# Patient Record
Sex: FEMALE | Race: ASIAN | NOT HISPANIC OR LATINO | Employment: OTHER | URBAN - METROPOLITAN AREA
[De-identification: names, ages, dates, MRNs, and addresses within clinical notes are randomized per-mention and may not be internally consistent; named-entity substitution may affect disease eponyms.]

---

## 2017-02-06 ENCOUNTER — COMMUNICATION - HEALTHEAST (OUTPATIENT)
Dept: PULMONOLOGY | Facility: OTHER | Age: 72
End: 2017-02-06

## 2017-02-15 ENCOUNTER — OFFICE VISIT - HEALTHEAST (OUTPATIENT)
Dept: PULMONOLOGY | Facility: OTHER | Age: 72
End: 2017-02-15

## 2017-02-15 ENCOUNTER — TRANSFERRED RECORDS (OUTPATIENT)
Dept: HEALTH INFORMATION MANAGEMENT | Facility: CLINIC | Age: 72
End: 2017-02-15

## 2017-02-15 DIAGNOSIS — J47.9 BRONCHIECTASIS (H): ICD-10-CM

## 2017-02-15 LAB
IGA SERPL-MCNC: 1990 MG/DL (ref 700–1700)
IGA SERPL-MCNC: 367 MG/DL (ref 65–400)
IGM SERPL-MCNC: 113 MG/DL (ref 60–280)

## 2017-02-16 LAB
ALPHA-1-ANTITRYPSIN, S: 146 MG/DL (ref 100–190)
Lab: 24.3 MG/L
Lab: 8.8 MG/L
SCL-70 AUTOANTIBODIES - HISTORICAL: 2 EU
THERMOACTINOMYCES VULGARIS, IGG AB - HISTORICAL: 16.2 MG/L

## 2017-02-21 LAB
SS-A/RO AUTOANTIBODIES - HISTORICAL: 5 EU
SS-B/LA AUTOANTIBODIES - HISTORICAL: 2 EU

## 2017-03-01 ENCOUNTER — HOSPITAL ENCOUNTER (OUTPATIENT)
Dept: CT IMAGING | Facility: HOSPITAL | Age: 72
Discharge: HOME | End: 2017-03-01
Attending: INTERNAL MEDICINE

## 2017-03-01 ENCOUNTER — HOSPITAL ENCOUNTER (OUTPATIENT)
Dept: RADIOLOGY | Facility: HOSPITAL | Age: 72
Discharge: HOME | End: 2017-03-01
Attending: INTERNAL MEDICINE

## 2017-03-01 DIAGNOSIS — J47.9 BRONCHIECTASIS (H): ICD-10-CM

## 2017-03-13 ENCOUNTER — OFFICE VISIT - HEALTHEAST (OUTPATIENT)
Dept: PULMONOLOGY | Facility: OTHER | Age: 72
End: 2017-03-13

## 2017-03-13 ENCOUNTER — TRANSFERRED RECORDS (OUTPATIENT)
Dept: HEALTH INFORMATION MANAGEMENT | Facility: CLINIC | Age: 72
End: 2017-03-13

## 2017-03-13 DIAGNOSIS — D82.4 HYPERIMMUNOGLOBULIN E (IGE) SYNDROME (H): ICD-10-CM

## 2017-03-16 ENCOUNTER — AMBULATORY - HEALTHEAST (OUTPATIENT)
Dept: LAB | Facility: CLINIC | Age: 72
End: 2017-03-16

## 2017-03-16 DIAGNOSIS — J47.9 BRONCHIECTASIS (H): ICD-10-CM

## 2017-05-17 ENCOUNTER — OFFICE VISIT (OUTPATIENT)
Dept: FAMILY MEDICINE | Facility: CLINIC | Age: 72
End: 2017-05-17

## 2017-05-17 VITALS
SYSTOLIC BLOOD PRESSURE: 127 MMHG | HEART RATE: 98 BPM | TEMPERATURE: 98.1 F | WEIGHT: 101.4 LBS | DIASTOLIC BLOOD PRESSURE: 82 MMHG | OXYGEN SATURATION: 94 % | BODY MASS INDEX: 18.55 KG/M2

## 2017-05-17 DIAGNOSIS — Z23 NEED FOR VACCINATION: ICD-10-CM

## 2017-05-17 DIAGNOSIS — J45.40 MODERATE PERSISTENT ASTHMA WITHOUT COMPLICATION: ICD-10-CM

## 2017-05-17 DIAGNOSIS — J30.2 SEASONAL ALLERGIC RHINITIS, UNSPECIFIED ALLERGIC RHINITIS TRIGGER: ICD-10-CM

## 2017-05-17 DIAGNOSIS — S91.332A PUNCTURE WOUND OF LEFT FOOT, INITIAL ENCOUNTER: Primary | ICD-10-CM

## 2017-05-17 RX ORDER — CETIRIZINE HYDROCHLORIDE 10 MG/1
10 TABLET ORAL EVERY EVENING
Qty: 90 TABLET | Refills: 1 | Status: SHIPPED | OUTPATIENT
Start: 2017-05-17 | End: 2018-08-30

## 2017-05-17 RX ORDER — ACETAMINOPHEN 325 MG/1
650 TABLET ORAL EVERY 4 HOURS PRN
Qty: 100 TABLET | Refills: 0 | Status: SHIPPED | OUTPATIENT
Start: 2017-05-17 | End: 2017-09-18

## 2017-05-17 RX ORDER — FLUTICASONE PROPIONATE 220 UG/1
2 AEROSOL, METERED RESPIRATORY (INHALATION) 2 TIMES DAILY
Qty: 3 INHALER | Refills: 1 | Status: SHIPPED | OUTPATIENT
Start: 2017-05-17 | End: 2017-09-18

## 2017-05-17 NOTE — MR AVS SNAPSHOT
After Visit Summary   2017    Lara Frost    MRN: [id number]           Patient Information     Date Of Birth          1945        Visit Information        Provider Department      2017 1:50 PM Rachna Candelario MD [facility]        Today's Diagnoses     Puncture wound of left foot, initial encounter    -  1    Seasonal allergic rhinitis, unspecified allergic rhinitis trigger        Moderate persistent asthma without complication          Care Instructions    Tetanus shot today  Tylenol and ice for foot  Take zyrtec at night for allergies  Take new inhaler (fluticasone) 2 puffs in the morning and 2 puffs at the evening.     Come back in 1 month to check cough.            Follow-ups after your visit        Who to contact     Please call your clinic at [phone] to:    Ask questions about your health    Make or cancel appointments    Discuss your medicines    Learn about your test results    Speak to your doctor   If you have compliments or concerns about an experience at your clinic, or if you wish to file a complaint, please contact [facility] Patient Relations at [phone] or email us at [email or web address]         Additional Information About Your Visit        MyChart Information     [employer] is an electronic gateway that provides easy, online access to your medical records. With [employer], you can request a clinic appointment, read your test results, renew a prescription or communicate with your care team.     To sign up for [employer] visit the website at [email or web address]   You will be asked to enter the access code listed below, as well as some personal information. Please follow the directions to create your username and password.     Your access code is: [email or web address]  Expires: 8/15/2017  2:57 PM     Your access code will  in 90 days. If you need help or a new code, please contact your [facility]  Clinic or call [phone] for assistance.        Care EveryWhere ID     This is your Care EveryWhere ID. This could be used by other organizations to access your [facility] medical records  [id number]        Your Vitals Were     Pulse Temperature Pulse Oximetry BMI (Body Mass Index)          98 98.1  F (36.7  C) (Oral) 94% 18.55 kg/m2         Blood Pressure from Last 3 Encounters:   05/17/17 127/82   10/25/16 125/68   09/27/16 107/68    Weight from Last 3 Encounters:   05/17/17 101 lb 6.4 oz (46 kg)   10/25/16 103 lb (46.7 kg)   09/27/16 103 lb 12.8 oz (47.1 kg)              Today, you had the following     No orders found for display         Today's Medication Changes          These changes are accurate as of: 5/17/17  3:00 PM.  If you have any questions, ask your nurse or doctor.               Start taking these medicines.        Dose/Directions    acetaminophen 325 MG tablet   Commonly known as:  TYLENOL   Used for:  Puncture wound of left foot, initial encounter   Started by:  Rachna Candelario MD        Dose:  650 mg   Take 2 tablets (650 mg) by mouth every 4 hours as needed for mild pain   Quantity:  100 tablet   Refills:  0       cetirizine 10 MG tablet   Commonly known as:  zyrTEC   Used for:  Seasonal allergic rhinitis, unspecified allergic rhinitis trigger   Started by:  Rachna Candelario MD        Dose:  10 mg   Take 1 tablet (10 mg) by mouth every evening   Quantity:  90 tablet   Refills:  1       fluticasone 220 MCG/ACT Inhaler   Commonly known as:  FLOVENT HFA   Used for:  Moderate persistent asthma without complication   Replaces:  fluticasone 110 MCG/ACT Inhaler   Started by:  Rachna Candelario MD        Dose:  2 puff   Inhale 2 puffs into the lungs 2 times daily   Quantity:  3 Inhaler   Refills:  1         Stop taking these medicines if you haven't already. Please contact your care team if you have questions.     fluticasone 110 MCG/ACT Inhaler   Commonly known as:  FLOVENT HFA   Replaced  by:  fluticasone 220 MCG/ACT Inhaler   Stopped by:  Rachna Candelario MD                Where to get your medicines      These medications were sent to [facility] - [place], [place] - [address], [place] [address]     Phone:  [phone]     acetaminophen 325 MG tablet    cetirizine 10 MG tablet    fluticasone 220 MCG/ACT Inhaler                Primary Care Provider Office Phone # Fax #    Carlos A Bates [phone]       [address] [address]        Thank you!     Thank you for choosing [facility]  for your care. Our goal is always to provide you with excellent care. Hearing back from our patients is one way we can continue to improve our services. Please take a few minutes to complete the written survey that you may receive in the mail after your visit with us. Thank you!             Your Updated Medication List - Protect others around you: Learn how to safely use, store and throw away your medicines at www.disposemymeds.org.          This list is accurate as of: 5/17/17  3:00 PM.  Always use your most recent med list.                   Brand Name Dispense Instructions for use    acetaminophen 325 MG tablet    TYLENOL    100 tablet    Take 2 tablets (650 mg) by mouth every 4 hours as needed for mild pain       * albuterol (2.5 MG/3ML) 0.083% neb solution     360 mL    Take 1 vial (2.5 mg) by nebulization every 6 hours as needed for shortness of breath / dyspnea or wheezing       * albuterol 108 (90 BASE) MCG/ACT Inhaler    PROAIR HFA/PROVENTIL HFA/VENTOLIN HFA    1 Inhaler    Inhale 2 puffs into the lungs every 6 hours as needed for shortness of breath / dyspnea or wheezing       * albuterol (2.5 MG/3ML) 0.083% neb solution     360 mL    Take 1 vial (2.5 mg) by nebulization every 6 hours as needed for shortness of breath / dyspnea or wheezing       * albuterol 108 (90 BASE) MCG/ACT Inhaler    PROAIR HFA/PROVENTIL  HFA/VENTOLIN HFA    3 Inhaler    Inhale 2 puffs into the lungs every 6 hours as needed for shortness of breath / dyspnea or wheezing       alendronate 70 MG tablet    FOSAMAX    12 tablet    Take 1 tablet (70 mg) by mouth every 7 days Take with over 8 ounces water and stay upright for at least 30 minutes after dose.  Take at least 60 minutes before breakfast       calcium carbonate-vitamin D 600-400 MG-UNIT Chew    CALTRATE 600+D    180 tablet    Take 1 chew tab by mouth 2 times daily       cetirizine 10 MG tablet    zyrTEC    90 tablet    Take 1 tablet (10 mg) by mouth every evening       fluticasone 220 MCG/ACT Inhaler    FLOVENT HFA    3 Inhaler    Inhale 2 puffs into the lungs 2 times daily       * order for DME     1 Units    Equipment being ordered: Nebulizer       * order for DME     1 Units    Equipment being ordered: 4 wheeled walker (for back pain and leg weakness 4/5)       RISEdronate 35 MG tablet    ACTONEL    12 tablet    Take 1 tablet (35 mg) by mouth every 7 days Take with over 8 ounces water and stay upright for at least 30 minutes after dose.  Take at least 60 minutes before breakfast       vitamin D 2000 UNITS tablet     90 tablet    Take 2,000 Units by mouth daily       * Notice:  This list has 6 medication(s) that are the same as other medications prescribed for you. Read the directions carefully, and ask your doctor or other care provider to review them with you.

## 2017-05-17 NOTE — NURSING NOTE
name: Stan Velez  Language: Portuguese  Agency: [facility]  Phone number: [phone]

## 2017-05-17 NOTE — PROGRESS NOTES
"     SUBJECTIVE     Chief Complaint   Patient presents with     other     stepped on the pin yesterday afternoon and come here today for shot or something per pt.      other     need to renew the handicap parking permit per pt.      Cough     have on and off coughing for a while now and need coughing medication per pt.        Subjective: Lara Frost is a 72 year old [place] Verdean speaking female with osteoporosis here for 3 concerns.    L foot pain: Stepped on tamanna push pin ysterday afternoon. Hasn't taken anything for pain. Hurts worse today. Came in today because she wasn't sure if she needed a shot. No fevers. Bled initially, now stopped. Has been washing it with soap and water. No concern for broken pin or foreign object in wound.     Cough - on and off for months. Albuterol inhaler helps. Has been taking it 3-4x/day. She also takes fluticasone BID. Has been seen by pulmonology in 03/2017, thought to have bronchiectasis with +allergies, would like to refer to allergy for further testing. Notices cough more at night, can't sleep. Also notes a 10 lb weight loss over past 6 months. No fevers or night sweats.. No shortness of breath. Also notes coughing gets worse this time of year.    Needs renewal of handicap application. Does not drive herself. States this is due to \"can't walk far\".     ROS:  General: As above  Resp: As above  MSK: As above      PMH/PSH, FamHx, Meds, Allergies reviewed and updated as needed.    Social History     Social History     Marital status:      Spouse name: N/A     Number of children: N/A     Years of education: N/A     Social History Main Topics     Smoking status: Never Smoker     Smokeless tobacco: Never Used     Alcohol use No     Drug use: No     Sexual activity: Not Asked     Other Topics Concern     None     Social History Narrative           OBJECTIVE     /82  Pulse 98  Temp 98.1  F (36.7  C) (Oral)  Wt 101 lb 6.4 oz (46 kg)  SpO2 94%  BMI 18.55 kg/m2  Body mass " index is 18.55 kg/(m^2).    Physical exam:  Gen: No acute distress, pleasant and conversant.   CV: Regular rate and rhythm, no murmurs/rubs/gallops  Resp: Clear to auscultation bilaterally, inspiratory wheezes with deep breaths, expiratory wheezes throughout all lung fields.   MSK: 4/5 strength to arm flexion/extension, 4/5 hip flexion bilaterally, has to use her hands to flex hips while sitting, gait is unstable and has to hold on to examiner to remain stable. L foot has 1 small petechiae, no surrounding erythema, somewhat tender to palpation, no fluctuance.   Psych: Mood and affect appropriate, mentation appears normal.    No results found for this or any previous visit (from the past 24 hour(s)).      ASSESSMENT AND PLAN     Lara Frost is a 72 year old female here for multiple concerns.    1. Puncture wound of left foot, initial encounter  No signs of infection or foreign body or continued bleeding. Advised tylenol, ice and rest as appropriate.  - acetaminophen (TYLENOL) 325 MG tablet; Take 2 tablets (650 mg) by mouth every 4 hours as needed for mild pain  Dispense: 100 tablet; Refill: 0    2. Seasonal allergic rhinitis, unspecified allergic rhinitis trigger  Cough may be due to post-nasal drip, sounds like worsened by environmental allergies. Will try cetirizine and see if this helps.  - cetirizine (ZYRTEC) 10 MG tablet; Take 1 tablet (10 mg) by mouth every evening  Dispense: 90 tablet; Refill: 1    3. Moderate persistent asthma without complication  Reviewed pulmonology notes, bronchiectasis on past imaging with negative bronchiectasis workup, unclear PFTs,, and +hypersensitivity testing. Hx seems consistent with seasonal allergy triggered asthma. Inspiratory and expiratory wheezes on exam. Will increase ICS to medium dose flovent BID and see if this helps. Would like to have pharmacy see her and evaluate inhaler technique and for possible spacer or mask. Also unable to see records for sputum and repeat quant  from last pulmonology appointment, and  had to leave before care everywhere paperwork was filled out - will need to evaluate this in future. If no relief, would refer to allergy per pulm recs.   - fluticasone (FLOVENT HFA) 220 MCG/ACT Inhaler; Inhale 2 puffs into the lungs 2 times daily  Dispense: 3 Inhaler; Refill: 1      4. Handicap application  Generalized weakness and gait instability on exam. Filled this out for patient and copied for chart.         RTC in 1 months for follow up of cough or sooner if develops new or worsening symptoms.      Rachna Candelario MD, PGY-1  I precepted today with Lorrie Bennett MD.

## 2017-05-17 NOTE — PATIENT INSTRUCTIONS
Tetanus shot today  Tylenol and ice for foot  Take zyrtec at night for allergies  Take new inhaler (fluticasone) 2 puffs in the morning and 2 puffs at the evening.     Come back in 1 month to check cough.

## 2017-05-17 NOTE — PROGRESS NOTES
Preceptor attestation:  Patient seen and discussed with the resident. Assessment and plan reviewed with resident and agreed upon.  Supervising physician: Lorrie Bennett  [facility]

## 2017-06-07 ENCOUNTER — AMBULATORY - HEALTHEAST (OUTPATIENT)
Dept: PULMONOLOGY | Facility: OTHER | Age: 72
End: 2017-06-07

## 2017-06-07 DIAGNOSIS — R76.8 ELEVATED IGE LEVEL: ICD-10-CM

## 2017-07-03 DIAGNOSIS — M81.0 AGE-RELATED OSTEOPOROSIS WITHOUT CURRENT PATHOLOGICAL FRACTURE: ICD-10-CM

## 2017-07-25 ENCOUNTER — COMMUNICATION - HEALTHEAST (OUTPATIENT)
Dept: PULMONOLOGY | Facility: OTHER | Age: 72
End: 2017-07-25

## 2017-08-05 ENCOUNTER — TRANSFERRED RECORDS (OUTPATIENT)
Dept: HEALTH INFORMATION MANAGEMENT | Facility: CLINIC | Age: 72
End: 2017-08-05

## 2017-08-07 ENCOUNTER — OFFICE VISIT (OUTPATIENT)
Dept: FAMILY MEDICINE | Facility: CLINIC | Age: 72
End: 2017-08-07

## 2017-08-07 VITALS
HEART RATE: 86 BPM | WEIGHT: 100.8 LBS | SYSTOLIC BLOOD PRESSURE: 118 MMHG | TEMPERATURE: 98.4 F | DIASTOLIC BLOOD PRESSURE: 72 MMHG | BODY MASS INDEX: 18.44 KG/M2

## 2017-08-07 DIAGNOSIS — M25.561 PAIN IN BOTH KNEES, UNSPECIFIED CHRONICITY: Primary | ICD-10-CM

## 2017-08-07 DIAGNOSIS — J44.9 CHRONIC OBSTRUCTIVE PULMONARY DISEASE, UNSPECIFIED COPD TYPE (H): ICD-10-CM

## 2017-08-07 DIAGNOSIS — M25.562 PAIN IN BOTH KNEES, UNSPECIFIED CHRONICITY: Primary | ICD-10-CM

## 2017-08-07 DIAGNOSIS — K59.01 SLOW TRANSIT CONSTIPATION: ICD-10-CM

## 2017-08-07 RX ORDER — CHOLECALCIFEROL (VITAMIN D3) 50 MCG
2000 TABLET ORAL DAILY
Qty: 100 TABLET | Refills: 3 | Status: SHIPPED | OUTPATIENT
Start: 2017-08-07 | End: 2022-10-06

## 2017-08-07 RX ORDER — ACETAMINOPHEN 325 MG/1
TABLET ORAL
Qty: 100 TABLET | Refills: 0 | Status: SHIPPED | OUTPATIENT
Start: 2017-08-07 | End: 2019-01-07

## 2017-08-07 RX ORDER — FLUTICASONE PROPIONATE 110 UG/1
2 AEROSOL, METERED RESPIRATORY (INHALATION) 2 TIMES DAILY
Qty: 1 INHALER | Refills: 3 | Status: SHIPPED | OUTPATIENT
Start: 2017-08-07 | End: 2017-11-24

## 2017-08-07 RX ORDER — ALBUTEROL SULFATE 90 UG/1
2 AEROSOL, METERED RESPIRATORY (INHALATION) EVERY 6 HOURS PRN
Qty: 1 INHALER | Refills: 3 | Status: SHIPPED | OUTPATIENT
Start: 2017-08-07 | End: 2018-01-30

## 2017-08-07 NOTE — MR AVS SNAPSHOT
After Visit Summary   2017    Lara Frost    MRN: [id number]           Patient Information     Date Of Birth          1945        Visit Information        Provider Department      2017 2:30 PM Carlos A Bates MD [facility]        Today's Diagnoses     Pain in both knees, unspecified chronicity    -  1    Slow transit constipation        Chronic obstructive pulmonary disease, unspecified COPD type (H)          Care Instructions    Fu 1 month            Follow-ups after your visit        Who to contact     Please call your clinic at [phone] to:    Ask questions about your health    Make or cancel appointments    Discuss your medicines    Learn about your test results    Speak to your doctor   If you have compliments or concerns about an experience at your clinic, or if you wish to file a complaint, please contact [facility] Patient Relations at [phone] or email us at [email or web address]         Additional Information About Your Visit        MyChart Information     Lightboxt is an electronic gateway that provides easy, online access to your medical records. With Privatext, you can request a clinic appointment, read your test results, renew a prescription or communicate with your care team.     To sign up for Lightboxt visit the website at [email or web address]   You will be asked to enter the access code listed below, as well as some personal information. Please follow the directions to create your username and password.     Your access code is: [email or web address]  Expires: 8/15/2017  2:57 PM     Your access code will  in 90 days. If you need help or a new code, please contact your [facility] or call [phone] for assistance.        Care EveryWhere ID     This is your Care EveryWhere ID. This could be used by other organizations to access your [facility] medical records  [id number]        Your Vitals Were      Pulse Temperature BMI (Body Mass Index)             86 98.4  F (36.9  C) (Oral) 18.44 kg/m2          Blood Pressure from Last 3 Encounters:   08/07/17 118/72   05/17/17 127/82   10/25/16 125/68    Weight from Last 3 Encounters:   08/07/17 100 lb 12.8 oz (45.7 kg)   05/17/17 101 lb 6.4 oz (46 kg)   10/25/16 103 lb (46.7 kg)              Today, you had the following     No orders found for display         Today's Medication Changes          These changes are accurate as of: 8/7/17  3:04 PM.  If you have any questions, ask your nurse or doctor.               Start taking these medicines.        Dose/Directions    psyllium 0.52 G capsule   Used for:  Slow transit constipation   Started by:  Carlos A Bates MD        Dose:  1 capsule   Take 1 capsule (0.52 g) by mouth 2 times daily   Quantity:  60 capsule   Refills:  0         These medicines have changed or have updated prescriptions.        Dose/Directions    * acetaminophen 325 MG tablet   Commonly known as:  TYLENOL   This may have changed:  Another medication with the same name was added. Make sure you understand how and when to take each.   Used for:  Puncture wound of left foot, initial encounter   Changed by:  Rachna Candelario MD        Dose:  650 mg   Take 2 tablets (650 mg) by mouth every 4 hours as needed for mild pain   Quantity:  100 tablet   Refills:  0       * acetaminophen 325 MG tablet   Commonly known as:  TYLENOL   This may have changed:  You were already taking a medication with the same name, and this prescription was added. Make sure you understand how and when to take each.   Used for:  Pain in both knees, unspecified chronicity   Changed by:  Carlos A Bates MD        One to two as needed for knee pain   Quantity:  100 tablet   Refills:  0       * albuterol (2.5 MG/3ML) 0.083% neb solution   This may have changed:  Another medication with the same name was added. Make sure you understand how and when to take each.   Used for:  COPD (chronic  obstructive pulmonary disease) (H)   Changed by:  Chet Spring DO        Dose:  1 vial   Take 1 vial (2.5 mg) by nebulization every 6 hours as needed for shortness of breath / dyspnea or wheezing   Quantity:  360 mL   Refills:  6       * albuterol 108 (90 BASE) MCG/ACT Inhaler   Commonly known as:  PROAIR HFA/PROVENTIL HFA/VENTOLIN HFA   This may have changed:  Another medication with the same name was added. Make sure you understand how and when to take each.   Used for:  Seasonal allergic rhinitis   Changed by:  Chet Spring DO        Dose:  2 puff   Inhale 2 puffs into the lungs every 6 hours as needed for shortness of breath / dyspnea or wheezing   Quantity:  1 Inhaler   Refills:  6       * albuterol (2.5 MG/3ML) 0.083% neb solution   This may have changed:  Another medication with the same name was added. Make sure you understand how and when to take each.   Used for:  Chronic restrictive lung disease   Changed by:  Carlos A Bates MD        Dose:  1 vial   Take 1 vial (2.5 mg) by nebulization every 6 hours as needed for shortness of breath / dyspnea or wheezing   Quantity:  360 mL   Refills:  3       * albuterol 108 (90 BASE) MCG/ACT Inhaler   Commonly known as:  PROAIR HFA/PROVENTIL HFA/VENTOLIN HFA   This may have changed:  Another medication with the same name was added. Make sure you understand how and when to take each.   Used for:  Moderate persistent asthma without complication   Changed by:  Carlos A Bates MD        Dose:  2 puff   Inhale 2 puffs into the lungs every 6 hours as needed for shortness of breath / dyspnea or wheezing   Quantity:  3 Inhaler   Refills:  1       * albuterol 108 (90 BASE) MCG/ACT Inhaler   Commonly known as:  PROAIR HFA/PROVENTIL HFA/VENTOLIN HFA   This may have changed:  You were already taking a medication with the same name, and this prescription was added. Make sure you understand how and when to take each.   Used for:  Chronic obstructive pulmonary disease,  unspecified COPD type (H)   Changed by:  Carlos A Bates MD        Dose:  2 puff   Inhale 2 puffs into the lungs every 6 hours as needed for shortness of breath / dyspnea or wheezing   Quantity:  1 Inhaler   Refills:  3       * fluticasone 220 MCG/ACT Inhaler   Commonly known as:  FLOVENT HFA   This may have changed:  Another medication with the same name was added. Make sure you understand how and when to take each.   Used for:  Moderate persistent asthma without complication   Changed by:  Rachna Candelario MD        Dose:  2 puff   Inhale 2 puffs into the lungs 2 times daily   Quantity:  3 Inhaler   Refills:  1       * fluticasone 110 MCG/ACT Inhaler   Commonly known as:  FLOVENT HFA   This may have changed:  You were already taking a medication with the same name, and this prescription was added. Make sure you understand how and when to take each.   Used for:  Chronic obstructive pulmonary disease, unspecified COPD type (H)   Changed by:  Carlos A Bates MD        Dose:  2 puff   Inhale 2 puffs into the lungs 2 times daily   Quantity:  1 Inhaler   Refills:  3       * Notice:  This list has 9 medication(s) that are the same as other medications prescribed for you. Read the directions carefully, and ask your doctor or other care provider to review them with you.         Where to get your medicines      These medications were sent to [facility] - [place], [place] - [address], [place] [address]     Phone:  [phone]     acetaminophen 325 MG tablet    albuterol 108 (90 BASE) MCG/ACT Inhaler    fluticasone 110 MCG/ACT Inhaler    psyllium 0.52 G capsule                Primary Care Provider Office Phone # Fax #    Carlos A Bates [phone]       [address] [address]        Equal Access to Services     ENRIKE CARPENTER AH: Joanie Parham, miguel mejia, nils chavez, remy goyal  labradly sheets. So [facility] [phone].    ATENCIÓN: Si habla gabriela, tiene a floerz disposición servicios gratuitos de asistencia lingüística. Salomón al [phone].    We comply with applicable federal civil rights laws and [place] laws. We do not discriminate on the basis of race, color, national origin, age, disability sex, sexual orientation or gender identity.            Thank you!     Thank you for choosing [facility]  for your care. Our goal is always to provide you with excellent care. Hearing back from our patients is one way we can continue to improve our services. Please take a few minutes to complete the written survey that you may receive in the mail after your visit with us. Thank you!             Your Updated Medication List - Protect others around you: Learn how to safely use, store and throw away your medicines at www.disposemymeds.org.          This list is accurate as of: 8/7/17  3:04 PM.  Always use your most recent med list.                   Brand Name Dispense Instructions for use Diagnosis    * acetaminophen 325 MG tablet    TYLENOL    100 tablet    Take 2 tablets (650 mg) by mouth every 4 hours as needed for mild pain    Puncture wound of left foot, initial encounter       * acetaminophen 325 MG tablet    TYLENOL    100 tablet    One to two as needed for knee pain    Pain in both knees, unspecified chronicity       * albuterol (2.5 MG/3ML) 0.083% neb solution     360 mL    Take 1 vial (2.5 mg) by nebulization every 6 hours as needed for shortness of breath / dyspnea or wheezing    COPD (chronic obstructive pulmonary disease) (H)       * albuterol 108 (90 BASE) MCG/ACT Inhaler    PROAIR HFA/PROVENTIL HFA/VENTOLIN HFA    1 Inhaler    Inhale 2 puffs into the lungs every 6 hours as needed for shortness of breath / dyspnea or wheezing    Seasonal allergic rhinitis       * albuterol (2.5 MG/3ML) 0.083% neb solution     360 mL    Take 1 vial (2.5 mg) by nebulization every 6 hours as needed for  shortness of breath / dyspnea or wheezing    Chronic restrictive lung disease       * albuterol 108 (90 BASE) MCG/ACT Inhaler    PROAIR HFA/PROVENTIL HFA/VENTOLIN HFA    3 Inhaler    Inhale 2 puffs into the lungs every 6 hours as needed for shortness of breath / dyspnea or wheezing    Moderate persistent asthma without complication       * albuterol 108 (90 BASE) MCG/ACT Inhaler    PROAIR HFA/PROVENTIL HFA/VENTOLIN HFA    1 Inhaler    Inhale 2 puffs into the lungs every 6 hours as needed for shortness of breath / dyspnea or wheezing    Chronic obstructive pulmonary disease, unspecified COPD type (H)       alendronate 70 MG tablet    FOSAMAX    12 tablet    Take 1 tablet (70 mg) by mouth every 7 days Take with over 8 ounces water and stay upright for at least 30 minutes after dose.  Take at least 60 minutes before breakfast    Age-related osteoporosis without current pathological fracture       calcium carbonate-vitamin D 600-400 MG-UNIT Chew    CALTRATE 600+D    180 tablet    Take 1 chew tab by mouth 2 times daily    Age-related osteoporosis without current pathological fracture       cetirizine 10 MG tablet    zyrTEC    90 tablet    Take 1 tablet (10 mg) by mouth every evening    Seasonal allergic rhinitis, unspecified allergic rhinitis trigger       * fluticasone 220 MCG/ACT Inhaler    FLOVENT HFA    3 Inhaler    Inhale 2 puffs into the lungs 2 times daily    Moderate persistent asthma without complication       * fluticasone 110 MCG/ACT Inhaler    FLOVENT HFA    1 Inhaler    Inhale 2 puffs into the lungs 2 times daily    Chronic obstructive pulmonary disease, unspecified COPD type (H)       * order for DME     1 Units    Equipment being ordered: Nebulizer    COPD (chronic obstructive pulmonary disease) (H)       * order for DME     1 Units    Equipment being ordered: 4 wheeled walker (for back pain and leg weakness 4/5)    LBP (low back pain)       psyllium 0.52 G capsule     60 capsule    Take 1 capsule (0.52 g)  by mouth 2 times daily    Slow transit constipation       RISEdronate 35 MG tablet    ACTONEL    12 tablet    Take 1 tablet (35 mg) by mouth every 7 days Take with over 8 ounces water and stay upright for at least 30 minutes after dose.  Take at least 60 minutes before breakfast    Osteoporosis       vitamin D 2000 UNITS tablet     90 tablet    Take 2,000 Units by mouth daily    Preventative health care       * Notice:  This list has 11 medication(s) that are the same as other medications prescribed for you. Read the directions carefully, and ask your doctor or other care provider to review them with you.

## 2017-08-07 NOTE — PROGRESS NOTES
S: Lara Frost is a 72 year old female who returns for follow up of  ED/stomach pain, bee sting    WORK UP Negative, CBC/BMP/EKG, CT    PMHX/PSHX/MEDS/ALLERGIES/SHX/FHX reviewed and updated in Epic.      ROS:  General: No fevers, chills  Head: No headache  Ears: No acute change in hearing.    CV: No chest pain or palpitations.  Resp: No shortness of breath.  No cough. No hemoptysis.  GI: No nausea, vomiting, constipation, diarrhea  : No urinary pains    O: /72 (BP Location: Left arm, Patient Position: Chair)  Pulse 86  Temp 98.4  F (36.9  C) (Oral)  Wt 100 lb 12.8 oz (45.7 kg)  BMI 18.44 kg/m2   Gen:  Well nourished and in NAD    CV:  RRR  - no murmurs, rubs, or gallups,   Pulm:  CTAB, no wheezes/rales/rhonchi, good air entry   ABD: soft, nontender, no masses, no rebound, BS intact throughout  Extrem: no cyanosis, edema or clubbing  Psych: Euthymic      Abdominal pain/better  Bee sting?stable   Constipation   DJD: knees/tylenol      RTC [id number], for follow up of  constipation or sooner if develops new or worsening symptoms.    Carlos A Bates

## 2017-08-25 ENCOUNTER — MEDICAL CORRESPONDENCE (OUTPATIENT)
Dept: HEALTH INFORMATION MANAGEMENT | Facility: CLINIC | Age: 72
End: 2017-08-25

## 2017-09-12 ENCOUNTER — COMMUNICATION - HEALTHEAST (OUTPATIENT)
Dept: PULMONOLOGY | Facility: OTHER | Age: 72
End: 2017-09-12

## 2017-09-18 ENCOUNTER — OFFICE VISIT (OUTPATIENT)
Dept: FAMILY MEDICINE | Facility: CLINIC | Age: 72
End: 2017-09-18

## 2017-09-18 VITALS
HEART RATE: 82 BPM | SYSTOLIC BLOOD PRESSURE: 143 MMHG | DIASTOLIC BLOOD PRESSURE: 65 MMHG | TEMPERATURE: 98.2 F | WEIGHT: 97 LBS | OXYGEN SATURATION: 96 % | BODY MASS INDEX: 17.85 KG/M2 | HEIGHT: 62 IN

## 2017-09-18 DIAGNOSIS — M80.00XA AGE-RELATED OSTEOPOROSIS WITH CURRENT PATHOLOGICAL FRACTURE, INITIAL ENCOUNTER: Primary | ICD-10-CM

## 2017-09-18 DIAGNOSIS — M81.8 OTHER OSTEOPOROSIS WITHOUT CURRENT PATHOLOGICAL FRACTURE: ICD-10-CM

## 2017-09-18 RX ORDER — ALENDRONATE SODIUM 70 MG/1
70 TABLET ORAL
Qty: 12 TABLET | Refills: 3 | Status: SHIPPED | OUTPATIENT
Start: 2017-09-18 | End: 2018-09-07

## 2017-09-18 RX ORDER — ALENDRONATE SODIUM 35 MG/1
35 TABLET ORAL
Qty: 4 TABLET | Refills: 3 | Status: SHIPPED | OUTPATIENT
Start: 2017-09-18 | End: 2017-09-18

## 2017-09-18 NOTE — PROGRESS NOTES
"S: Lara Frost is a 72 year old female who returns for follow up of  Knee pain    Patients states that main concern today is bilateral knee pain, right more so than left. When patient is walking, she has some pain. It is only occasionally that she feels pain and sometimes she can't control her knee. The patient also mentions feelings of weakness sometimes when standing up. She hasn't had any recent falls or issues with bumping into things.  She is able to stand on her own without help. The patient feels unstable when standing. The patient also has the diagnosis of osteoporosis which she had previously taken risedronate. Her most recent T score was -2.5. She was having issues swallowing the medication before, and so she had stopped it.     The patient is also concerned about recent weight loss she has had. Lara Frost would like to be prescribed Ensure to help with nutritional status.    PMHX/PSHX/MEDS/ALLERGIES/SHX/FHX reviewed and updated in Epic.    ROS:  General: No fevers, chills  Head: No headache  Ears: No acute change in hearing.    CV: No chest pain or palpitations.  Resp: No shortness of breath.  No cough. No hemoptysis.  GI: No nausea, vomiting, constipation, diarrhea  : No urinary pains    O: /65  Pulse 82  Temp 98.2  F (36.8  C) (Oral)  Ht 5' 1.75\" (156.8 cm)  Wt 97 lb (44 kg)  SpO2 96%  BMI 17.89 kg/m2   Gen: In NAD  CV:  RRR  - no murmurs, rubs, or gallups,   Pulm:  CTAB, no wheezes/rales/rhonchi, good air entry   Extrem: no cyanosis, edema or clubbing  Psych: Euthymic   Musculoskeletal: She does have pain on palpation around her lateral lower patella. She has some tenderness on palpation involving her medial joint line around her knee capsule.     Assessment/Plan:  1. Knee pain, right more than left, likely due to DJD. Plan: Discussed with patient that her knee pain is likely due to DJD and that it is caused by joint wear and tear Given her physical exam findings, it is not suggestive of a " gout etiology. Discussed with patient that we could consider steroidal joint injections to help with her pain, which she will consider, but will talk more about this when she returns for follow up in two weeks. Recommended patient try Tylenol for 650 mg BID (2 tablets in morning, 2 in evening, 4 tablets total) for pain relief. Talked with patient that moving around more will help. Would like patient to follow up in two weeks to see how her pain is being controlled, and if not well controlled, will consider injection as above. If she truly has issues with mobility, not being able to walk distances longer than two blocks without pain, may need to consider knee replacement/  2. Osteoporosis, previously on risendronate, with recent bone density of hip T score -2.5.   Plan: Will prescribe risendronate again. Discussed with patient that when she takes this medication she should do so sitting up with a glass of water to help with her swallowing.       Note was scribed by Jesus Harmon MS3, on behalf of Lois Bates MD.    Carlos A Bates    The medical student acted as a scribe and the encounter documented above was performed completely by me and the documentation accurately reflects the work I have performed today.  Carlo sA Bates

## 2017-09-18 NOTE — PATIENT INSTRUCTIONS
- Two 325 mg Tylenol twice per day for her right knee pain. Do not take any other medications for pain    - Will prescribe patient risedronate, which you were taking previously for your bone strength, which you should take sitting up with water daily.    - Will also order ensure for you.

## 2017-09-18 NOTE — MR AVS SNAPSHOT
After Visit Summary   2017    Lara Frost    MRN: [id number]           Patient Information     Date Of Birth          1945        Visit Information        Provider Department      2017 1:30 PM Carlos A Bates MD [facility]        Care Instructions    - Two 325 mg Tylenol twice per day for her right knee pain. Do not take any other medications for pain    - Will prescribe patient risedronate, which you were taking previously for your bone strength, which you should take sitting up with water daily.    - Will also order ensure for you.            Follow-ups after your visit        Who to contact     Please call your clinic at [phone] to:    Ask questions about your health    Make or cancel appointments    Discuss your medicines    Learn about your test results    Speak to your doctor   If you have compliments or concerns about an experience at your clinic, or if you wish to file a complaint, please contact [facility] Patient Relations at [phone] or email us at [email or web address]         Additional Information About Your Visit        Snjohus Softwarehart Information     Video Blocks is an electronic gateway that provides easy, online access to your medical records. With Video Blocks, you can request a clinic appointment, read your test results, renew a prescription or communicate with your care team.     To sign up for Video Blocks visit the website at [email or web address]   You will be asked to enter the access code listed below, as well as some personal information. Please follow the directions to create your username and password.     Your access code is: [email or web address]  Expires: 2017  2:25 PM     Your access code will  in 90 days. If you need help or a new code, please contact your [facility] or call [phone] for assistance.        Care EveryWhere ID     This is your Care EveryWhere ID. This could be used by  "other organizations to access your [place] medical records  [id number]        Your Vitals Were     Pulse Temperature Height Pulse Oximetry BMI (Body Mass Index)       82 98.2  F (36.8  C) (Oral) 5' 1.75\" (156.8 cm) 96% 17.89 kg/m2        Blood Pressure from Last 3 Encounters:   09/18/17 143/65   08/07/17 118/72   05/17/17 127/82    Weight from Last 3 Encounters:   09/18/17 97 lb (44 kg)   08/07/17 100 lb 12.8 oz (45.7 kg)   05/17/17 101 lb 6.4 oz (46 kg)              Today, you had the following     No orders found for display       Primary Care Provider Office Phone # Fax #    Carlos A Bates [phone]       [address] [place] [address]        Equal Access to Services     [facility]RE [facility] : Hadii cristo Parham, waaxblayne mejia, qaybta kaalmablayne chavez, remy dickson . So [facility] [phone].    ATENCIÓN: Si habla español, tiene a florez disposición servicios gratuitos de asistencia lingüística. Salomón al [phone].    We comply with applicable federal civil rights laws and [place] laws. We do not discriminate on the basis of race, color, national origin, age, disability sex, sexual orientation or gender identity.            Thank you!     Thank you for choosing [facility]  for your care. Our goal is always to provide you with excellent care. Hearing back from our patients is one way we can continue to improve our services. Please take a few minutes to complete the written survey that you may receive in the mail after your visit with us. Thank you!             Your Updated Medication List - Protect others around you: Learn how to safely use, store and throw away your medicines at www.disposemymeds.org.          This list is accurate as of: 9/18/17  2:25 PM.  Always use your most recent med list.                   Brand Name Dispense Instructions for use Diagnosis    acetaminophen 325 MG tablet    TYLENOL    100 tablet    One to two as needed for knee pain "    Pain in both knees, unspecified chronicity       * albuterol (2.5 MG/3ML) 0.083% neb solution     360 mL    Take 1 vial (2.5 mg) by nebulization every 6 hours as needed for shortness of breath / dyspnea or wheezing    Chronic restrictive lung disease       * albuterol 108 (90 BASE) MCG/ACT Inhaler    PROAIR HFA/PROVENTIL HFA/VENTOLIN HFA    1 Inhaler    Inhale 2 puffs into the lungs every 6 hours as needed for shortness of breath / dyspnea or wheezing    Chronic obstructive pulmonary disease, unspecified COPD type (H)       alendronate 70 MG tablet    FOSAMAX    12 tablet    Take 1 tablet (70 mg) by mouth every 7 days Take with over 8 ounces water and stay upright for at least 30 minutes after dose.  Take at least 60 minutes before breakfast    Age-related osteoporosis without current pathological fracture       calcium carbonate-vitamin D 600-400 MG-UNIT Chew    CALTRATE 600+D    180 tablet    Take 1 chew tab by mouth 2 times daily    Age-related osteoporosis without current pathological fracture       cetirizine 10 MG tablet    zyrTEC    90 tablet    Take 1 tablet (10 mg) by mouth every evening    Seasonal allergic rhinitis, unspecified allergic rhinitis trigger       fluticasone 110 MCG/ACT Inhaler    FLOVENT HFA    1 Inhaler    Inhale 2 puffs into the lungs 2 times daily    Chronic obstructive pulmonary disease, unspecified COPD type (H)       psyllium 0.52 G capsule     60 capsule    Take 1 capsule (0.52 g) by mouth 2 times daily    Slow transit constipation       RISEdronate 35 MG tablet    ACTONEL    12 tablet    Take 1 tablet (35 mg) by mouth every 7 days Take with over 8 ounces water and stay upright for at least 30 minutes after dose.  Take at least 60 minutes before breakfast    Osteoporosis       vitamin D 2000 UNITS tablet     100 tablet    Take 2,000 Units by mouth daily    Pain in both knees, unspecified chronicity       * Notice:  This list has 2 medication(s) that are the same as other  medications prescribed for you. Read the directions carefully, and ask your doctor or other care provider to review them with you.

## 2017-09-18 NOTE — PROGRESS NOTES
Medication Management Note                                                       Lara was referred by Dr. Bates for pharmacy services for medication management     MEDICATION REVIEW:  Discussed all medication indications, dosage and effectiveness, adverse effects, and adherence with patient/caregiver.    Pt had meds with them: no  Pt had med list with them: no  Pt was knowledgeable about meds: no  Medications set up by: Daughter  Medications administered by someone else (e.g., LTCF): Yes: daughter  Pt uses a medication box or automated dispenser: no  Called pharmacy to obtain or clarify med list:  no  Called [facility]N or LTC[facility] to obtain or clarify med list:  no    Medication Discrepancies  Medications on EMR med list that pt is NOT taking:  Risendronate, Alendronate  Medications pt IS taking that are NOT on EMR med list (e.g., from specialist, hospital): none  OTC meds/ dietary supplements pt taking on own that are NOT on EMR med list:  none  Dosage listed differently than how patient is taking: none   Frequency listed differently than how patient is taking:yes, She is taking the albuterol PRN every 4 hours, and is using this almost every 4 hours  Duplicate medication on list (two occurrences of the same medication):  yes, 6  TOTAL NUMBER OF MEDICATION DISCREPANCIES:  9    Subjective                                                       Patient reports the following problems or concerns with their medications:  none  Patient reports the following adverse reactions to medications:  States that she thought that Dr. Bates told her that her osteoporosis med was causing stomach pain. She also state that her refills ran out, so has not been on this for some months.  Pt reports missing doses:  never  Additional subjective information (e.g., reason for visit, frequency of PRNs, reasons meds were D/C ed):    She wants to discuss knee pain    She is taking her albuterol every 4 hours. She knows that this is a PRN med, but states  that she needs it all of the time.     Objective                                                       Patient Active Problem List   Diagnosis     Sebaceous cyst     Edema of larynx     Eczema     Tear film insufficiency     Disease of lung     Chronic rhinitis     Restrictive lung disease     Osteoporosis without pathological fracture       Current Outpatient Prescriptions   Medication Sig Dispense Refill     acetaminophen (TYLENOL) 325 MG tablet One to two as needed for knee pain 100 tablet 0     psyllium 0.52 G capsule Take 1 capsule (0.52 g) by mouth 2 times daily 60 capsule 0     fluticasone (FLOVENT HFA) 110 MCG/ACT Inhaler Inhale 2 puffs into the lungs 2 times daily 1 Inhaler 3     albuterol (PROAIR HFA/PROVENTIL HFA/VENTOLIN HFA) 108 (90 BASE) MCG/ACT Inhaler Inhale 2 puffs into the lungs every 6 hours as needed for shortness of breath / dyspnea or wheezing 1 Inhaler 3     Cholecalciferol (VITAMIN D) 2000 UNITS tablet Take 2,000 Units by mouth daily 100 tablet 3     calcium carbonate-vitamin D (CALTRATE 600+D) 600-400 MG-UNIT CHEW Take 1 chew tab by mouth 2 times daily 180 tablet 3     cetirizine (ZYRTEC) 10 MG tablet Take 1 tablet (10 mg) by mouth every evening 90 tablet 1     [DISCONTINUED] fluticasone (FLOVENT HFA) 220 MCG/ACT Inhaler Inhale 2 puffs into the lungs 2 times daily 3 Inhaler 1     alendronate (FOSAMAX) 70 MG tablet Take 1 tablet (70 mg) by mouth every 7 days Take with over 8 ounces water and stay upright for at least 30 minutes after dose.  Take at least 60 minutes before breakfast 12 tablet 1     [DISCONTINUED] albuterol (PROAIR HFA, PROVENTIL HFA, VENTOLIN HFA) 108 (90 BASE) MCG/ACT inhaler Inhale 2 puffs into the lungs every 6 hours as needed for shortness of breath / dyspnea or wheezing 3 Inhaler 1     RISEdronate (ACTONEL) 35 MG tablet Take 1 tablet (35 mg) by mouth every 7 days Take with over 8 ounces water and stay upright for at least 30 minutes after dose.  Take at least 60 minutes  before breakfast 12 tablet 3     albuterol (2.5 MG/3ML) 0.083% nebulizer solution Take 1 vial (2.5 mg) by nebulization every 6 hours as needed for shortness of breath / dyspnea or wheezing 360 mL 3     [DISCONTINUED] albuterol (PROAIR HFA, PROVENTIL HFA, VENTOLIN HFA) 108 (90 BASE) MCG/ACT inhaler Inhale 2 puffs into the lungs every 6 hours as needed for shortness of breath / dyspnea or wheezing 1 Inhaler 6     [DISCONTINUED] albuterol (2.5 MG/3ML) 0.083% nebulizer solution Take 1 vial (2.5 mg) by nebulization every 6 hours as needed for shortness of breath / dyspnea or wheezing 360 mL 6       Social History   Substance Use Topics     Smoking status: Never Smoker     Smokeless tobacco: Never Used     Alcohol use No       CrCl cannot be calculated (Patient's most recent sCr result is older than the maximum 90 days allowed.).    No results found for: A1C  Last Basic Metabolic Panel:  Lab Results   Component Value Date     12/29/2014      Lab Results   Component Value Date    POTASSIUM 4.0 12/29/2014     Lab Results   Component Value Date    CHLORIDE 102 12/29/2014     Lab Results   Component Value Date    CHECO 9.3 12/29/2014     No results found for: CO2  Lab Results   Component Value Date    BUN 11 12/29/2014     Lab Results   Component Value Date    CR 0.62 12/29/2014     Lab Results   Component Value Date    GLC 85 12/29/2014       BP Readings from Last 3 Encounters:   09/18/17 143/65   08/07/17 118/72   05/17/17 127/82       The ASCVD Risk score (Hugoton KENYA Jr, et al., 2013) failed to calculate for the following reasons: 8.8% from 2013    PHQ-9 score:  No flowsheet data found.      Assessment                                                       Dyspnea, likely obstructive and restrictive -  uncontrolled     On Flovent 110 2 puffs BID and albuterol PRN that she needs every 4 hours    Ostoporosis -  uncontrolled     Last DEXA was 5/2016 and T score was -2.7    Not currently on bisphosphonate as she thought it  caused stomach pain and also ran out of refills    ASCVD risk -  uncontrolled     ASCVD risk calculated with lipid levels from 2013 was 8.8%    Not on a statin medication      Plan/Recommendations                                                       Updated medication list in the EMR; deleted meds patient no longer taking and added meds patient is now taking, and changed doses where there was a dose discrepancy.    All medications were reviewed and found to be indicated, effective, safe and convenient/ affordable unless drug therapy problem(s) was/were identified, as are described below.      Completed at this visit     Osteoporosis    Re-started alendronate    Provided instruction on proper administration, and utilized teach-back to ensure that patient understood    To be completed at a future visit  ASCVD risk 8.8%    Consider repeat lipids and moderate to high dose statin.     Re-assess dyspnea symptoms    Options for treatment and/or follow-up care were reviewed with the patient.  Lara was engaged and actively involved in the decision making process, verbalized understanding of the options discussed, and was satisfied with the final plan.    Follow-up                                                       Patient should follow up with Dr. Bates.  Patient was provided with written instructions/medication list via AVS.     Dr. Bates was provided the recommendations above  in clinic today and Dr. Campbell was available for supervision during this visit and is the authorizing prescriber for this visit through the pharmacist collaborative practice agreement.    Nelly Saab MD    Drug therapy problems identified  1. Med: Alendronate - Indication - Additional therapy indicated - Resolution: Intiate Drug; resolved  2. Med: Statin - Indication - Additional therapy indicated - Resolution: Intiate Drug; deferred to future visit     # of medical conditions addressed: 3  # of medications addressed: 16  # of medication  discrepancies identified: 9  # of DTP identified: 2  Time spent: 20 minutes  Level of service: 3, no charge    Preceptor attestation:  Patient discussed with the resident.   Assessment and plan reviewed with resident and agreed upon.  Supervising pharmacist: Jacqueline Campbell  [facility]    Jacqueline Campbell, Pharm.D.

## 2017-09-18 NOTE — NURSING NOTE
name:  Yamini De La Fuente Mai  Language: Korean  Agency: [facility]  Phone number: [phone]

## 2017-09-19 ASSESSMENT — ASTHMA QUESTIONNAIRES: ACT_TOTALSCORE: 7

## 2017-09-21 DIAGNOSIS — M81.0 AGE-RELATED OSTEOPOROSIS WITHOUT CURRENT PATHOLOGICAL FRACTURE: ICD-10-CM

## 2017-10-09 ENCOUNTER — OFFICE VISIT (OUTPATIENT)
Dept: FAMILY MEDICINE | Facility: CLINIC | Age: 72
End: 2017-10-09

## 2017-10-09 VITALS
OXYGEN SATURATION: 95 % | SYSTOLIC BLOOD PRESSURE: 119 MMHG | TEMPERATURE: 98.1 F | DIASTOLIC BLOOD PRESSURE: 79 MMHG | BODY MASS INDEX: 18.22 KG/M2 | WEIGHT: 99 LBS | HEIGHT: 62 IN | HEART RATE: 88 BPM

## 2017-10-09 DIAGNOSIS — Z23 NEED FOR VACCINATION: Primary | ICD-10-CM

## 2017-10-09 NOTE — MR AVS SNAPSHOT
After Visit Summary   10/9/2017    Lara Frost    MRN: [id number]           Patient Information     Date Of Birth          1945        Visit Information        Provider Department      10/9/2017 8:40 AM Carlos A Bates MD [facility]        Today's Diagnoses     Need for vaccination    -  1      Care Instructions    Your lungs are good   allergies after good   Flu shot today  Fu 1 to 2 months          Follow-ups after your visit        Who to contact     Please call your clinic at [phone] to:    Ask questions about your health    Make or cancel appointments    Discuss your medicines    Learn about your test results    Speak to your doctor   If you have compliments or concerns about an experience at your clinic, or if you wish to file a complaint, please contact [facility] Patient Relations at [phone] or email us at [email or web address]         Additional Information About Your Visit        MyChart Information     Akdemiat is an electronic gateway that provides easy, online access to your medical records. With ownCloud, you can request a clinic appointment, read your test results, renew a prescription or communicate with your care team.     To sign up for Akdemiat visit the website at [email or web address]   You will be asked to enter the access code listed below, as well as some personal information. Please follow the directions to create your username and password.     Your access code is: [email or web address]  Expires: 2017  2:25 PM     Your access code will  in 90 days. If you need help or a new code, please contact your [facility] or call [phone] for assistance.        Care EveryWhere ID     This is your Care EveryWhere ID. This could be used by other organizations to access your [facility] medical records  [id number]        Your Vitals Were     Pulse Temperature Height Pulse Oximetry BMI (Body Mass  "Index)       88 98.1  F (36.7  C) (Oral) 5' 2\" (157.5 cm) 95% 18.11 kg/m2        Blood Pressure from Last 3 Encounters:   10/09/17 119/79   09/18/17 143/65   08/07/17 118/72    Weight from Last 3 Encounters:   10/09/17 99 lb (44.9 kg)   09/18/17 97 lb (44 kg)   08/07/17 100 lb 12.8 oz (45.7 kg)              We Performed the Following     ADMIN INFLUENZA VIRUS VAC (MEDICARE)     FLU VACCINE, INCREASED ANTIGEN, PRESV FREE        Primary Care Provider Office Phone # Fax #    Carlos A Bates [phone] [address] [facility] [address]        Equal Access to Services     ENRIKE CARPENTER : Joanie cheng Sourvashi, waaxda luqadaha, qaybta kaalmada adeegyada, remy dickson . So [facility] [phone].    ATENCIÓN: Si habla español, tiene a florez disposición servicios gratuitos de asistencia lingüística. Ll[facility] [phone].    We comply with applicable federal civil rights laws and [place] laws. We do not discriminate on the basis of race, color, national origin, age, disability, sex, sexual orientation, or gender identity.            Thank you!     Thank you for choosing [facility]  for your care. Our goal is always to provide you with excellent care. Hearing back from our patients is one way we can continue to improve our services. Please take a few minutes to complete the written survey that you may receive in the mail after your visit with us. Thank you!             Your Updated Medication List - Protect others around you: Learn how to safely use, store and throw away your medicines at www.disposemymeds.org.          This list is accurate as of: 10/9/17  2:11 PM.  Always use your most recent med list.                   Brand Name Dispense Instructions for use Diagnosis    acetaminophen 325 MG tablet    TYLENOL    100 tablet    One to two as needed for knee pain    Pain in both knees, unspecified chronicity       * albuterol (2.5 MG/3ML) 0.083% neb solution     360 mL    " Take 1 vial (2.5 mg) by nebulization every 6 hours as needed for shortness of breath / dyspnea or wheezing    Chronic restrictive lung disease       * albuterol 108 (90 BASE) MCG/ACT Inhaler    PROAIR HFA/PROVENTIL HFA/VENTOLIN HFA    1 Inhaler    Inhale 2 puffs into the lungs every 6 hours as needed for shortness of breath / dyspnea or wheezing    Chronic obstructive pulmonary disease, unspecified COPD type (H)       alendronate 70 MG tablet    FOSAMAX    12 tablet    Take 1 tablet (70 mg) by mouth every 7 days Take 60 minutes before am meal with 8 oz. water. Remain upright for 30 minutes.    Other osteoporosis without current pathological fracture       calcium carbonate-vitamin D 600-400 MG-UNIT Chew    CALTRATE 600+D    180 tablet    Take 1 chew tab by mouth 2 times daily    Age-related osteoporosis without current pathological fracture       cetirizine 10 MG tablet    zyrTEC    90 tablet    Take 1 tablet (10 mg) by mouth every evening    Seasonal allergic rhinitis, unspecified allergic rhinitis trigger       fluticasone 110 MCG/ACT Inhaler    FLOVENT HFA    1 Inhaler    Inhale 2 puffs into the lungs 2 times daily    Chronic obstructive pulmonary disease, unspecified COPD type (H)       psyllium 0.52 G capsule     60 capsule    Take 1 capsule (0.52 g) by mouth 2 times daily    Slow transit constipation       vitamin D 2000 UNITS tablet     100 tablet    Take 2,000 Units by mouth daily    Pain in both knees, unspecified chronicity       * Notice:  This list has 2 medication(s) that are the same as other medications prescribed for you. Read the directions carefully, and ask your doctor or other care provider to review them with you.

## 2017-10-09 NOTE — PROGRESS NOTES
"S: Lara Frost is a 72 year old female who returns for follow up of  SOB/restricitve-copd.   Patients states that main concern today is  Flu shot    PMHX/PSHX/MEDS/ALLERGIES/SHX/FHX reviewed and updated in Epic.      ROS:  General: No fevers, chills  Head: No headache  Ears: No acute change in hearing.    CV: No chest pain or palpitations.  Resp: No shortness of breath.  No cough. No hemoptysis.  GI: No nausea, vomiting, constipation, diarrhea  : No urinary pains    O: /79  Pulse 88  Temp 98.1  F (36.7  C) (Oral)  Ht 5' 2\" (157.5 cm)  Wt 99 lb (44.9 kg)  SpO2 95%  BMI 18.11 kg/m2   Gen:  Well nourished and in NAD    Neck: supple without lymphadenopathy  CV:  RRR  - no murmurs, rubs, or gallups,   Pulm:  CTAB, no wheezes/rales/rhonchi, good air entry   ABD: soft, nontender, no masses, no rebound, BS intact throughout  Extrem: no cyanosis, edema or clubbing  Psych: Euthymic     Restrictive lung disease; at base line   Flu shot  Seasonal allergies; stable    RTC in 12weeks, for follow up of   Medical  And Care coordiantion or sooner if develops new or worsening symptoms.    Carlos A Bates      "

## 2017-10-09 NOTE — NURSING NOTE
name: Evelinlalo Renteria  Language: Czech  Agency: [facility]  Phone number: [phone]    [facility]      1.  Has the patient received the information for the influenza vaccine? YES    2.  Does the patient have any of the following contraindications?     Allergy to eggs? No     Allergic reaction to previous influenza vaccines? No     Any other problems to previous influenza vaccines? No     Paralyzed by Guillain-[place] syndrome? No     Currently pregnant? NO     Current moderate or severe illness? No    Vaccination given by Tabitha Rivas CMA.  Recorded by Tabitha Rivas

## 2017-10-10 ASSESSMENT — ASTHMA QUESTIONNAIRES: ACT_TOTALSCORE: 17

## 2017-11-24 DIAGNOSIS — J44.9 CHRONIC OBSTRUCTIVE PULMONARY DISEASE, UNSPECIFIED COPD TYPE (H): ICD-10-CM

## 2017-11-26 RX ORDER — FLUTICASONE PROPIONATE 110 UG/1
2 AEROSOL, METERED RESPIRATORY (INHALATION) 2 TIMES DAILY
Qty: 1 INHALER | Refills: 3 | Status: SHIPPED | OUTPATIENT
Start: 2017-11-26 | End: 2018-08-30

## 2017-11-28 NOTE — TELEPHONE ENCOUNTER
"Pharmacy states, \"Patient was using 220MCG inhaler.  Last fill 11/22/17.  Is this a new dose for her?\"  Please advise.    "

## 2017-12-08 DIAGNOSIS — J44.9 CHRONIC OBSTRUCTIVE PULMONARY DISEASE, UNSPECIFIED COPD TYPE (H): ICD-10-CM

## 2017-12-08 RX ORDER — FLUTICASONE PROPIONATE 220 UG/1
2 AEROSOL, METERED RESPIRATORY (INHALATION) 2 TIMES DAILY
Qty: 3 INHALER | Refills: 1 | Status: SHIPPED | OUTPATIENT
Start: 2017-12-08 | End: 2019-01-07

## 2017-12-13 ENCOUNTER — TELEPHONE (OUTPATIENT)
Dept: FAMILY MEDICINE | Facility: CLINIC | Age: 72
End: 2017-12-13

## 2017-12-13 NOTE — TELEPHONE ENCOUNTER
Flovent is not covered per [facility]. Please send Arnuity Ellipta--mostly likely to be covered, if appropriate.    Routed to Dr. Bates, PCP. /DIO Thomas

## 2018-01-30 DIAGNOSIS — J44.9 CHRONIC OBSTRUCTIVE PULMONARY DISEASE, UNSPECIFIED COPD TYPE (H): ICD-10-CM

## 2018-01-31 RX ORDER — ALBUTEROL SULFATE 90 UG/1
2 AEROSOL, METERED RESPIRATORY (INHALATION) EVERY 6 HOURS PRN
Qty: 1 INHALER | Refills: 3 | Status: SHIPPED | OUTPATIENT
Start: 2018-01-31 | End: 2018-09-07

## 2018-05-18 ENCOUNTER — AMBULATORY - HEALTHEAST (OUTPATIENT)
Dept: PULMONOLOGY | Facility: OTHER | Age: 73
End: 2018-05-18

## 2018-05-18 DIAGNOSIS — D82.4 HYPERIMMUNOGLOBULIN E (IGE) SYNDROME (H): ICD-10-CM

## 2018-08-30 ENCOUNTER — OFFICE VISIT (OUTPATIENT)
Dept: FAMILY MEDICINE | Facility: CLINIC | Age: 73
End: 2018-08-30
Payer: MEDICARE

## 2018-08-30 VITALS
TEMPERATURE: 98.5 F | BODY MASS INDEX: 18.33 KG/M2 | RESPIRATION RATE: 20 BRPM | OXYGEN SATURATION: 95 % | SYSTOLIC BLOOD PRESSURE: 141 MMHG | DIASTOLIC BLOOD PRESSURE: 79 MMHG | HEIGHT: 62 IN | WEIGHT: 99.6 LBS | HEART RATE: 91 BPM

## 2018-08-30 DIAGNOSIS — J30.89 CHRONIC NONSEASONAL ALLERGIC RHINITIS DUE TO OTHER ALLERGEN: Primary | ICD-10-CM

## 2018-08-30 DIAGNOSIS — J98.4 DISEASE OF LUNG: ICD-10-CM

## 2018-08-30 RX ORDER — FLUTICASONE PROPIONATE 50 MCG
1-2 SPRAY, SUSPENSION (ML) NASAL DAILY
Qty: 1 BOTTLE | Refills: 11 | COMMUNITY
Start: 2018-08-30 | End: 2019-01-07

## 2018-08-30 RX ORDER — CETIRIZINE HYDROCHLORIDE 10 MG/1
10 TABLET ORAL EVERY EVENING
Qty: 90 TABLET | Refills: 1 | Status: SHIPPED | OUTPATIENT
Start: 2018-08-30 | End: 2022-10-06

## 2018-08-30 ASSESSMENT — ACTIVITIES OF DAILY LIVING (ADL)
BATHING: 0-->INDEPENDENT
BATHING: 0 - INDEPENDENT
TOILETING: 0-->INDEPENDENT
FALL_HISTORY_WITHIN_LAST_SIX_MONTHS: NO
AMBULATION: 0 - INDEPENDENT
COMMUNICATION: 0 - UNDERSTANDS/COMMUNICATES WITHOUT DIFFICULTY
TOILETING: 0 - INDEPENDENT
COGNITION: 0 - NO COGNITION ISSUES REPORTED
TRANSFERRING: 0 - INDEPENDENT
AMBULATION: 0-->INDEPENDENT
TRANSFERRING: 0-->INDEPENDENT
EATING: 0 - INDEPENDENT
RETIRED_COMMUNICATION: 0-->UNDERSTANDS/COMMUNICATES WITHOUT DIFFICULTY
SWALLOWING: 0-->SWALLOWS FOODS/LIQUIDS WITHOUT DIFFICULTY
RETIRED_EATING: 0-->INDEPENDENT
DRESS: 0 - INDEPENDENT
SWALLOWING: 0 - SWALLOWS FOODS/LIQUIDS WITHOUT DIFFICULTY
CHANGE_IN_FUNCTIONAL_STATUS_SINCE_ONSET_OF_CURRENT_ILLNESS/INJURY: NO
DRESS: 0-->INDEPENDENT

## 2018-08-30 ASSESSMENT — PAIN SCALES - GENERAL: PAINLEVEL: NO PAIN (0)

## 2018-08-30 NOTE — PROGRESS NOTES
S: Lara Frost is a 73 year old female who returns for follow up of  Asthma/med refills.   Patients states that main concern today is coughing spells    PMHX/PSHX/MEDS/ALLERGIES/SHX/FHX reviewed and updated in Epic.      ROS:  General: No fevers, chills  Head: No headache  Ears: No acute change in hearing.    CV: No chest pain or palpitations.  Resp: No shortness of breath.  No cough. No hemoptysis.  GI: No nausea, vomiting, constipation, diarrhea  : No urinary pains    O: There were no vitals taken for this visit.   Gen:  Well nourished and in NAD  HEENT: PERRLA; TMs normal color and landmarks; nasopharynx pink and moist; oropharynx pink and moist  Neck: supple without lymphadenopathy  CV:  RRR  - no murmurs, rubs, or gallups,   Pulm:  CTAB, no wheezes/rales/rhonchi, good air entry   ABD: soft, nontender, no masses, no rebound, BS intact throughout  Extrem: no cyanosis, edema or clubbing  Psych: Euthymic      Follow-up cough/exam is normal   Has been seen by pulmonary medicine/was referred to immunology   Meds reconciliation   Consider follow with immunology as suggested by pulmonary medicine  Refill cetrizine for chronic allergic rhinitis    RTC  Routine care or sooner if develops new or worsening symptoms.    Carlos A Bates

## 2018-08-30 NOTE — MR AVS SNAPSHOT
After Visit Summary   2018    Lara Frost    MRN: [id number]           Patient Information     Date Of Birth          1945        Visit Information        Provider Department      2018 9:40 AM Carlos A Bates MD [facility]        Today's Diagnoses     Chronic nonseasonal allergic rhinitis due to other allergen    -  1    Disease of lung          Care Instructions    Please start taking your Zyrtec every day during allergy season again.  Follow-up with your allergist if you continue to have breathing problems.  Thank you for allowing me to be a part of your care today!  Dr. Martins, Pharmacist          Follow-ups after your visit        Who to contact     Please call your clinic at [phone] to:    Ask questions about your health    Make or cancel appointments    Discuss your medicines    Learn about your test results    Speak to your doctor            Additional Information About Your Visit        MyChart Information     Tweegee is an electronic gateway that provides easy, online access to your medical records. With Tweegee, you can request a clinic appointment, read your test results, renew a prescription or communicate with your care team.     To sign up for Tweegee visit the website at [email or web address]   You will be asked to enter the access code listed below, as well as some personal information. Please follow the directions to create your username and password.     Your access code is: [email or web address]  Expires: 2018 10:43 AM     Your access code will  in 90 days. If you need help or a new code, please contact your [facility] or call [phone] for assistance.        Care EveryWhere ID     This is your Care EveryWhere ID. This could be used by other organizations to access your [place] medical records  [id number]        Your Vitals Were     Pulse Temperature Respirations Height Pulse Oximetry Breastfeeding?    91 98.5  F  "(36.9  C) (Oral) 20 5' 1.61\" (156.5 cm) 95% No    BMI (Body Mass Index)                   18.45 kg/m2            Blood Pressure from Last 3 Encounters:   08/30/18 141/79   10/09/17 119/79   09/18/17 143/65    Weight from Last 3 Encounters:   08/30/18 99 lb 9.6 oz (45.2 kg)   10/09/17 99 lb (44.9 kg)   09/18/17 97 lb (44 kg)              Today, you had the following     No orders found for display         Today's Medication Changes          These changes are accurate as of 8/30/18 10:43 AM.  If you have any questions, ask your nurse or doctor.               Start taking these medicines.        Dose/Directions    cetirizine 10 MG tablet   Commonly known as:  zyrTEC   Used for:  Chronic nonseasonal allergic rhinitis due to other allergen   Started by:  Carlos A Bates MD        Dose:  10 mg   Take 1 tablet (10 mg) by mouth every evening   Quantity:  90 tablet   Refills:  1            Where to get your medicines      These medications were sent to [facility] - [place], [place] - [address], [place] [address]     Phone:  [phone]     cetirizine 10 MG tablet                Primary Care Provider Office Phone # Fax #    Carlos A Bates [phone]       [address] [place] [address]        Equal Access to Services     ENRIKE CARPENTER AH: Joanie Parham, waaxda lujim, qaybta kaalremy dominguez . So [facility] [phone].    ATENCIÓN: Si habla español, tiene a florez disposición servicios gratuitos de asistencia lingüística. Salomón al [phone].    We comply with applicable federal civil rights laws and [place] laws. We do not discriminate on the basis of race, color, national origin, age, disability, sex, sexual orientation, or gender identity.            Thank you!     Thank you for choosing [facility]  for your care. Our goal is always to provide you with excellent care. Hearing back from our patients is one way " we can continue to improve our services. Please take a few minutes to complete the written survey that you may receive in the mail after your visit with us. Thank you!             Your Updated Medication List - Protect others around you: Learn how to safely use, store and throw away your medicines at www.disposemymeds.org.          This list is accurate as of 8/30/18 10:43 AM.  Always use your most recent med list.                   Brand Name Dispense Instructions for use Diagnosis    acetaminophen 325 MG tablet    TYLENOL    100 tablet    One to two as needed for knee pain    Pain in both knees, unspecified chronicity       albuterol 108 (90 Base) MCG/ACT inhaler    PROAIR HFA/PROVENTIL HFA/VENTOLIN HFA    1 Inhaler    Inhale 2 puffs into the lungs every 6 hours as needed for shortness of breath / dyspnea or wheezing    Chronic obstructive pulmonary disease, unspecified COPD type (H)       alendronate 70 MG tablet    FOSAMAX    12 tablet    Take 1 tablet (70 mg) by mouth every 7 days Take 60 minutes before am meal with 8 oz. water. Remain upright for 30 minutes.    Other osteoporosis without current pathological fracture       calcium carbonate-vitamin D 600-400 MG-UNIT Chew    CALTRATE 600+D    180 tablet    Take 1 chew tab by mouth 2 times daily    Age-related osteoporosis without current pathological fracture       cetirizine 10 MG tablet    zyrTEC    90 tablet    Take 1 tablet (10 mg) by mouth every evening    Chronic nonseasonal allergic rhinitis due to other allergen       fluticasone 220 MCG/ACT Inhaler    FLOVENT HFA    3 Inhaler    Inhale 2 puffs into the lungs 2 times daily    Chronic obstructive pulmonary disease, unspecified COPD type (H)       fluticasone 50 MCG/ACT spray    FLONASE    1 Bottle    Spray 1-2 sprays into both nostrils daily    Chronic nonseasonal allergic rhinitis due to other allergen       vitamin D 2000 units tablet     100 tablet    Take 2,000 Units by mouth daily    Pain in both  knees, unspecified chronicity

## 2018-08-30 NOTE — NURSING NOTE
Chief Complaint   Patient presents with     RECHECK     [facility]- MEDS CHECK F/U     Patrice George CMA      Patient declined interpreting services for this visit. Her son is helping with translation.     Patrice George CMA

## 2018-08-30 NOTE — PROGRESS NOTES
Medication Management Note                                                       Lara was referred by Dr. Bates for pharmacy services for CMM.    MEDICATION REVIEW:  Discussed all medication indications, dosage and effectiveness, adverse effects, and adherence with patient/caregiver.    Pt had meds with them: no, except for albuterol inhaler in purse  Pt had med list with them: no  Pt was knowledgeable about meds: yes  Medications set up by: Self  Medications administered by someone else (e.g., LTCF): No  Pt uses a medication box or automated dispenser: no  Called pharmacy to obtain or clarify med list:  Yes, clarified which Flovent dose patient is taking  Called [facility] or [facility] to obtain or clarify med list:  no    Medication Discrepancies  Medications on EMR med list that pt is NOT taking:  yes, Albuterol neb, Psyllium, Cetirizine  Medications pt IS taking that are NOT on EMR med list (e.g., from specialist, hospital): none  OTC meds/ dietary supplements pt taking on own that are NOT on EMR med list:  yes, Flonase  Dosage listed differently than how patient is taking: none  Frequency listed differently than how patient is taking: none  Duplicate medication on list (two occurrences of the same medication):  yes, Flovent  TOTAL NUMBER OF MEDICATION DISCREPANCIES:  5    Subjective                                                       Patient reports the following problems or concerns with their medications:  yes, does not want to take a lot of meds  Patient reports the following adverse reactions to medications:  none  Pt reports missing doses:  2 to 3 times per month for Flovent, Never for Alendronate  Additional subjective information (e.g., reason for visit, frequency of PRNs, reasons meds were D/C ed):    Patient has complaints of breathing difficulties and coughing    Patient has been using albuterol inhaler about 3 times daily    Uses when coughing a lot and can't breathe; typically when exerting self    Hasn't  used albuterol neb in over 6 months    Uses tylenol seldomly,maybe every 2 weeks, for HA's or knee pain    Takes alendronate every Monday and never forgets    Patient doesn't like to take a lot of meds, so has not been taking Zyrtec; states using Flonase daily    Objective                                                       Patient Active Problem List   Diagnosis     Sebaceous cyst     Edema of larynx     Eczema     Tear film insufficiency     Disease of lung     Chronic rhinitis     Restrictive lung disease     Osteoporosis without pathological fracture     Near syncope       Current Outpatient Prescriptions   Medication Sig Dispense Refill     acetaminophen (TYLENOL) 325 MG tablet One to two as needed for knee pain 100 tablet 0     albuterol (PROAIR HFA/PROVENTIL HFA/VENTOLIN HFA) 108 (90 BASE) MCG/ACT Inhaler Inhale 2 puffs into the lungs every 6 hours as needed for shortness of breath / dyspnea or wheezing 1 Inhaler 3     alendronate (FOSAMAX) 70 MG tablet Take 1 tablet (70 mg) by mouth every 7 days Take 60 minutes before am meal with 8 oz. water. Remain upright for 30 minutes. 12 tablet 3     calcium carbonate-vitamin D (CALTRATE 600+D) 600-400 MG-UNIT CHEW Take 1 chew tab by mouth 2 times daily 180 tablet 3     cetirizine (ZYRTEC) 10 MG tablet Take 1 tablet (10 mg) by mouth every evening 90 tablet 1     Cholecalciferol (VITAMIN D) 2000 UNITS tablet Take 2,000 Units by mouth daily 100 tablet 3     fluticasone (FLONASE) 50 MCG/ACT spray Spray 1-2 sprays into both nostrils daily 1 Bottle 11     fluticasone (FLOVENT HFA) 220 MCG/ACT Inhaler Inhale 2 puffs into the lungs 2 times daily 3 Inhaler 1     [DISCONTINUED] albuterol (2.5 MG/3ML) 0.083% nebulizer solution Take 1 vial (2.5 mg) by nebulization every 6 hours as needed for shortness of breath / dyspnea or wheezing 360 mL 3     [DISCONTINUED] fluticasone (FLOVENT HFA) 110 MCG/ACT Inhaler Inhale 2 puffs into the lungs 2 times daily 1 Inhaler 3       Social  History   Substance Use Topics     Smoking status: Never Smoker     Smokeless tobacco: Never Used     Alcohol use No       CrCl cannot be calculated (Patient's most recent sCr result is older than the maximum 90 days allowed.).    No results found for: A1C  Last Comprehensive Metabolic Panel:  Sodium   Date Value Ref Range Status   12/29/2014 141 136 - 145 mmol/L Final     Potassium   Date Value Ref Range Status   12/29/2014 4.0 3.5 - 5.0 mmol/L Final     Chloride   Date Value Ref Range Status   12/29/2014 102 98 - 107 mmol/L Final     Glucose   Date Value Ref Range Status   12/29/2014 85 70 - 125 mg/dL Final     Urea Nitrogen   Date Value Ref Range Status   12/29/2014 11 8 - 22 mg/dL Final     Creatinine   Date Value Ref Range Status   12/29/2014 0.62 0.60 - 1.10 mg/dL Final     GFR Estimate   Date Value Ref Range Status   12/29/2014 >60 >60 mL/min/1.73m2 Final     Calcium   Date Value Ref Range Status   12/29/2014 9.3 8.5 - 10.5 mg/dL Final       BP Readings from Last 3 Encounters:   08/30/18 141/79   10/09/17 119/79   09/18/17 143/65       The ASCVD Risk score ([place] KENYA Jr, et al., 2013) failed to calculate for the following reasons:    Cannot find a previous HDL lab    Cannot find a previous total cholesterol lab    PHQ-9 score:  No flowsheet data found.      Assessment                                                       Allergies/Breathing Problems -  uncontrolled     Saw [facility] August 2017 - recommended referral to immunology    No dx of Asthma/COPD    Adherent to Flovent    Patient has not been taking Zyrtec    Osteoporosis -  controlled     Patient adherent to Alendronate, Calcium carbonate/Vit D, Vit D    Plan/Recommendations                                                       Updated medication list in the EMR; deleted meds patient no longer taking and added meds patient is now taking, and changed doses where there was a dose discrepancy.    All medications were reviewed and found to be  indicated, effective, safe and convenient/ affordable unless drug therapy problem(s) was/were identified, as are described below.      Completed at this visit     Allergies/Breathing Problems    Educated patient on importance of taking Zyrtec to help with allergy symptoms    Patient agreed to take; Provided refill    Educated patient on use of Albuterol inhaler as PRN and reviewed proper technique    Patient demonstrated proper technique; agreed to try to use only if truly needed    Informed patient if breathing problems/coughing continued to follow-up with allergist per Dr. Bates's recommendation    Osteoporosis    Ensured patient was administering Alendronate properly    Patient takes on empty stomach upon waking about an hour before eating with full glass of water; does not lie down for at least 30 minutes after taking    To be completed at a future visit  Allergies/Breathing Problems    Follow-up if/after patient schedules appointment with allergist    Reassess albuterol inhaler use    Labs    Consider obtaining lipid panel and vitamin D labs at next visit      Options for treatment and/or follow-up care were reviewed with the patient.  Lara was engaged and actively involved in the decision making process, verbalized understanding of the options discussed, and was satisfied with the final plan.    Follow-up                                                       Patient should follow up with Dr. Bates.  Patient was provided with written instructions/medication list via AVS.     Dr. Bates was provided the recommendations above  in clinic today and Dr. Rendon was available for supervision during this visit and is the authorizing prescriber for this visit through the pharmacist collaborative practice agreement.    Celeste Martins, PharmD Resident      Drug therapy problems identified  1. Med: Zyrtec - Compliance - Patient not taking - Resolution: Educate patient; resolved  2. Med: Albuterol inhaler - Compliance -  Patient misunderstanding - Resolution: Educate patient; resolved     # of medical conditions addressed: 2  # of medications addressed: 11  # of medication discrepancies identified: 5  # of DTP identified: 2  Time spent: 30 minutes  Level of service: 3NC    Due to patient being non-English speaking, an  was offered for this visit. Patient declined interpreting services for this visit and had her son help with translation.

## 2018-08-30 NOTE — PROGRESS NOTES
I was present with the pharmacy student who participated in the service and in the documentation of this note. I have verified the history, personally performed the medical decision making, and have verified the content of the note, which accurately reflects my assessment of the patient and the plan of care.   Adriane Ramires, PharmD

## 2018-08-30 NOTE — PATIENT INSTRUCTIONS
Please start taking your Zyrtec every day during allergy season again.  Follow-up with your allergist if you continue to have breathing problems.  Thank you for allowing me to be a part of your care today!  Dr. Martins, Pharmacist

## 2018-09-07 DIAGNOSIS — J44.9 CHRONIC OBSTRUCTIVE PULMONARY DISEASE, UNSPECIFIED COPD TYPE (H): ICD-10-CM

## 2018-09-07 DIAGNOSIS — M81.0 AGE-RELATED OSTEOPOROSIS WITHOUT CURRENT PATHOLOGICAL FRACTURE: ICD-10-CM

## 2018-09-07 DIAGNOSIS — M81.8 OTHER OSTEOPOROSIS WITHOUT CURRENT PATHOLOGICAL FRACTURE: ICD-10-CM

## 2018-09-07 RX ORDER — ALENDRONATE SODIUM 70 MG/1
70 TABLET ORAL
Qty: 12 TABLET | Refills: 3 | Status: SHIPPED | OUTPATIENT
Start: 2018-09-07 | End: 2018-12-06

## 2018-09-07 RX ORDER — ALBUTEROL SULFATE 90 UG/1
2 AEROSOL, METERED RESPIRATORY (INHALATION) EVERY 6 HOURS PRN
Qty: 1 INHALER | Refills: 3 | Status: SHIPPED | OUTPATIENT
Start: 2018-09-07 | End: 2018-12-06

## 2018-10-12 ENCOUNTER — OFFICE VISIT (OUTPATIENT)
Dept: FAMILY MEDICINE | Facility: CLINIC | Age: 73
End: 2018-10-12
Payer: MEDICARE

## 2018-10-12 VITALS
DIASTOLIC BLOOD PRESSURE: 83 MMHG | RESPIRATION RATE: 20 BRPM | BODY MASS INDEX: 18.26 KG/M2 | HEIGHT: 62 IN | TEMPERATURE: 99 F | WEIGHT: 99.2 LBS | SYSTOLIC BLOOD PRESSURE: 125 MMHG | HEART RATE: 105 BPM | OXYGEN SATURATION: 96 %

## 2018-10-12 DIAGNOSIS — R09.81 NASAL CONGESTION: ICD-10-CM

## 2018-10-12 DIAGNOSIS — J06.9 VIRAL URI WITH COUGH: Primary | ICD-10-CM

## 2018-10-12 RX ORDER — OXYMETAZOLINE HYDROCHLORIDE 0.05 G/100ML
2-3 SPRAY NASAL 2 TIMES DAILY
Qty: 15 ML | Refills: 0 | Status: SHIPPED | OUTPATIENT
Start: 2018-10-12 | End: 2018-10-15

## 2018-10-12 RX ORDER — ECHINACEA PURPUREA EXTRACT 125 MG
1 TABLET ORAL DAILY PRN
Qty: 30 ML | Refills: 1 | Status: SHIPPED | OUTPATIENT
Start: 2018-10-12 | End: 2022-10-06

## 2018-10-12 ASSESSMENT — PAIN SCALES - GENERAL: PAINLEVEL: EXTREME PAIN (8)

## 2018-10-12 NOTE — PATIENT INSTRUCTIONS
1. Viral URI with cough  - continue fluticasone twice daily and albuterol as needed    2. Nasal congestion  - sodium chloride ([employer]S SALINE NASAL SPRAY) 0.65 % nasal spray; Spray 1 spray into both nostrils daily as needed for congestion  Dispense: 30 mL; Refill: 1  - can also try: oxymetazoline (AFRIN NASAL SPRAY) 0.05 % spray; Spray 2-3 sprays into both nostrils 2 times daily for 3 days  Dispense: 15 mL; Refill: 0    Follow up Monday/Tuesday if no better or sooner if worsening fevers, shortness of breath, wheezing or any other symptoms

## 2018-10-12 NOTE — PROGRESS NOTES
"  ASSESSMENT AND PLAN   73-year-old female with history of abnormal diffusion capacity and bronchodilator response on PFTs but no restriction or obstruction, osteoporosis, eczema coming in with 2 days of nasal congestion and cough.  She does note wheezing, shortness of breath and chest tightness but only while coughing.  Most bothersome to her is her nasal congestion and runny nose.  She denies any sinus or tooth pain.  She has been using her Flovent as prescribed and has used albuterol 2-3 times daily with some relief.  She is afebrile, oxygen saturations are at baseline, she is normotensive and slightly tachycardic but this is not out of the normal for her.  She is overall quite well-appearing.  She has nasal congestion but HEENT is otherwise unremarkable.  She has somewhat diminished air movement throughout, unsure if this is her baseline given her \"lung disease\" that has not been fully worked up. She has no overt wheezing or consolidations.  She does not appear to have any type of significant reactive airway disease exacerbation today.  She is satting at her baseline and is getting improvement from albuterol as needed.  She does not have signs of pneumonia today.  Influenza is less likely given how well she looks overall.  As nasal congestion is most bothersome to her, will have her treat this aggressively.  Continue her inhalers as needed.    1. Viral URI with cough  - continue fluticasone twice daily and albuterol as needed    2. Nasal congestion  - sodium chloride (CVS SALINE NASAL SPRAY) 0.65 % nasal spray; Spray 1 spray into both nostrils daily as needed for congestion  Dispense: 30 mL; Refill: 1  - can also try: oxymetazoline (AFRIN NASAL SPRAY) 0.05 % spray; Spray 2-3 sprays into both nostrils 2 times daily for 3 days  Dispense: 15 mL; Refill: 0    Follow up Monday/Tuesday if no better or sooner if worsening fevers, shortness of breath, wheezing or any other symptoms    The patient was seen by, and " "discussed with, Dr. Carvalho who agrees with the plan.    Jen Conroy MD  PGY-3  Pager # [phone]         SUBJECTIVE       Lara Margot is a 73 year old  female with a PMH significant for:     Patient Active Problem List   Diagnosis     Sebaceous cyst     Edema of larynx     Eczema     Tear film insufficiency     Disease of lung     Chronic rhinitis     Restrictive lung disease     Osteoporosis without pathological fracture     Near syncope     She presents with cold symptoms.    Patient reports 2 days of symptoms.  She notes nasal congestion and runny nose.  She notes cough that seems to be a little bit worse at night.  It is productive of white sputum that is nonbloody.  She notes some shortness of breath and wheezing but only while coughing.  She also notes chest tightness but only with coughing.  She has a mild sore throat.  She notes mild chills but no significant fevers or myalgias.  No sick contacts, recent travel or recent antibiotic use.  She has a history of lung disease, on chart review, she saw pulmonology about 1 year ago and had PFTs which showed no restriction or obstruction but some bronchodilator response and abnormal DLCO.  She was due for more workup but this has not been completed.  She currently takes Flovent twice daily as well as albuterol as needed.  States she has been compliant with her Flovent.  She has been using albuterol 2-3 times daily for the past couple of days and this seems to be helpful.  She last used it around 7 AM today.    Patient declines formal , her son is acting as her  today    PMH, Medications and Allergies were reviewed and updated as needed.        REVIEW OF SYSTEMS     As per HPI        OBJECTIVE     Vitals:    10/12/18 0858   BP: 125/83   Pulse: 105   Resp: 20   Temp: 99  F (37.2  C)   TempSrc: Oral   SpO2: 96%   Weight: 99 lb 3.2 oz (45 kg)   Height: 5' 2.09\" (157.7 cm)     Body mass index is 18.09 kg/(m^2).    Constitutional: Awake, " alert, cooperative, no apparent distress, and appears stated age.  Eyes: No conjunctival injection  ENT: Sinuses nontender on palpation, external ears without lesions, TMs clear bilaterally, mild clear rhinorrhea, oral pharynx with moist mucus membranes, tonsils without erythema or exudates  Neck: Supple, symmetrical, trachea midline, no adenopathy  Lungs: No increased work of breathing on room air, moderate but equal air exchange, clear to auscultation bilaterally, no crackles or wheezing or consolidations.  Cardiovascular: Regular rate and rhythm, normal S1 and S2, no S3 or S4, and no murmur noted.  Neuropsychiatric: Normal affect, mood    No results found for this or any previous visit (from the past 24 hour(s)).

## 2018-10-12 NOTE — PROGRESS NOTES
Preceptor Attestation:   Patient seen, evaluated and discussed with the resident. I have verified the content of the note, which accurately reflects my assessment of the patient and the plan of care.   Supervising Physician:  Eddie Carvalho MD

## 2018-10-12 NOTE — NURSING NOTE
Chief Complaint   Patient presents with     RECHECK     Having Flu Symptoms        By patient's choice her son is interpreting for her today.     Patrice George, CMA

## 2018-10-12 NOTE — MR AVS SNAPSHOT
After Visit Summary   10/12/2018    Lara Frost    MRN: [id number]           Patient Information     Date Of Birth          1945        Visit Information        Provider Department      10/12/2018 8:40 AM Jen Conroy MD [facility]        Today's Diagnoses     Viral URI with cough    -  1    Nasal congestion          Care Instructions    1. Viral URI with cough  - continue fluticasone twice daily and albuterol as needed    2. Nasal congestion  - sodium chloride (CVS SALINE NASAL SPRAY) 0.65 % nasal spray; Spray 1 spray into both nostrils daily as needed for congestion  Dispense: 30 mL; Refill: 1  - can also try: oxymetazoline (AFRIN NASAL SPRAY) 0.05 % spray; Spray 2-3 sprays into both nostrils 2 times daily for 3 days  Dispense: 15 mL; Refill: 0    Follow up Monday/Tuesday if no better or sooner if worsening fevers, shortness of breath, wheezing or any other symptoms            Follow-ups after your visit        Your next 10 appointments already scheduled     Nov 30, 2018  1:30 PM CST   65+ Annual Wellness with Jen Conroy MD   [facility] ([facility])    [address] [place] [address]   [phone]              Who to contact     Please call your clinic at [phone] to:    Ask questions about your health    Make or cancel appointments    Discuss your medicines    Learn about your test results    Speak to your doctor            Additional Information About Your Visit        MyChart Information     PLASTIQt is an electronic gateway that provides easy, online access to your medical records. With Workday, you can request a clinic appointment, read your test results, renew a prescription or communicate with your care team.     To sign up for PLASTIQt visit the website at [email or web address]   You will be asked to enter the access code listed below, as well as some personal information. Please follow the directions to create your username and password.    "  Your access code is: [email or web address]  Expires: 2018 10:43 AM     Your access code will  in 90 days. If you need help or a new code, please contact your [facility] or call [phone] for assistance.        Care EveryWhere ID     This is your Care EveryWhere ID. This could be used by other organizations to access your [facility] medical records  [id number]        Your Vitals Were     Pulse Temperature Respirations Height Pulse Oximetry Breastfeeding?    105 99  F (37.2  C) (Oral) 20 5' 2.09\" (157.7 cm) 96% No    BMI (Body Mass Index)                   18.09 kg/m2            Blood Pressure from Last 3 Encounters:   10/12/18 125/83   18 141/79   10/09/17 119/79    Weight from Last 3 Encounters:   10/12/18 99 lb 3.2 oz (45 kg)   18 99 lb 9.6 oz (45.2 kg)   10/09/17 99 lb (44.9 kg)              Today, you had the following     No orders found for display         Today's Medication Changes          These changes are accurate as of 10/12/18  9:22 AM.  If you have any questions, ask your nurse or doctor.               Start taking these medicines.        Dose/Directions    oxymetazoline 0.05 % spray   Commonly known as:  AFRIN NASAL SPRAY   Used for:  Nasal congestion   Started by:  Jen Conroy MD        Dose:  2-3 spray   Spray 2-3 sprays into both nostrils 2 times daily for 3 days   Quantity:  15 mL   Refills:  0       sodium chloride 0.65 % nasal spray   Commonly known as:  CVS SALINE NASAL SPRAY   Used for:  Nasal congestion   Started by:  Jen Conroy MD        Dose:  1 spray   Spray 1 spray into both nostrils daily as needed for congestion   Quantity:  30 mL   Refills:  1            Where to get your medicines      These medications were sent to [facility] - [place], [place] - [address], [place] [address]     Phone:  [phone]     oxymetazoline 0.05 % spray    sodium chloride 0.65 % nasal spray             "    Primary Care Provider Office Phone # Fax #    Carlos A Bates [phone] [address] [facility] [address]        Equal Access to Services     ENRIKE CARPENTER : Joanie cristo allen sejalwilson Denniseurvashi, miguel froilanmakayla, susanata kadwight chavez, remy batista virginiaceline goyal lasarmadbessy sheets. So [facility] [phone].    ATENCIÓN: Si habla español, tiene a florez disposición servicios gratuitos de asistencia lingüística. Llame al [phone].    We comply with applicable federal civil rights laws and [place] laws. We do not discriminate on the basis of race, color, national origin, age, disability, sex, sexual orientation, or gender identity.            Thank you!     Thank you for choosing [facility]  for your care. Our goal is always to provide you with excellent care. Hearing back from our patients is one way we can continue to improve our services. Please take a few minutes to complete the written survey that you may receive in the mail after your visit with us. Thank you!             Your Updated Medication List - Protect others around you: Learn how to safely use, store and throw away your medicines at www.disposemymeds.org.          This list is accurate as of 10/12/18  9:22 AM.  Always use your most recent med list.                   Brand Name Dispense Instructions for use Diagnosis    acetaminophen 325 MG tablet    TYLENOL    100 tablet    One to two as needed for knee pain    Pain in both knees, unspecified chronicity       albuterol 108 (90 Base) MCG/ACT inhaler    PROAIR HFA/PROVENTIL HFA/VENTOLIN HFA    1 Inhaler    Inhale 2 puffs into the lungs every 6 hours as needed for shortness of breath / dyspnea or wheezing    Chronic obstructive pulmonary disease, unspecified COPD type (H)       alendronate 70 MG tablet    FOSAMAX    12 tablet    Take 1 tablet (70 mg) by mouth every 7 days Take 60 minutes before am meal with 8 oz. water. Remain upright for 30 minutes.    Other osteoporosis without current  pathological fracture       calcium carbonate-vitamin D 600-400 MG-UNIT Chew    CALTRATE 600+D    180 tablet    Take 1 chew tab by mouth 2 times daily    Age-related osteoporosis without current pathological fracture       cetirizine 10 MG tablet    zyrTEC    90 tablet    Take 1 tablet (10 mg) by mouth every evening    Chronic nonseasonal allergic rhinitis due to other allergen       fluticasone 220 MCG/ACT Inhaler    FLOVENT HFA    3 Inhaler    Inhale 2 puffs into the lungs 2 times daily    Chronic obstructive pulmonary disease, unspecified COPD type (H)       fluticasone 50 MCG/ACT spray    FLONASE    1 Bottle    Spray 1-2 sprays into both nostrils daily    Chronic nonseasonal allergic rhinitis due to other allergen       oxymetazoline 0.05 % spray    AFRIN NASAL SPRAY    15 mL    Spray 2-3 sprays into both nostrils 2 times daily for 3 days    Nasal congestion       sodium chloride 0.65 % nasal spray    CVS SALINE NASAL SPRAY    30 mL    Spray 1 spray into both nostrils daily as needed for congestion    Nasal congestion       vitamin D 2000 units tablet     100 tablet    Take 2,000 Units by mouth daily    Pain in both knees, unspecified chronicity

## 2018-12-06 DIAGNOSIS — M81.8 OTHER OSTEOPOROSIS WITHOUT CURRENT PATHOLOGICAL FRACTURE: ICD-10-CM

## 2018-12-06 DIAGNOSIS — J44.9 CHRONIC OBSTRUCTIVE PULMONARY DISEASE, UNSPECIFIED COPD TYPE (H): ICD-10-CM

## 2018-12-06 RX ORDER — ALENDRONATE SODIUM 70 MG/1
70 TABLET ORAL
Qty: 12 TABLET | Refills: 3 | Status: SHIPPED | OUTPATIENT
Start: 2018-12-06 | End: 2020-01-17

## 2018-12-06 RX ORDER — ALBUTEROL SULFATE 90 UG/1
2 AEROSOL, METERED RESPIRATORY (INHALATION) EVERY 6 HOURS PRN
Qty: 1 INHALER | Refills: 3 | Status: SHIPPED | OUTPATIENT
Start: 2018-12-06 | End: 2019-01-07

## 2018-12-11 ENCOUNTER — TELEPHONE (OUTPATIENT)
Dept: FAMILY MEDICINE | Facility: CLINIC | Age: 73
End: 2018-12-11

## 2018-12-11 NOTE — TELEPHONE ENCOUNTER
Plan does not cover this medication (Alendronate), do you want to change medication or do PA? Please advise. [phone] Patient ID # [id number]

## 2018-12-14 NOTE — TELEPHONE ENCOUNTER
Call pharmacy for alternative and they said the Alendronate went through, but patient need to pay 84 cents.  Call patient, but no answer, call the daughter and she will  today.  (No need for PA)

## 2019-01-07 ENCOUNTER — OFFICE VISIT (OUTPATIENT)
Dept: FAMILY MEDICINE | Facility: CLINIC | Age: 74
End: 2019-01-07
Payer: MEDICARE

## 2019-01-07 VITALS
DIASTOLIC BLOOD PRESSURE: 75 MMHG | WEIGHT: 97.6 LBS | RESPIRATION RATE: 16 BRPM | BODY MASS INDEX: 17.8 KG/M2 | HEART RATE: 86 BPM | OXYGEN SATURATION: 96 % | TEMPERATURE: 98.2 F | SYSTOLIC BLOOD PRESSURE: 117 MMHG

## 2019-01-07 DIAGNOSIS — J44.9 CHRONIC OBSTRUCTIVE PULMONARY DISEASE, UNSPECIFIED COPD TYPE (H): ICD-10-CM

## 2019-01-07 DIAGNOSIS — R51.9 MIXED HEADACHE: Primary | ICD-10-CM

## 2019-01-07 DIAGNOSIS — M25.561 PAIN IN BOTH KNEES, UNSPECIFIED CHRONICITY: ICD-10-CM

## 2019-01-07 DIAGNOSIS — R42 DIZZINESS: ICD-10-CM

## 2019-01-07 DIAGNOSIS — J30.2 SEASONAL ALLERGIC RHINITIS, UNSPECIFIED TRIGGER: ICD-10-CM

## 2019-01-07 DIAGNOSIS — M25.562 PAIN IN BOTH KNEES, UNSPECIFIED CHRONICITY: ICD-10-CM

## 2019-01-07 DIAGNOSIS — Z23 INFLUENZA VACCINE NEEDED: ICD-10-CM

## 2019-01-07 RX ORDER — FLUTICASONE PROPIONATE 50 MCG
1-2 SPRAY, SUSPENSION (ML) NASAL DAILY
Qty: 1 BOTTLE | Refills: 11 | Status: SHIPPED | OUTPATIENT
Start: 2019-01-07 | End: 2020-03-09

## 2019-01-07 RX ORDER — FLUTICASONE PROPIONATE 220 UG/1
2 AEROSOL, METERED RESPIRATORY (INHALATION) 2 TIMES DAILY
Qty: 3 INHALER | Refills: 1 | Status: SHIPPED | OUTPATIENT
Start: 2019-01-07 | End: 2019-07-27

## 2019-01-07 RX ORDER — ACETAMINOPHEN 325 MG/1
TABLET ORAL
Qty: 100 TABLET | Refills: 0 | Status: SHIPPED | OUTPATIENT
Start: 2019-01-07 | End: 2019-05-31

## 2019-01-07 RX ORDER — MECLIZINE HYDROCHLORIDE 25 MG/1
25 TABLET ORAL
COMMUNITY
Start: 2018-12-13 | End: 2019-07-23

## 2019-01-07 RX ORDER — ALBUTEROL SULFATE 90 UG/1
2 AEROSOL, METERED RESPIRATORY (INHALATION) EVERY 6 HOURS PRN
Qty: 1 INHALER | Refills: 3 | Status: SHIPPED | OUTPATIENT
Start: 2019-01-07 | End: 2019-11-06

## 2019-01-07 RX ORDER — ONDANSETRON 4 MG/1
4 TABLET, FILM COATED ORAL
COMMUNITY
Start: 2018-12-13 | End: 2019-05-31

## 2019-01-07 NOTE — PROGRESS NOTES
Preceptor Attestation:   Patient seen, evaluated and discussed with the resident. I have verified the content of the note, which accurately reflects my assessment of the patient and the plan of care.   Supervising Physician:  Neftali Lind MD

## 2019-01-07 NOTE — NURSING NOTE
Due to patient being non-English speaking/uses sign language, an  was used for this visit. Only for face-to-face interpretation by an external agency, date and length of interpretation can be found on the scanned worksheet.     name: Yamini De La Fuente Mai  Agency: [employer]  Language: Greenlandic   Telephone number: [phone]   Type of interpretation: Face-to-face, spoken    Injectable influenza vaccine documentation    1. Has the patient received the information for the influenza vaccine? YES    2. Does the patient have a severe allergy to eggs (Patients with a severe egg allergy should be assessed by a medical provider, RN, or clinical pharmacist. If they receive the influenza vaccine, please have them observed for 15 minutes.)? No    3. Has the patient had an allergic reaction to previous influenza vaccines? No    4. Has the patient had any severe allergic reactions to past influenza vaccines ? No       5. Does patient have a history of Guillain-[place] syndrome? No      Based on responses above, I administered the influenza vaccine.  Grace Hinds

## 2019-01-07 NOTE — PATIENT INSTRUCTIONS
1. Mixed headache  - continue tylenol, ibuprofen as needed  - keep track of how many days per week you take tylenol/ibuprofen/advil for head pressure and bring to next visit    2. Dizziness  - continue meclizine, zofran as needed  - OPTOMETRY REFERRAL  - OCCUPATIONAL THERAPY REFERRAL; Future    3. Pain in both knees, unspecified chronicity  - acetaminophen (TYLENOL) 325 MG tablet; One to two as needed for knee pain  Dispense: 100 tablet; Refill: 0  - PHYSICAL THERAPY REFERRAL; Future  - order for DME; Equipment being ordered: 4 wheeled walker with seat  Dispense: 1 Units; Refill: 0    4. Influenza vaccine needed  - ADMIN VACCINE, INITIAL  - FLU VACCINE, INCREASED ANTIGEN, PRESV FREE    5. Chronic obstructive pulmonary disease, unspecified COPD type (H)  - fluticasone (FLOVENT HFA) 220 MCG/ACT inhaler; Inhale 2 puffs into the lungs 2 times daily  Dispense: 3 Inhaler; Refill: 1  - albuterol (PROAIR HFA/PROVENTIL HFA/VENTOLIN HFA) 108 (90 Base) MCG/ACT inhaler; Inhale 2 puffs into the lungs every 6 hours as needed for shortness of breath / dyspnea or wheezing  Dispense: 1 Inhaler; Refill: 3    6. Seasonal allergic rhinitis, unspecified trigger  - fluticasone (FLONASE) 50 MCG/ACT nasal spray; Spray 1-2 sprays into both nostrils daily  Dispense: 1 Bottle; Refill: 11    [facility]  [address], [place]   PHONE: [phone]    [facility]  [address], [place]   PHONE: [phone]    [facility]  [address], [place]  (across from [facility])  PHONE: [phone]    [facility]  Multiple locations in the [facility]  Main phone line: [phone]  Fax: [phone]    Follow-up in 2 months or sooner if worsening pain, dizziness or any other worrisome changes    OPTOMETRY REFERRAL  January 7, 2019 at 11:32 am Called [employer] for a [place]ese  ID #[id number] - left a message on voicemail to call back. [facility]  January 8,  2019 at 2:33 pm Called [employer] for a [place]  ID #[id number] - left a message on voicemail to call back. [facility]  January 9, 2019 at 9:19 am letter sent. [facility]    PHYSICAL THERAPY REFERRAL & OCCUPATIONAL THERAPY REFERRAL   January 7, 2019 at 2:19 pm Demographics and referral for Physical Therapy faxed to [facility] at [phone] who will contact patient to schedule. [facility]  Phone: [phone]  Fax: [phone]

## 2019-01-07 NOTE — PROGRESS NOTES
ASSESSMENT AND PLAN     1. Mixed headache  Unclear if this is tension HA vs atypical migraine vs some other pathology. Negative MR brain COW w/ and w/o is reassuring  - continue tylenol, ibuprofen as needed  - keep track of how many days per week you take tylenol/ibuprofen/advil for head pressure and bring to next visit -- may need prophylaxis    2. Dizziness  Likely multifactorial.  Some component of visual disturbance, has not seen eye doctor for over 3 years and is wearing an old eyeglass prescription.  Atypical migraines may be contributing.  May have some component of BPPV.  Cannot exclude mild dehydration or orthostasis.  She was confused about the medications she was given from the emergency room, has been taking Zofran for dizziness.  We discussed proper use of medications and that she should keep track of how often she is using them.  - continue meclizine, zofran as needed  - OPTOMETRY REFERRAL  - OCCUPATIONAL THERAPY REFERRAL; Future    3. Pain in both knees, unspecified chronicity  Known osteoarthritis of the knees bilaterally.  Sustained a fall on the knees about 3 weeks ago.  No signs of acute fracture on exam.  She is requesting a 4 wheeled walker with seat which I think is appropriate given her osteoarthritis as well as her dizziness, in the setting of recent fall.  I do think she would also benefit from physical therapy for general strengthening, which she is open to.  - acetaminophen (TYLENOL) 325 MG tablet; One to two as needed for knee pain  Dispense: 100 tablet; Refill: 0  - PHYSICAL THERAPY REFERRAL; Future  - order for DME; Equipment being ordered: 4 wheeled walker with seat  Dispense: 1 Units; Refill: 0    4. Influenza vaccine needed  - ADMIN VACCINE, INITIAL  - FLU VACCINE, INCREASED ANTIGEN, PRESV FREE    5. Chronic obstructive pulmonary disease, unspecified COPD type (H)  Refills requested  - fluticasone (FLOVENT HFA) 220 MCG/ACT inhaler; Inhale 2 puffs into the lungs 2 times daily   Dispense: 3 Inhaler; Refill: 1  - albuterol (PROAIR HFA/PROVENTIL HFA/VENTOLIN HFA) 108 (90 Base) MCG/ACT inhaler; Inhale 2 puffs into the lungs every 6 hours as needed for shortness of breath / dyspnea or wheezing  Dispense: 1 Inhaler; Refill: 3    6. Seasonal allergic rhinitis, unspecified trigger  Refill requested  - fluticasone (FLONASE) 50 MCG/ACT nasal spray; Spray 1-2 sprays into both nostrils daily  Dispense: 1 Bottle; Refill: 11    Follow-up in 2 months or sooner if worsening pain, dizziness or any other worrisome changes    The patient was seen by, and discussed with, Dr. Lind who agrees with the plan.    Jen Conroy MD  PGY-3  Pager # [phone]         SUBJECTIVE       Lara Margot is a 73 year old  female with a PMH significant for:     Patient Active Problem List   Diagnosis     Sebaceous cyst     Edema of larynx     Eczema     Tear film insufficiency     Disease of lung     Chronic rhinitis     Restrictive lung disease     Osteoporosis without pathological fracture     Near syncope     She presents with ER follow-up.    Patient was seen at the [facility] emergency department on 12/13/2018 for nausea and dizziness.  She had BMP, CMP, lipase, lactic acid, magnesium, troponin, CBC, urinalysis.  Slight elevation of total bilirubin to 1.3, labs are otherwise unremarkable.  EKG without acute changes.  Initial head CT without contrast unremarkable.  Chest x-ray showing redemonstration of opacities in the right middle lobe and lingula, which have been seen on previous x-rays and event followed up by pulmonology.  MR brain cow with and without contrast with no acute pathology, aneurysm, occlusion, stenosis or vascular lesion.  Patient was given fluids, Zofran and meclizine with improvement of her symptoms.  She was discharged with prescriptions for Zofran and meclizine.    Patient states she has never had these symptoms prior to her ED visit.  She had head trauma as a child but no recent head  trauma.  Notes continued dizziness and head pressure since the emergency room visit.  She gets pressure on the right side of her head that is constant.  She sometimes gets blurred vision with this.  Gets mild nausea but no vomiting.  No photophobia or phonophobia.  No focal numbness, weakness or tingling.  This seems to get somewhat better with Advil, takes about twice weekly.  She does continue to get dizziness which she cannot further explain.  This does not always occur with the pressure in her head.  She denies any tinnitus or other hearing changes.  She has not had any emesis since her ER visit.  States she has been using the medicines that the ER gave her however it appears she has been taking Zofran for the dizziness and she is not sure how often she takes Zofran or meclizine.  She is walking with a cane but states she use this prior to her ER visit.  She does report a fall at home a couple days after her ER visit.  At first she stated this was because of weakness but with further discussion through the use of an , patient reports she has had pain in her knees for many years and feels that her right leg is shorter than her left leg and this is what caused her fall.  She fell on her knees, did not hit her head.  She does have some scabs but otherwise no significant change in pain in her knee since that time.    PMH, Medications and Allergies were reviewed and updated as needed.        REVIEW OF SYSTEMS     Per HPI        OBJECTIVE     Vitals:    01/07/19 0905   BP: 117/75   Pulse: 86   Resp: 16   Temp: 98.2  F (36.8  C)   TempSrc: Oral   SpO2: 96%   Weight: 44.3 kg (97 lb 9.6 oz)     Body mass index is 17.8 kg/m .    Constitutional: Awake, alert, cooperative, no apparent distress, and appears stated age.  Eyes: wearing glasses, PERRL, EOMI, no nystagmus  HEAD: NCAT, temples non tender  ENT: TMs are clear bilaterally, moist mucous membranes, tonsils are 1+ without erythema or exudates  Neck: Supple,  symmetrical, trachea midline, no adenopathy.  Lungs: No increased work of breathing, good air exchange, clear to auscultation bilaterally, no crackles or wheezing.  Cardiovascular: Regular rate and rhythm, normal S1 and S2, no S3 or S4, and no murmur noted.  Musculoskeletal: Full range of motion of knees bilaterally without pain or restriction.  Knee joints are not tender to palpation, no swelling or erythema noted.  Neurologic: Awake, alert.  Memory of ER visit is intact.  Cranial nerves II through XII are grossly intact, no pronator drift, finger nose finger intact.  Slightly antalgic gait, using cane.  Neuropsychiatric: Normal affect, mood  Skin: 3 well-healed scabs on right knee without surrounding erythema or drainage    No results found for this or any previous visit (from the past 24 hour(s)).

## 2019-01-21 ENCOUNTER — MEDICAL CORRESPONDENCE (OUTPATIENT)
Dept: HEALTH INFORMATION MANAGEMENT | Facility: CLINIC | Age: 74
End: 2019-01-21

## 2019-05-31 ENCOUNTER — OFFICE VISIT (OUTPATIENT)
Dept: FAMILY MEDICINE | Facility: CLINIC | Age: 74
End: 2019-05-31
Payer: MEDICARE

## 2019-05-31 VITALS
RESPIRATION RATE: 16 BRPM | OXYGEN SATURATION: 97 % | HEART RATE: 78 BPM | DIASTOLIC BLOOD PRESSURE: 75 MMHG | TEMPERATURE: 97.7 F | SYSTOLIC BLOOD PRESSURE: 115 MMHG | BODY MASS INDEX: 19.03 KG/M2 | HEIGHT: 62 IN | WEIGHT: 103.4 LBS

## 2019-05-31 DIAGNOSIS — S05.02XA ABRASION OF LEFT CORNEA, INITIAL ENCOUNTER: Primary | ICD-10-CM

## 2019-05-31 RX ORDER — SULFACETAMIDE SODIUM 100 MG/ML
1-2 SOLUTION/ DROPS OPHTHALMIC
Qty: 10 ML | Refills: 0 | Status: SHIPPED | OUTPATIENT
Start: 2019-05-31 | End: 2019-06-26

## 2019-05-31 ASSESSMENT — MIFFLIN-ST. JEOR: SCORE: 934.27

## 2019-05-31 NOTE — PROGRESS NOTES
"[facility] Medicine Clinic Visit    Subjective:  Lara Frost is a 74 year old female with a PMHx significant for   Patient Active Problem List   Diagnosis     Sebaceous cyst     Edema of larynx     Eczema     Tear film insufficiency     Disease of lung     Chronic rhinitis     Restrictive lung disease     Osteoporosis without pathological fracture     Near syncope    who presents with left eye redness.     Patient complains of left eye redness for the past 2 days. She feels like there's sand in her eye and it's irritated, but not very painful. No pain or pressure in eye or pain with movement. No blurry vision or change to vision. No visual field defects. Normal eye movements. No significant eye discharge but is watery sometimes. Not itchy either. Does not use contacts or put anything in/near eye. Does wear glasses. She's never had anything like this before. Had seen Ophthalmology a few years ago for dry eyes. Otherwise, no fevers, headache, hearing problems, chest pain, trouble breathing, focal neuro deficits, rash.     Preventative care: due for annual visit. She will return soon.     Objective:  Vitals:    05/31/19 0827   BP: 115/75   Pulse: 78   Resp: 16   Temp: 97.7  F (36.5  C)   TempSrc: Oral   SpO2: 97%   Weight: 46.9 kg (103 lb 6.4 oz)   Height: 1.578 m (5' 2.13\")     Body mass index is 18.84 kg/m .    GEN: NAD, healthy, alert  EYES: left eye with deep erythematous subconjunctival hemorrhage on medial aspect, PERRL, EOMI, normal conjunctivae/sclerae of left eye, no foreign body noted  FLUORESCEIN TEST: 3-4mm horizontal linear superficial corneal abrasion on inferior aspect of left eye, no deep involvement extending deep into cornea  HENT: nose & mouth w/o ulcers or lesions, clear oropharynx, MMM  NECK: no LAD  RESP: CTAB, no w/r/r  CV: RRR, nl S1/S2, no mrg  SKIN: no suspicious lesions or rashes noted on face  PSYCH: mentation appears normal, affect normal/bright    Assessment/Plan:  Lara was seen today " for eye problem.    Diagnoses and all orders for this visit:    Abrasion of left cornea, initial encounter  Two days of left eye redness with gritty foreign body sensation, found to have superficial corneal abrasion under fluorescein exam. Will treat with antibiotic drops for 1 week and refer to Ophtho for follow up. Reassuring that patient has no pain, discharge, vision changes or neuro deficits.   -     sulfacetamide (BLEPH-10) 10 % ophthalmic solution; Place 1-2 drops Into the left eye every 2 hours (while awake)  -     OPHTHALMOLOGY ADULT REFERRAL; Future    Follow up for annual visit in the next month.     Options for treatment and follow-up care were reviewed with the patient who was engaged and actively involved in the decision making process, verbalized understanding of the options discussed, and satisfied with the final plan.    Patient was staffed with supervising physician, Dr. Bennett.     Gibran Coronado MD, PGY-2  [facility]

## 2019-05-31 NOTE — PROGRESS NOTES
Preceptor Attestation:   Patient seen, evaluated and discussed with the resident. I have verified the content of the note, which accurately reflects my assessment of the patient and the plan of care.   Supervising Physician:  Lorrie Benntet MD

## 2019-06-25 ENCOUNTER — OFFICE VISIT (OUTPATIENT)
Dept: FAMILY MEDICINE | Facility: CLINIC | Age: 74
End: 2019-06-25
Payer: MEDICARE

## 2019-06-25 ENCOUNTER — RECORDS - HEALTHEAST (OUTPATIENT)
Dept: ADMINISTRATIVE | Facility: OTHER | Age: 74
End: 2019-06-25

## 2019-06-25 VITALS — SYSTOLIC BLOOD PRESSURE: 136 MMHG | DIASTOLIC BLOOD PRESSURE: 75 MMHG | HEART RATE: 88 BPM | RESPIRATION RATE: 18 BRPM

## 2019-06-25 VITALS
OXYGEN SATURATION: 96 % | DIASTOLIC BLOOD PRESSURE: 69 MMHG | HEART RATE: 73 BPM | RESPIRATION RATE: 20 BRPM | TEMPERATURE: 98.7 F | WEIGHT: 101.2 LBS | BODY MASS INDEX: 18.62 KG/M2 | HEIGHT: 62 IN | SYSTOLIC BLOOD PRESSURE: 132 MMHG
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
## 2019-06-25 DIAGNOSIS — R41.3 MEMORY LOSS: ICD-10-CM

## 2019-06-25 DIAGNOSIS — Z00.00 ENCOUNTER FOR MEDICARE ANNUAL WELLNESS EXAM: Primary | ICD-10-CM

## 2019-06-25 LAB
CHOLEST SERPL-MCNC: 226.5 MG/DL (ref 0–200)
CHOLEST/HDLC SERPL: 3.6 {RATIO} (ref 0–5)
HCV AB SER QL: NEGATIVE
HDLC SERPL-MCNC: 62.3 MG/DL
HIV 1+2 AB+HIV1 P24 AG SERPL QL IA: NEGATIVE
LDLC SERPL CALC-MCNC: 140 MG/DL (ref 0–129)
TRIGL SERPL-MCNC: 122.8 MG/DL (ref 0–150)
VLDL CHOLESTEROL: 24.6 MG/DL (ref 7–32)

## 2019-06-25 ASSESSMENT — ASTHMA QUESTIONNAIRES
ACT_TOTALSCORE: 13
QUESTION_2 LAST FOUR WEEKS HOW OFTEN HAVE YOU HAD SHORTNESS OF BREATH: ONCE A DAY
QUESTION_4 LAST FOUR WEEKS HOW OFTEN HAVE YOU USED YOUR RESCUE INHALER OR NEBULIZER MEDICATION (SUCH AS ALBUTEROL): NOT AT ALL
QUESTION_5 LAST FOUR WEEKS HOW WOULD YOU RATE YOUR ASTHMA CONTROL: SOMEWHAT CONTROLLED
ACUTE_EXACERBATION_TODAY: NO
QUESTION_3 LAST FOUR WEEKS HOW OFTEN DID YOUR ASTHMA SYMPTOMS (WHEEZING, COUGHING, SHORTNESS OF BREATH, CHEST TIGHTNESS OR PAIN) WAKE YOU UP AT NIGHT OR EARLIER THAN USUAL IN THE MORNING: FOUR OR MORE NIGHTS A WEEK
QUESTION_1 LAST FOUR WEEKS HOW MUCH OF THE TIME DID YOUR ASTHMA KEEP YOU FROM GETTING AS MUCH DONE AT WORK, SCHOOL OR AT HOME: MOST OF THE TIME

## 2019-06-25 ASSESSMENT — ACTIVITIES OF DAILY LIVING (ADL)
RETIRED_COMMUNICATION: 0-->UNDERSTANDS/COMMUNICATES WITHOUT DIFFICULTY
BATHING: 2-->ASSISTIVE PERSON
RETIRED_EATING: 0-->INDEPENDENT
FALL_HISTORY_WITHIN_LAST_SIX_MONTHS: NO
TOILETING: 0-->INDEPENDENT
AMBULATION: 3-->ASSISTIVE EQUIPMENT AND PERSON
SWALLOWING: 0-->SWALLOWS FOODS/LIQUIDS WITHOUT DIFFICULTY
TRANSFERRING: 1-->ASSISTIVE EQUIPMENT
DRESS: 0-->INDEPENDENT
COGNITION: 1 - ATTENTION OR MEMORY DEFICITS

## 2019-06-25 ASSESSMENT — ANXIETY QUESTIONNAIRES
GAD7 TOTAL SCORE: 0
7. FEELING AFRAID AS IF SOMETHING AWFUL MIGHT HAPPEN: NOT AT ALL
5. BEING SO RESTLESS THAT IT IS HARD TO SIT STILL: NOT AT ALL
2. NOT BEING ABLE TO STOP OR CONTROL WORRYING: NOT AT ALL
IF YOU CHECKED OFF ANY PROBLEMS ON THIS QUESTIONNAIRE, HOW DIFFICULT HAVE THESE PROBLEMS MADE IT FOR YOU TO DO YOUR WORK, TAKE CARE OF THINGS AT HOME, OR GET ALONG WITH OTHER PEOPLE: NOT DIFFICULT AT ALL
3. WORRYING TOO MUCH ABOUT DIFFERENT THINGS: NOT AT ALL
1. FEELING NERVOUS, ANXIOUS, OR ON EDGE: NOT AT ALL
6. BECOMING EASILY ANNOYED OR IRRITABLE: NOT AT ALL

## 2019-06-25 ASSESSMENT — PATIENT HEALTH QUESTIONNAIRE - PHQ9
5. POOR APPETITE OR OVEREATING: NOT AT ALL
SUM OF ALL RESPONSES TO PHQ QUESTIONS 1-9: 0

## 2019-06-25 ASSESSMENT — MIFFLIN-ST. JEOR: SCORE: 925.54

## 2019-06-25 ASSESSMENT — PAIN SCALES - GENERAL: PAINLEVEL: NO PAIN (0)

## 2019-06-25 NOTE — NURSING NOTE
DEXA SCAN  June 25, 2019 faxed order and demographics to [facility] Sched[facility] at [phone] who will contact patient to assist.   Patrice George CMA    FIT TEST  June 25, 2019 FIT TEST was ordered and given to patient.     MAMMOGRAM  June 25, 2019 faxed order and demographics to [facility] Scheduling at [phone] who will contact patient to assist.     Patrice George CMA

## 2019-06-25 NOTE — PROGRESS NOTES
65+ Annual Wellness Visit         HPI     This 74 year old female presents as an established patient  Carlos A Bates who presents for a Initial Medicare Wellness Visit    Other issues patient wants to be addressed today:    Chief Complaint   Patient presents with     Physical     65+ Wellness Visit        Patient Active Problem List   Diagnosis     Sebaceous cyst     Edema of larynx     Eczema     Tear film insufficiency     Disease of lung     Chronic rhinitis     Restrictive lung disease     Osteoporosis without pathological fracture     Near syncope       Past Medical History:   Diagnosis Date     Uncomplicated asthma         Family History   Problem Relation Age of Onset     No Known Problems Mother      No Known Problems Father      No Known Problems Maternal Grandmother      No Known Problems Maternal Grandfather      No Known Problems Paternal Grandmother      No Known Problems Paternal Grandfather      No Known Problems Brother      No Known Problems Sister      No Known Problems Son      No Known Problems Daughter      No Known Problems Maternal Half-Brother      No Known Problems Maternal Half-Sister      No Known Problems Paternal Half-Brother      No Known Problems Paternal Half-Sister      No Known Problems Niece      No Known Problems Nephew      No Known Problems Cousin      No Known Problems Other      Diabetes No family hx of      Coronary Artery Disease No family hx of      Hypertension No family hx of      Hyperlipidemia No family hx of      Breast Cancer No family hx of      Cancer - colorectal No family hx of      Ovarian Cancer No family hx of      Prostate Cancer No family hx of      Other Cancer No family hx of      Mental Illness No family hx of      Cerebrovascular Disease No family hx of      Anesthesia Reaction No family hx of      Asthma No family hx of      Osteoporosis No family hx of      Known Genetic Syndrome No family hx of      Obesity No family hx of      Unknown/Adopted No  family hx of      Colon Cancer No family hx of      Depression No family hx of      Anxiety Disorder No family hx of      Mental Illness No family hx of      Substance Abuse No family hx of      Genetic Disorder No family hx of      Thyroid Disease No family hx of          Problem List, Family History and past Medical History reviewed and unchanged/updated.    Nursing Notes:   Patrice George CMA  6/25/2019  9:27 AM  Signed  Chief Complaint   Patient presents with     Physical     65+ Wellness Visit      Patrice George CMA    Due to patient being non-English speaking/uses sign language, an  was used for this visit. Only for face-to-face interpretation by an external agency, date and length of interpretation can be found on the scanned worksheet.     name: Ekaterina Bardales  Agency: [employer]  Language: E[place]rean   Telephone number: [phone]  Type of interpretation: Group, spoken; number of participants: 2     RHONDA Limon Yosvany, CMA  6/25/2019 10:26 AM  Addendum  VISION   Wears glasses: worn for testing  Tool used: HOTV   Right eye:        10/20 (20/40)  Left eye:          10/20 (20/40)  Visual Acuity: REFER. Patient had an Eye Exam Done 2 weeks ago     Medicare Wellness Visit  Health Risk Assessment        Visual Acuity:  Right Eye: 10/20   Left Eye: 10/20  Both Eyes: 10/20 WITH GLASSES         FALL RISK ASSESSMENT 6/25/2019 8/30/2018   Fallen 2 or more times in the past year? No No   Any fall with injury in the past year? No No            Health Risk Assessment / Review of Systems     Constitutional: Any fevers or night sweats? No     Eyes:  Vision problems   No     Hearing Do you feel you have hearing loss?  No     Cardiovascular: Any chest pain, fast or irregular heart beat, calf pain with walking?      YES           Respiratory:   Any breathing problems or cough?    YES     Gastrointestinal: Any stomach or stool problems?   No       Genitourinary: Do you have difficulty controlling urination?    YES, INCONTINENCE     Muscles and Joints: Any joint stiffness or soreness?    YES, joint pain and soreness    Skin: Any concerning lesions or moles?    YES    Nervous System: Any loss of strength or feeling, numbness or tingling, shaking, dizziness, or headache?   YES, having Headaches and Dizziness.    Mental Health: Any depression, anxiety or problems sleeping?     YES, Sleeping problems.    Cognition: Do you have any problems with your memory?  No     PHQ-2 Score:   PHQ-2 ( 1999 Pfizer) 6/25/2019 5/31/2019   Q1: Little interest or pleasure in doing things 0 0   Q2: Feeling down, depressed or hopeless 0 0   PHQ-2 Score 0 0       PHQ-9 Score:   [facility] Follow-up to PHQ 6/25/2019   PHQ-9 9. Suicide Ideation past 2 weeks Not at all            Medical Care     What other specialists or organizations are involved in your medical care?  None  Patient Care Team       Relationship Specialty Notifications Start End    Carlos A Bates MD PCP - General Family Practice  1/3/13     Phone: [phone] Fax: [phone]         [address] [place] [address]                 Social History / Home Safety     Social History     Tobacco Use     Smoking status: Never Smoker     Smokeless tobacco: Never Used   Substance Use Topics     Alcohol use: No     Marital Status:  Who lives in your household?. Patient did not answer.     Does your home have any of the following safety concerns? Loose rugs in the hallway, no grab bars in the bathroom, no handrails on the stairs or have poorly lit areas?  No     Do you feel threatened or controlled by a partner, ex-partner or anyone in your life? No     Has anyone hurt you physically, for example by pushing, hitting, slapping or kicking you   or forcing you to have sex? No          Functional Status     Do you need help with dressing yourself, bathing, or walking? YES, Dressing and Taking baths     Do you need help with the  "phone, transportation, shopping, preparing meals, housework, laundry, medications or managing money? YES, All of them.      Risk Behaviors and Healthy Habits     History   Smoking Status     Never Smoker   Smokeless Tobacco     Never Used     How many servings of fruits and vegetables do you eat a day? 1     Exercise: None No exercise     Do you frequently drive without a seatbelt? No. Patient do not drive at all.     Do you use any other drugs? No         Do you use alcohol?No      Frailty Assessment            1. By yourself and note using aids, do you have difficulty walking up 10 steps without resting?   YES, I need to use a walker (1 for Yes, 0 for No)    2. By yourself and not using mobility aids, do you have any difficulty walking several hundred yards?  YES  (1 for Yes, 0 for No)    3. Have you lost 10 or more pounds unintentionally in the previous year? No  (If \"Yes\" and >5% weight loss, then score 1.  Score 0, if <5% weight loss or \"No\" weight loss)    4. How much of the times during the past 3 weeks did you feel tired? 2. Most of the time (\"1\" or \"2\" are scored 1, others 0)    5.  A doctor told the patient they had the following illnesses:  Asthma (0-4 = score 0, 5-11= score 1)      RHONDA Limon Yosvany, CMA  6/25/2019 10:30 AM  Signed  DEXA SCAN  June 25, 2019 faxed order and demographics to [facility] Scheduling at [phone] who will contact patient to assist.   Patrice George CMA    FIT TEST  June 25, 2019 FIT TEST was ordered and given to patient.     MAMMOGRAM  June 25, 2019 faxed order and demographics to [facility] Scheduling at [phone] who will contact patient to assist.     Patrice George CMA        Frailty Assessment            1. (1 for Yes, 0 for No)    2. (1 for Yes, 0 for No)    3. (If \"Yes\" and >5% weight loss, then score 1.  Score 0, if <5% weight loss or \"No\" weight loss)    4. (\"1\" or \"2\" are scored 1, others " 0)    5. Count number of illnesses. (0-4 = score 0, 5-11= score 1)    Total score: 3.  Has good support from family at home.  Daughter helps patient with all ADLs and some IADLs.  We discussed a referral for PT and OT for further strength evaluation and recommendations for home safety however patient states after completing PT in the last year or so, she received a bill for over $1000 and is still working to resolve this.  She would prefer to wait for a new referral until this has been resolved.    EVALUATION OF COGNITIVE FUNCTION     Mood/affect:Normal  Appearance:Normal  Family member/caregiver input: son present. Notes patient has some short term memory loss. No wandering, leaving stoves on, etc. Patient also notes noticing some short term memory loss in herself    Mini Cog Scoring 2   points   Clock Draw Test result: clock shape and numbers are correct. Hour hand correct. Minute hand incorrect.   Cognitive screen is: Mildly positive however in the setting of language barrier, I think patient did quite well.  Has reportedly had some mild short-term memory loss however has good support at home and seems to be safe at home at this time.      Other Assessments     CV Risk based on Pooled Cohort Risk (consider assessing every 4-6 years; consider statin in patients with 10-yr risk > 7.5%):   The 10-year ASCVD risk score (Burlington DC Jr., et al., 2013) is: 12.6%    Values used to calculate the score:      Age: 74 years      Sex: Female      Is Non- : No      Diabetic: No      Tobacco smoker: No      Systolic Blood Pressure: 132 mmHg      Is BP treated: No      HDL Cholesterol: 62.3 mg/dL      Total Cholesterol: 226.5 mg/dL    Advance Directives: Discussed with patient and family as appropriate.  Has patient completed advance directives and/or a living will?  Does not have. Discussed purpose of forms. Interested in filling this out.  Discussed with patient and her son, who is English speaking.  Given  "honoring choices packet and short POLST form    Immunization History   Administered Date(s) Administered     Flu, Unspecified 08/23/2012, 10/22/2014     HepB, Unspecified 09/15/2000     HepB-Adult 06/25/2019     Influenza (High Dose) 3 valent vaccine 10/25/2016, 10/09/2017, 01/07/2019     Influenza (IIV3) PF 08/23/2012, 10/16/2013     Influenza Vaccine IM 3yrs+ 4 Valent IIV4 10/22/2014     Influenza Vaccine, 3 YRS +, IM (QUADRIVALENT W/PRESERVATIVES) 10/22/2014     Pneumo Conj 13-V (2010&after) 04/23/2013, 06/25/2019     Pneumococcal 23 valent 04/23/2013     TD (ADULT, 7+) 05/17/2017     TDAP Vaccine (Adacel) 10/25/2012     Td (Adult), Adsorbed 07/21/2000, 05/17/2017     Tdap (Adacel,Boostrix) 10/25/2012     Reviewed Immunization Record Today         Physical Exam     Vitals: /69   Pulse 73   Temp 98.7  F (37.1  C) (Oral)   Resp 20   Ht 1.58 m (5' 2.21\")   Wt 45.9 kg (101 lb 3.2 oz)   SpO2 96%   Breastfeeding? No   BMI 18.39 kg/m    BMI= Body mass index is 18.39 kg/m .  EXAM:  Constitutional: healthy, alert and no distress   Cardiovascular: RRR. No murmurs, clicks gallops or rub  Respiratory: Breathing comfortably on room air. CTAB  Psychiatric: mentation appears normal and affect normal/bright  Abdomen: Abdomen soft, non-tender. BS normal.   JOINT/EXTREMITIES: extremities normal- no gross deformities noted, gait normal and normal muscle tone and strength in BUE and BLE        Assessment and Plan     Reviewed Preventive Services and Plan form with patient as specified in Patient Instructions.    Positive findings on assessment:    1. Encounter for Medicare annual wellness exam  - Fecal Occult Blood - FIT, iFOB ([facility]); Future  - PCS Use: Screening Digital Bilateral Mammogram; Future  - SCREENING, VISUAL ACUITY, QUANTITATIVE, BILAT  - Lipid Panel ([facility]) - Results < 1 hr  - Hepatitis C Antibody ([facility])  - HIV Ag/Ab Screen [place] ([facility])  - Dexa hip/pelvis/spine*; Future - T -2/6 in 2016. On " Fosamax. Due for recheck  - ADMIN VACCINE, INITIAL  - ADMIN VACCINE, EACH ADDITIONAL  - HEPATITIS B VACCINE,ADULT,IM  - Pneumococcal vaccine 13 valent PCV13 IM (Prevnar) [97554]  - Advised to speak with pharmacy re: zoster    2. Memory loss  Mild short term memory loss. Strong support at home. No wandering or other dangerous activities. Did have 1 mechanical fall in the last year. Would benefit from PT/OT however still trying to resolve >$1000 bill from PT from a couple years ago. Prefers to wait until this is resolved for new referral/assessment. Follow-up at next visit. Consider serum testing for other causes of memory loss if progressing (TSH, B12)    Follow-up in 4 weeks, sooner if worsening/worrisome symptoms    Options for treatment and follow-up care were reviewed with the Lara Margot and/or guardian engaged in the decision making process and verbalized understanding of the options discussed and agreed with the final plan.    The patient was seen by, and discussed with, Dr. Somers who agrees with the plan.    Jen Conroy MD

## 2019-06-25 NOTE — PROGRESS NOTES
".nursenote  {PROVIDER TO UPDATE PMH, PSH, FamHX, and SocialHX:316393::\"click delete button to remove this line now\"}  {PROVIDER TO CLICK \"Refresh all smart links\" icon - double arrows on R of tool bar:023000::\"click delete button to remove this line now\"}  {Optional Additional Exam - brief:773437}    End of Life Planning:   Patient currently has an advanced directive: { :086146}    Education/Counseling:   Based on review of the above information, the following items were addressed:  {IPPE Counselin}    Appropriate preventive services were discussed with this patient, including applicable screening as appropriate for cardiovascular disease, diabetes, osteopenia/osteoporosis, and glaucoma.  As appropriate for age/gender, discussed screening for colorectal cancer, prostate cancer, breast cancer, and cervical cancer.   Checklist reviewing preventive services available has been given to the patient.    {PROVIDER TO CONSIDER printing smart phrase \"piwelcometomedicareqic\" in AFTER VISIT SUMMARY for patient information :233543::\"click delete button to remove this line now\"}    {Please bill a \"NO CHARGE\" so that coders can attach the proper code.  If provider is adding E & M charge beyond above, be sure to add ROS and DIAGNOSIS/PLAN:745802::\"click delete button to remove this line now\"}    "

## 2019-06-25 NOTE — NURSING NOTE
VISION   Wears glasses: worn for testing  Tool used: HOTV   Right eye:        10/20 (20/40)  Left eye:          10/20 (20/40)  Visual Acuity: REFER. Patient had an Eye Exam Done 2 weeks ago     Medicare Wellness Visit  Health Risk Assessment        Visual Acuity:  Right Eye: 10/20   Left Eye: 10/20  Both Eyes: 10/20 WITH GLASSES         FALL RISK ASSESSMENT 6/25/2019 8/30/2018   Fallen 2 or more times in the past year? No No   Any fall with injury in the past year? No No            Health Risk Assessment / Review of Systems     Constitutional: Any fevers or night sweats? No     Eyes:  Vision problems   No     Hearing Do you feel you have hearing loss?  No     Cardiovascular: Any chest pain, fast or irregular heart beat, calf pain with walking?      YES           Respiratory:   Any breathing problems or cough?    YES     Gastrointestinal: Any stomach or stool problems?   No      Genitourinary: Do you have difficulty controlling urination?    YES, INCONTINENCE     Muscles and Joints: Any joint stiffness or soreness?    YES, joint pain and soreness    Skin: Any concerning lesions or moles?    YES    Nervous System: Any loss of strength or feeling, numbness or tingling, shaking, dizziness, or headache?   YES, having Headaches and Dizziness.    Mental Health: Any depression, anxiety or problems sleeping?     YES, Sleeping problems.    Cognition: Do you have any problems with your memory?  No     PHQ-2 Score:   PHQ-2 ( 1999 Pfizer) 6/25/2019 5/31/2019   Q1: Little interest or pleasure in doing things 0 0   Q2: Feeling down, depressed or hopeless 0 0   PHQ-2 Score 0 0       PHQ-9 Score:   [facility] Follow-up to PHQ 6/25/2019   PHQ-9 9. Suicide Ideation past 2 weeks Not at all            Medical Care     What other specialists or organizations are involved in your medical care?  None  Patient Care Team       Relationship Specialty Notifications Start End    Carlos A Bates MD PCP - General Family Practice  1/3/13     Phone:  [phone] Fax: [phone]         [address] [place] [address]                 Social History / Home Safety     Social History     Tobacco Use     Smoking status: Never Smoker     Smokeless tobacco: Never Used   Substance Use Topics     Alcohol use: No     Marital Status:  Who lives in your household?. Patient did not answer.     Does your home have any of the following safety concerns? Loose rugs in the hallway, no grab bars in the bathroom, no handrails on the stairs or have poorly lit areas?  No     Do you feel threatened or controlled by a partner, ex-partner or anyone in your life? No     Has anyone hurt you physically, for example by pushing, hitting, slapping or kicking you   or forcing you to have sex? No          Functional Status     Do you need help with dressing yourself, bathing, or walking? YES, Dressing and Taking baths     Do you need help with the phone, transportation, shopping, preparing meals, housework, laundry, medications or managing money? YES, All of them.      Risk Behaviors and Healthy Habits     History   Smoking Status     Never Smoker   Smokeless Tobacco     Never Used     How many servings of fruits and vegetables do you eat a day? 1     Exercise: None No exercise     Do you frequently drive without a seatbelt? No. Patient do not drive at all.     Do you use any other drugs? No         Do you use alcohol?No      Frailty Assessment            1. By yourself and note using aids, do you have difficulty walking up 10 steps without resting?   YES, I need to use a walker (1 for Yes, 0 for No)    2. By yourself and not using mobility aids, do you have any difficulty walking several hundred yards?  YES  (1 for Yes, 0 for No)    3. Have you lost 10 or more pounds unintentionally in the previous year? No  (If \"Yes\" and >5% weight loss, then score 1.  Score 0, if <5% weight loss or \"No\" weight loss)    4. How much of the times during the past 3 weeks did you feel tired? 2. Most of " "the time (\"1\" or \"2\" are scored 1, others 0)    5.  A doctor told the patient they had the following illnesses:  Asthma (0-4 = score 0, 5-11= score 1)      Patrice George, RHONDA  "

## 2019-06-25 NOTE — PROGRESS NOTES
ASSESSMENT AND PLAN     1. Rat bite, initial encounter  Per up to date, low risk for rabies therefore PEP not recommended. Bacterial prophylaxis is indicated, therefore augmentin prescribed today and encouraged to  ASAP  - amoxicillin-clavulanate (AUGMENTIN) 875-125 MG tablet; Take 1 tablet by mouth 2 times daily for 7 days  Dispense: 14 tablet; Refill: 0    Follow-up if worsening fevers, redness, swelling or any other worrisome symptoms    The patient was seen by, and discussed with, Dr. Somers who agrees with the plan.    Jen Conroy MD  PGY-3  Pager # [phone]         SUBJECTIVE       Lara Margot is a 74 year old  female with a PMH significant for:     Patient Active Problem List   Diagnosis     Sebaceous cyst     Edema of larynx     Eczema     Tear film insufficiency     Disease of lung     Chronic rhinitis     Restrictive lung disease     Osteoporosis without pathological fracture     Near syncope     She presents with rat bite.    Patient states that she was at home washing dishes when a rat ran across her foot and bit her on the left toe.  States it was attached for about 1 second and then ran away.  She had pain in the area and a small amount of bleeding which resolved with pressure.  She has not had worsening pain at the site.  She has not had fevers.  She feels very anxious about the situation.  Tells me that she lives with in a home with her daughter and they have had issues with rats, are currently setting traps and spraying in an attempt to catch/exterminate them.    PMH, Medications and Allergies were reviewed and updated as needed.        REVIEW OF SYSTEMS     Per HPI        OBJECTIVE     Vitals:    06/25/19 1527   BP: 136/75   Pulse: 88   Resp: 18     There is no height or weight on file to calculate BMI.    Constitutional: Awake, alert, cooperative, appears anxious but NAD  Lungs: No increased work of breathing, good air exchange, clear to auscultation bilaterally, no crackles or  wheezing.  Cardiovascular: Regular rate and rhythm, normal S1 and S2, no S3 or S4, and no murmur noted. Dp pulses 2+ and symmetric  Musculoskeletal: Full active ROM of toes and ankle of L foot  Neurologic: Sensation intact to light touch on L foot/toes  Skin: 0.3cm diameter lesion on medial L great toe. No bleeding or purulence or swelling.

## 2019-06-25 NOTE — NURSING NOTE
Chief Complaint   Patient presents with     Physical     65+ Wellness Visit      Patrice George CMA    Due to patient being non-English speaking/uses sign language, an  was used for this visit. Only for face-to-face interpretation by an external agency, date and length of interpretation can be found on the scanned worksheet.     name: Ekaterina Bardales  Agency: [employer]  Language: [place] Islander   Telephone number: [phone]  Type of interpretation: Group, spoken; number of participants: 2     Patrice George CMA

## 2019-06-25 NOTE — PROGRESS NOTES
Preceptor Attestation:   Patient seen, evaluated and discussed with the resident. I have verified the content of the note, which accurately reflects my assessment of the patient and the plan of care.   Supervising Physician:  Silverio Somers MD

## 2019-06-25 NOTE — NURSING NOTE
Due to patient being non-English speaking/uses sign language, an  was used for this visit. Only for face-to-face interpretation by an external agency, date and length of interpretation can be found on the scanned worksheet.     name: [id number] [facility]  Agency: [employer]  Language: Egyptian   Telephone number: bite    Type of interpretation: Telemedicine, spoken  [id number] Minh

## 2019-06-25 NOTE — PATIENT INSTRUCTIONS
Service Indications Recommend Completed   Vaccines    Pneumococcal     Once for patients >65 Given today      Influenza   Yearly, for all beneficiaries UTD      Hepatitis B (if medium/high risk) Medium/high risk factors:    ESRD    Hemophiliacs who received Factor XIII or IX concentrates    Clients of institutions for mentally handicapped    Persons who live in same house as a HepB carrier    Homosexual men    Illicit injectable drug users    Health care workers    Staff of institutions for mentally handicapped 1/3 done. Second given today    Mammogram One-time screen between age 35-39, annually for age >39 ordered    Pap and Pelvic Exam Annually if  high risk,  or childbearing age with abnormal Pap in last 3 years.  Q24 months for all other women UTD    Prostate Cancer Screening    Digital rectal exam    PSA Annually for all men > age 50 NA    Colorectal Cancer Screening  ordered    Diabetes self-management training Requires referral by treating physician for patient with diabetes NA    Diabetes screening tests    Fasting blood sugar or glucose tolerance test Patient must be diagnosed with one of the following:    Hypertension    Dyslipidemia    Obesity (BMI ? 30 kg/m )    Previous impaired fasting glucose or impaired glucose tolerance             . . . or any two of the following:    Overweight (BMI ? 25, < 30)    Family history of diabetes    Age 65 years or older    History of gestational diabetes, or birth to baby weighing > 9 lbs. NA    Cardiovascular screening blood tests    Total cholesterol    HDL    Triglycerides All asymptomatic beneficiaries every 5 years ordered    Medical nutrition therapy for diabetes or renal disease Requires referral by treating physician for patient with diabetes or renal disease NA    Glaucoma screening Patient must have one of the following risk factors:    Diabetes mellitus    Family history of glaucoma    -American age 50 and over    -American age 65 and over  done    Bone Density Measurement  (Dexa scan) Patient must have one of the following risk factors:    Determined by provider to be estrogen deficient    Vertebral abnormalities on x-ray indicative of osteoporosis, osteopenia, or vertebral fracture    Receiving, or expected to receive, equivalent of 5 mg of Prednisone, or greater, per day, for > 3 months.    Known hyperparathyroidism    Patient being monitored for response to osteoporosis therapy ordered    One-time AAA screen  ** Must be ordered as part of Medicare IPPE Patient must have one of the following:    Family history of AAA    Age 65-75, with history of smoking at least 100 cigarettes in lifetime NA      1. Encounter for Medicare annual wellness exam  - Fecal Occult Blood - FIT, iFOB ([facility]); Future  - PCS Use: Screening Digital Bilateral Mammogram; Future  - SCREENING, VISUAL ACUITY, QUANTITATIVE, BILAT  - Lipid Panel ([facility]) - Results < 1 hr  - Hepatitis C Antibody ([facility])  - HIV Ag/Ab Screen Sabine ([facility])  - Dexa hip/pelvis/spine*; Future  - Zoster vaccine at pharmacy  - Honoring choices/POLST given    MEDICARE PERSONAL PREVENTIVE SERVICES PLAN - IMMUNIZATIONS     Here are your recommended immunizations.  Take this home for your reference.                                                    IMMUNIZATIONS Description Recommend today?     Influenza (Flu shot) Prevents flu; should get every year No; is up to date.   PCV 13 Pneumonia vaccination; you get it once No; is up to date.   PPSV 23 Second pneumonia vaccination; usually get it 1 year after PCV 13 No; is up to date.   Zoster (Shingles) Prevents shingles; you get it once  (Check with Part D insurance for coverage, must receive at a pharmacy, not clinic) No: is not indicated today. WE DO NOT HAVE IT IN CLINIC   Tetanus Prevents tetanus; once every 10 years No; is up to date.

## 2019-06-25 NOTE — PATIENT INSTRUCTIONS
1. Bitten by mouse, initial encounter  - amoxicillin-clavulanate (AUGMENTIN) 875-125 MG tablet; Take 1 tablet by mouth 2 times daily for 7 days  Dispense: 14 tablet; Refill: 0    Follow-up if worsening fevers, redness, swelling or any other worrisome symptoms

## 2019-06-25 NOTE — LETTER
June 27, 2019      Lara [facility]  [address]  [facility] [address]        Dear Lara,      The results from the testing done at your last visit show you do NOT have HIV or Hepatitis C. Your cholesterol is on the high side. Healthy diet and regular exercise can help with this. At your follow-up visit, we can discuss whether a medication for this may be beneficial and we can talk over the risks and benefits of the medicine     We look forward to seeing you at your next visit.   Please see below for your test results.    Resulted Orders   Lipid Panel ([facility]) - Results < 1 hr   Result Value Ref Range    Cholesterol 226.5 (H) 0.0 - 200.0 mg/dL    Cholesterol/HDL Ratio 3.6 0.0 - 5.0    HDL Cholesterol 62.3 >40.0 mg/dL    LDL Cholesterol Calculated 140 (H) 0 - 129 mg/dL    Triglycerides 122.8 0.0 - 150.0 mg/dL    VLDL Cholesterol 24.6 7.0 - 32.0 mg/dL   Hepatitis C Antibody ([facility])   Result Value Ref Range    Hepatitis C Antibody Screen Negative Negative    Narrative    Test performed by:  [facility]  [address] [place] [address]   [facility]   Result Value Ref Range    HIV Antigen/Antibody Negative Negative    Narrative    Test performed by:  [facility] [address] [place] [address]  Method is Abbott HIV Ag/Ab for the detection of HIV p24 antigen, HIV-1   antibodies and HIV-2 antibodies.       If you have any questions, please call the clinic to make an appointment.    Sincerely,    Jen Conroy MD

## 2019-06-26 DIAGNOSIS — S05.02XA ABRASION OF LEFT CORNEA, INITIAL ENCOUNTER: ICD-10-CM

## 2019-06-26 RX ORDER — SULFACETAMIDE SODIUM 100 MG/ML
1-2 SOLUTION/ DROPS OPHTHALMIC
Qty: 10 ML | Refills: 0 | Status: SHIPPED | OUTPATIENT
Start: 2019-06-26 | End: 2022-10-06

## 2019-06-26 ASSESSMENT — ANXIETY QUESTIONNAIRES: GAD7 TOTAL SCORE: 0

## 2019-06-26 ASSESSMENT — ASTHMA QUESTIONNAIRES: ACT_TOTALSCORE: 13

## 2019-06-26 NOTE — RESULT ENCOUNTER NOTE
Please have  call and relay the following:   name: Ekaterina Cuellaru  Agency: [employer]  Language: Cameroonian   Telephone number: [phone]  Type of interpretation: Group, spoken; number of participants: 2       Dear Lara Frost:    The results from the testing done at your last visit show you do NOT have HIV or Hepatitis C. Your cholesterol is on the high side. Healthy diet and regular exercise can help with this. At your follow-up visit, we can discuss whether a medication for this may be beneficial and we can talk over the risks and benefits of the medicine    Please call the clinic with any questions.  We look forward to seeing you at your next visit.    Dr. Conroy

## 2019-07-09 DIAGNOSIS — Z00.00 ENCOUNTER FOR MEDICARE ANNUAL WELLNESS EXAM: ICD-10-CM

## 2019-07-09 LAB — HEMOCCULT STL QL IA: NEGATIVE

## 2019-07-15 ENCOUNTER — MEDICAL CORRESPONDENCE (OUTPATIENT)
Dept: HEALTH INFORMATION MANAGEMENT | Facility: CLINIC | Age: 74
End: 2019-07-15

## 2019-07-23 ENCOUNTER — OFFICE VISIT (OUTPATIENT)
Dept: FAMILY MEDICINE | Facility: CLINIC | Age: 74
End: 2019-07-23
Payer: COMMERCIAL

## 2019-07-23 VITALS
HEIGHT: 62 IN | WEIGHT: 103.2 LBS | TEMPERATURE: 98.3 F | RESPIRATION RATE: 16 BRPM | OXYGEN SATURATION: 97 % | DIASTOLIC BLOOD PRESSURE: 72 MMHG | HEART RATE: 70 BPM | SYSTOLIC BLOOD PRESSURE: 112 MMHG | BODY MASS INDEX: 18.99 KG/M2

## 2019-07-23 DIAGNOSIS — R42 DIZZINESS: Primary | ICD-10-CM

## 2019-07-23 RX ORDER — MECLIZINE HYDROCHLORIDE 25 MG/1
25 TABLET ORAL 2 TIMES DAILY PRN
Qty: 30 TABLET | Refills: 1 | Status: SHIPPED | OUTPATIENT
Start: 2019-07-23 | End: 2021-01-13

## 2019-07-23 ASSESSMENT — MIFFLIN-ST. JEOR: SCORE: 931.49

## 2019-07-23 NOTE — PROGRESS NOTES
"S: Lara Frost is a 74 year old female who returns for follow up of  Dizziness, intermittent mild, walks with cane.   has JOINT knee DJD  Patients states that main concern today is REFILL MECLEZINE    PMHX/PSHX/MEDS/ALLERGIES/SHX/FHX reviewed and updated in Epic.      ROS:  General: No fevers, chills  Head: No headache  Ears: No acute change in hearing.    CV: No chest pain or palpitations.  Resp: No shortness of breath.  No cough. No hemoptysis.  GI: No nausea, vomiting, constipation, diarrhea  : No urinary pains    O: /72   Pulse 70   Temp 98.3  F (36.8  C) (Oral)   Resp 16   Ht 1.575 m (5' 2.01\")   Wt 46.8 kg (103 lb 3.2 oz)   SpO2 97%   BMI 18.87 kg/m     Gen:  Well nourished and in NAD    Neck: supple without lymphadenopathy  CV:  RRR  - no murmurs, rubs, or gallups,   Pulm:  CTAB, no wheezes/rales/rhonchi, good air entry   ABD: soft, nontender, no masses, no rebound, BS intact throughout  Extrem: no cyanosis, edema or clubbing  Psych: Euthymic    Steady gait with cane   Dizziness/at base line   refill meclezine      RTC  In 3 months for regular care and coordination of careor sooner if develops new or worsening symptoms.    Carlos A Bates"

## 2019-07-23 NOTE — PATIENT INSTRUCTIONS
Your are doing well   gave you meclizine for dizziness   continue with physical activity as much as you can to be stronger and more steady

## 2019-07-26 DIAGNOSIS — J44.9 CHRONIC OBSTRUCTIVE PULMONARY DISEASE, UNSPECIFIED COPD TYPE (H): ICD-10-CM

## 2019-07-27 RX ORDER — FLUTICASONE PROPIONATE 220 UG/1
2 AEROSOL, METERED RESPIRATORY (INHALATION) 2 TIMES DAILY
Qty: 3 INHALER | Refills: 1 | Status: SHIPPED | OUTPATIENT
Start: 2019-07-27 | End: 2020-03-11

## 2019-11-05 ENCOUNTER — OFFICE VISIT (OUTPATIENT)
Dept: FAMILY MEDICINE | Facility: CLINIC | Age: 74
End: 2019-11-05
Payer: COMMERCIAL

## 2019-11-05 VITALS
SYSTOLIC BLOOD PRESSURE: 127 MMHG | HEIGHT: 62 IN | DIASTOLIC BLOOD PRESSURE: 78 MMHG | HEART RATE: 90 BPM | OXYGEN SATURATION: 95 % | WEIGHT: 105 LBS | TEMPERATURE: 98.3 F | RESPIRATION RATE: 20 BRPM | BODY MASS INDEX: 19.32 KG/M2

## 2019-11-05 DIAGNOSIS — M54.50 ACUTE BILATERAL LOW BACK PAIN WITHOUT SCIATICA: Primary | ICD-10-CM

## 2019-11-05 DIAGNOSIS — Z23 NEED FOR PROPHYLACTIC VACCINATION AND INOCULATION AGAINST INFLUENZA: ICD-10-CM

## 2019-11-05 ASSESSMENT — MIFFLIN-ST. JEOR: SCORE: 939.53

## 2019-11-05 NOTE — PATIENT INSTRUCTIONS
You can take tylenol twice to three time a day   Will add naproxen to be taken  with foods  Two times  aday  as needed for back pain   follow-up 3 to 4 weeks sooner in if no  better

## 2019-11-05 NOTE — PROGRESS NOTES
"S: Lara Frost is a 74 year old female who returns for follow up of low back pain mostly right  sided   Has had back problems before , ambulates with cane   Some weakness in legs as before, no hip pain, no CVA  tenderness, no recent trauma  Has taken  tylenol bid PRN with some relief  Patients states that main concern today is low back pain    PMHX/PSHX/MEDS/ALLERGIES/SHX/FHX reviewed and updated in Epic.      ROS:  General: No fevers, chills  Head: No headache  Ears: No acute change in hearing.    CV: No chest pain or palpitations.  Resp: No shortness of breath.  No cough. No hemoptysis.  GI: No nausea, vomiting, constipation, diarrhea  : No urinary pains    O: /78 (BP Location: Right arm, Patient Position: Sitting, Cuff Size: Adult Regular)   Pulse 90   Temp 98.3  F (36.8  C) (Oral)   Resp 20   Ht 1.575 m (5' 2\")   Wt 47.6 kg (105 lb)   SpO2 95%   BMI 19.20 kg/m     Gen:  Well nourished and in NAD    Neck: supple without lymphadenopathy  CV:  RRR  - no murmurs, rubs, or gallups,   Pulm:  CTAB, no wheezes/rales/rhonchi, good air entry   ABD: soft, nontender, no masses, no rebound, BS intact throughout  Extrem: no cyanosis, edema or clubbing  Psych: Euthymic    Back: tender para spinal mussels bilaterally, strength 4/5 as before    A Low back pain/mechanical   Deconditioning   strain  P tylenol 3 times a day  Will add naproxen 375 mg  Po bid with foods  wants wait on  P .Threapy      RTC in 4  weeks, for follow up of back pain  or sooner if develops new or worsening symptoms.    Carlos A Bates MD        "

## 2019-11-06 DIAGNOSIS — J44.9 CHRONIC OBSTRUCTIVE PULMONARY DISEASE, UNSPECIFIED COPD TYPE (H): ICD-10-CM

## 2019-11-06 RX ORDER — ALBUTEROL SULFATE 90 UG/1
2 AEROSOL, METERED RESPIRATORY (INHALATION) EVERY 6 HOURS PRN
Qty: 1 INHALER | Refills: 3 | Status: SHIPPED | OUTPATIENT
Start: 2019-11-06 | End: 2019-11-12

## 2019-11-08 ENCOUNTER — TELEPHONE (OUTPATIENT)
Dept: FAMILY MEDICINE | Facility: CLINIC | Age: 74
End: 2019-11-08

## 2019-11-08 DIAGNOSIS — J44.9 CHRONIC OBSTRUCTIVE PULMONARY DISEASE, UNSPECIFIED COPD TYPE (H): ICD-10-CM

## 2019-11-12 RX ORDER — ALBUTEROL SULFATE 90 UG/1
2 AEROSOL, METERED RESPIRATORY (INHALATION) EVERY 6 HOURS PRN
Qty: 3 INHALER | Refills: 3 | Status: SHIPPED | OUTPATIENT
Start: 2019-11-12 | End: 2020-12-18

## 2020-01-06 ENCOUNTER — MEDICAL CORRESPONDENCE (OUTPATIENT)
Dept: HEALTH INFORMATION MANAGEMENT | Facility: CLINIC | Age: 75
End: 2020-01-06

## 2020-01-17 DIAGNOSIS — M81.8 OTHER OSTEOPOROSIS WITHOUT CURRENT PATHOLOGICAL FRACTURE: ICD-10-CM

## 2020-01-17 RX ORDER — ALENDRONATE SODIUM 70 MG/1
70 TABLET ORAL
Qty: 12 TABLET | Refills: 3 | Status: SHIPPED | OUTPATIENT
Start: 2020-01-17 | End: 2021-02-15

## 2020-02-13 ENCOUNTER — DOCUMENTATION ONLY (OUTPATIENT)
Dept: FAMILY MEDICINE | Facility: CLINIC | Age: 75
End: 2020-02-13

## 2020-02-13 NOTE — PROGRESS NOTES
To be completed in Nursing note:    Please reference list for forms that require a visit for completion.  Please remind patients that providers are given 3-5 business days to complete and return forms.      Form type: AAA Best Care    Date form received: 2/13/2020    Date form completed by Physician:    How was form returned to patient (mailed, faxed, or at  for patient to ):    Date form mailed/faxed/left at  for patient and sent to HIM for scanning:      Once form is left for patient, faxed, or mailed PCS will then close the documentation only encounter.

## 2020-03-07 DIAGNOSIS — J30.2 SEASONAL ALLERGIC RHINITIS, UNSPECIFIED TRIGGER: ICD-10-CM

## 2020-03-09 RX ORDER — FLUTICASONE PROPIONATE 50 MCG
SPRAY, SUSPENSION (ML) NASAL
Qty: 48 ML | Refills: 2 | Status: SHIPPED | OUTPATIENT
Start: 2020-03-09 | End: 2021-01-14

## 2020-03-11 DIAGNOSIS — J44.9 CHRONIC OBSTRUCTIVE PULMONARY DISEASE, UNSPECIFIED COPD TYPE (H): ICD-10-CM

## 2020-03-11 RX ORDER — FLUTICASONE PROPIONATE 220 UG/1
2 AEROSOL, METERED RESPIRATORY (INHALATION) 2 TIMES DAILY
Qty: 3 INHALER | Refills: 1 | Status: SHIPPED | OUTPATIENT
Start: 2020-03-11 | End: 2020-04-13

## 2020-04-13 DIAGNOSIS — J44.9 CHRONIC OBSTRUCTIVE PULMONARY DISEASE, UNSPECIFIED COPD TYPE (H): ICD-10-CM

## 2020-04-13 RX ORDER — FLUTICASONE PROPIONATE 220 UG/1
2 AEROSOL, METERED RESPIRATORY (INHALATION) 2 TIMES DAILY
Qty: 3 INHALER | Refills: 1 | Status: SHIPPED | OUTPATIENT
Start: 2020-04-13 | End: 2020-10-19

## 2020-07-01 ENCOUNTER — MEDICAL CORRESPONDENCE (OUTPATIENT)
Dept: HEALTH INFORMATION MANAGEMENT | Facility: CLINIC | Age: 75
End: 2020-07-01

## 2020-10-15 DIAGNOSIS — J44.9 CHRONIC OBSTRUCTIVE PULMONARY DISEASE, UNSPECIFIED COPD TYPE (H): ICD-10-CM

## 2020-10-19 RX ORDER — FLUTICASONE PROPIONATE 220 UG/1
AEROSOL, METERED RESPIRATORY (INHALATION)
Qty: 36 INHALER | Refills: 1 | Status: SHIPPED | OUTPATIENT
Start: 2020-10-19 | End: 2021-04-19

## 2020-12-17 DIAGNOSIS — J44.9 CHRONIC OBSTRUCTIVE PULMONARY DISEASE, UNSPECIFIED COPD TYPE (H): ICD-10-CM

## 2020-12-18 RX ORDER — ALBUTEROL SULFATE 90 UG/1
AEROSOL, METERED RESPIRATORY (INHALATION)
Qty: 54 INHALER | Refills: 3 | Status: SHIPPED | OUTPATIENT
Start: 2020-12-18 | End: 2021-12-15

## 2020-12-18 NOTE — TELEPHONE ENCOUNTER
Pt requests albuterol (PROAIR HFA/PROVENTIL HFA/VENTOLIN HFA) 108 (90 Base) MCG/ACT inhaler and the last prescription was a year ago. Please address this message as needing. Thank you.

## 2021-01-12 DIAGNOSIS — R42 DIZZINESS: ICD-10-CM

## 2021-01-13 RX ORDER — MECLIZINE HYDROCHLORIDE 25 MG/1
TABLET ORAL
Qty: 30 TABLET | Refills: 1 | Status: SHIPPED | OUTPATIENT
Start: 2021-01-13

## 2021-01-14 DIAGNOSIS — J30.2 SEASONAL ALLERGIC RHINITIS, UNSPECIFIED TRIGGER: ICD-10-CM

## 2021-01-14 RX ORDER — FLUTICASONE PROPIONATE 50 MCG
SPRAY, SUSPENSION (ML) NASAL
Qty: 48 ML | Refills: 2 | Status: SHIPPED | OUTPATIENT
Start: 2021-01-14 | End: 2022-02-28

## 2021-02-13 DIAGNOSIS — M81.8 OTHER OSTEOPOROSIS WITHOUT CURRENT PATHOLOGICAL FRACTURE: ICD-10-CM

## 2021-02-15 RX ORDER — ALENDRONATE SODIUM 70 MG/1
TABLET ORAL
Qty: 12 TABLET | Refills: 3 | Status: SHIPPED | OUTPATIENT
Start: 2021-02-15 | End: 2022-03-22

## 2021-02-15 NOTE — TELEPHONE ENCOUNTER
The pt has not been seen since her last office visit on 11/05/2019 with Dr. Bates, please advise.    Jose Blanco on 2/15/2021 at 2:09 PM

## 2021-02-19 ENCOUNTER — TELEPHONE (OUTPATIENT)
Dept: FAMILY MEDICINE | Facility: CLINIC | Age: 76
End: 2021-02-19

## 2021-02-19 NOTE — TELEPHONE ENCOUNTER
Called, no answer and left message to call back today before 5 PM if interesting on covid vaccine.    Grace Hinds, [facility]      Due to patient being non-English speaking/uses sign language, an  was used for this visit. Only for face-to-face interpretation by an external agency, date and length of interpretation can be found on the scanned worksheet.     name: Joel   ID: [id number]  Agency: [employer] - telephone  Language: Latvian   Telephone number:   Type of interpretation: Telephone, spoken

## 2021-02-19 NOTE — TELEPHONE ENCOUNTER
Call patient second time and left message to call back today before 5 PM.    PAUL Escamilla        Due to patient being non-English speaking/uses sign language, an  was used for this visit. Only for face-to-face interpretation by an external agency, date and length of interpretation can be found on the scanned worksheet.     name:     ID: [id number]  Agency: [employer] - telephone  Language: Equ[place] Guinean   Telephone number:   Type of interpretation: Telephone, spoken

## 2021-03-08 ENCOUNTER — IMMUNIZATION (OUTPATIENT)
Dept: NURSING | Facility: CLINIC | Age: 76
End: 2021-03-08
Payer: COMMERCIAL

## 2021-03-08 PROCEDURE — 0031A PR COVID VAC JANSSEN AD26 0.5ML: CPT

## 2021-03-08 PROCEDURE — 91303 PR COVID VAC JANSSEN AD26 0.5ML: CPT

## 2021-04-18 DIAGNOSIS — J44.9 CHRONIC OBSTRUCTIVE PULMONARY DISEASE, UNSPECIFIED COPD TYPE (H): ICD-10-CM

## 2021-04-19 RX ORDER — FLUTICASONE PROPIONATE 220 UG/1
AEROSOL, METERED RESPIRATORY (INHALATION)
Qty: 12 G | Refills: 1 | Status: SHIPPED | OUTPATIENT
Start: 2021-04-19 | End: 2021-05-21

## 2021-05-13 ENCOUNTER — TRANSFERRED RECORDS (OUTPATIENT)
Dept: HEALTH INFORMATION MANAGEMENT | Facility: CLINIC | Age: 76
End: 2021-05-13

## 2021-05-18 ENCOUNTER — DOCUMENTATION ONLY (OUTPATIENT)
Dept: FAMILY MEDICINE | Facility: CLINIC | Age: 76
End: 2021-05-18

## 2021-05-18 NOTE — PROGRESS NOTES
To be completed in Nursing note:    Please reference list for forms that require a visit for completion.  Please remind patients that providers are given 3-5 business days to complete and return forms.      Form type:  Home Health Certification and Plan of Care    Date form received:  4.21.2021    Date form completed by Physician:  5/18/2021    How was form returned to patient (mailed, faxed, or at  for patient to ):  Fax back to [phone]    Date form mailed/faxed/left at  for patient and sent to HIM for scanning:  Forms has been faxed today, 5/18/2021      Once form is left for patient, faxed, or mailed PCS will then close the documentation only encounter.

## 2021-05-21 DIAGNOSIS — J44.9 CHRONIC OBSTRUCTIVE PULMONARY DISEASE, UNSPECIFIED COPD TYPE (H): ICD-10-CM

## 2021-05-21 RX ORDER — FLUTICASONE PROPIONATE 220 UG/1
AEROSOL, METERED RESPIRATORY (INHALATION)
Qty: 12 G | Refills: 1 | Status: SHIPPED | OUTPATIENT
Start: 2021-05-21 | End: 2021-06-09

## 2021-05-30 VITALS — WEIGHT: 102 LBS | BODY MASS INDEX: 18.66 KG/M2

## 2021-05-30 VITALS — WEIGHT: 104.1 LBS | BODY MASS INDEX: 19.04 KG/M2

## 2021-06-01 ENCOUNTER — MEDICAL CORRESPONDENCE (OUTPATIENT)
Dept: HEALTH INFORMATION MANAGEMENT | Facility: CLINIC | Age: 76
End: 2021-06-01

## 2021-06-04 DIAGNOSIS — J44.9 CHRONIC OBSTRUCTIVE PULMONARY DISEASE, UNSPECIFIED COPD TYPE (H): ICD-10-CM

## 2021-06-08 NOTE — PROGRESS NOTES
Patient instructed in use of flutter valve/aerobika.  Patient able to use without any difficulty.  Return demo completed in clinic.

## 2021-06-08 NOTE — PROGRESS NOTES
PULMONARY CLINIC FOLLOW UP NOTE    History:     HPI: Lara Frost is a 71 y.o. [place], who is her for follow up of cough and dyspnea.  She notes she is able to walk about 2 blocks before she has to rest. She endorses a cough that is productive of white sputum. She denies any fever or chills. She notes that she sometimes wheezes. No fever or chills.  She is on ventolin inhaler, fluticasone inhaler bid. She has not been using her albuterol nebulizer. She allergic rhinitis and postnasal drip for which she is on flonase.     PMHx/PSHx:  Allergic rhinitis  Postnasal drip     Social Hx:    Lifelong nonsmoker  She is a housewife    Exam/Data:     Visit Vitals     /68     Pulse 88     Resp 24     Wt 104 lb 1.6 oz (47.2 kg)     SpO2 93%  Comment: RA     BMI 18.44 kg/m2   , Body mass index is 18.44 kg/(m^2).  GEN: comfortable, NAD  HEENT: NCAT, EMOI, mmm  LN: no cervical LAD   CVS: S1S2, RRR  Lung: good air entry bilaterally, scattered crackles.  Abd: soft, nt, + BS. No masses  Ext: no c/c/e  Neuro: nonfocal  Vasc: intact radial pulses bilaterally  Musculoskeletal: FROM all extremities  Psych: normal affect    Data:   Labs and Imaging personally reviewed.  Pertinent findings include:    PFT DATA 6/8/2016: Post-bronchodilator  FEV1/FVC is 81 and is normal.  FEV1 is 85% predicted and is normal.  FVC is 85% predicted and normal.  There was improvement in spirometry after a single inhaled dose of bronchodilator however this is questionable due to inability to meet ATS criteria.  TLC is 91% predicted and is normal.  RV is 142% predicted and is increased.  DLCO is 56% predicted and is reducted when it  is corrected for hemoglobin.  Impression: Full Pulmonary Function Test is abnormal. Isolated diffusion capacity defect. Initial spirometry appeared to demonstrate obstruction, but this should be interpreted with caution due to inability to reliably reproduce results and not meeting ATS  criteria. Post-bronchodilator spirometry was normal.     Spirometry done on April 2014 from outside records revealed the following:  FVC 1.71 L (57%), FEV1 1.45 L (64%), ratio 85%. No significant postbronchodilator response. TLC 3.41 L (69%), residual volume 1.55 L (74%), DLCO 63%.    cxr 6/10/2016  FINDINGS: No change. There is a focal opacity present medially along right major fissure that appears to represent an area of atelectasis or scarring identified on previous CT scan. Lungs are hyperinflated but no new pulmonic process evident. Heart size normal.    CXR 9/2016:  FINDINGS: Stable hyperinflation both lungs with relatively increased lucency at the upper lobes, suggestive of emphysema. Stable, reticular nodular changes within the right lower lung suggestive of fibrosis. Linear lucencies at the right inferior perihilar region suggestive of bronchiectasis. Increased bronchial wall thickening in the perihilar regions suggestive of acute bronchitis. No focal areas of consolidation. Linear fibrosis in the superior left perihilar region. Cardiac silhouette size isunchanged and within normal limits.    Assessment/Plan:     Lara Frost is a 71 y.o. female, lifelong nonsmoker, with h/o allergic rhinitis and postnasal drip who is here for follow up of dyspnea. Spirometry, as noted above, there was questionable inability to perform test appropriately and it appears that this improved as the testing progressed. Her PFTs of 6/8/2016 were felt to be limited due to language barrier.  Pt's IgE and concerned about ABPA. CXR shows evidence of bronchiectasis.      Recommendations:  Work up for bronchiectasis: RF, CCP, IgG leves, IgE allergen panel, aspergillus galactomannan antigen, hyperensitivity panel, aspergillus antibodies, aspergillous fumigatus IgE, quantiferon, SSA, SSB  CT chest without contrasts  Swallow evaluation  Sputum cultures, sputum AFB  Flutter valve  LFT's  Continue current inhalers    FOLLOW UP: 1  rasheed Luz MD  Pulmonary and Critical Care Medicine  Electronically Signed on 2/15/2017    Current Outpatient Prescriptions   Medication Sig Dispense Refill     albuterol (PROVENTIL HFA;VENTOLIN HFA) 90 mcg/actuation inhaler Inhale 2 puffs every 6 (six) hours as needed.        albuterol (PROVENTIL) 2.5 mg /3 mL (0.083 %) nebulizer solution Inhale 2.5 mg every 6 (six) hours as needed.        alendronate (FOSAMAX) 70 MG tablet Take 70 mg by mouth.       CALCIUM CARBONATE-VITAMIN D2 ORAL Take 1 tablet by mouth 2 (two) times a day.        cholecalciferol, vitamin D3, 2,000 unit Tab Take 2,000 Units by mouth daily.        No current facility-administered medications for this visit.      No Known Allergies

## 2021-06-09 RX ORDER — FLUTICASONE PROPIONATE 220 UG/1
AEROSOL, METERED RESPIRATORY (INHALATION)
Qty: 12 G | Refills: 1 | Status: SHIPPED | OUTPATIENT
Start: 2021-06-09 | End: 2022-04-18

## 2021-06-09 NOTE — PROGRESS NOTES
PULMONARY CLINIC FOLLOW UP NOTE    History:     HPI: Lara Frost is a 72 y.o. [place], who is her for follow up of cough and dyspnea.     Pt notes that she has nasal congestion and sinusitis symptoms since yesterday. She has cough that is productive of white sputum.  No fever or chills.  She allergic rhinitis and postnasal drip for which she is on flonase. She denies any abx or prednisone since she was last seen.  She is on ventolin as needed, fluticasone inhaler bid.    PMHx/PSHx:  Allergic rhinitis  Postnasal drip     Social Hx:    Lifelong nonsmoker  She is a housewife    Exam/Data:     Visit Vitals     /76     Pulse 84     Resp 19     Wt 102 lb (46.3 kg)     SpO2 96%  Comment: RA     BMI 18.07 kg/m2   , Body mass index is 18.07 kg/(m^2).  GEN: comfortable, NAD  HEENT: NCAT, EMOI, mmm  LN: no cervical LAD   CVS: S1S2, RRR  Lung: good air entry bilaterally, scattered crackles.  Abd: soft, nt, + BS. No masses  Ext: no c/c/e  Neuro: nonfocal  Vasc: intact radial pulses bilaterally  Musculoskeletal: FROM all extremities  Psych: normal affect    Data:   Labs and Imaging personally reviewed.  Pertinent findings include:    PFT DATA 6/8/2016: Post-bronchodilator  FEV1/FVC is 81 and is normal.  FEV1 is 85% predicted and is normal.  FVC is 85% predicted and normal.  There was improvement in spirometry after a single inhaled dose of bronchodilator however this is questionable due to inability to meet ATS criteria.  TLC is 91% predicted and is normal.  RV is 142% predicted and is increased.  DLCO is 56% predicted and is reducted when it  is corrected for hemoglobin.  Impression: Full Pulmonary Function Test is abnormal. Isolated diffusion capacity defect. Initial spirometry appeared to demonstrate obstruction, but this should be interpreted with caution due to inability to reliably reproduce results and not meeting ATS criteria. Post-bronchodilator spirometry was  "normal.     Spirometry done on April 2014 from outside records revealed the following:  FVC 1.71 L (57%), FEV1 1.45 L (64%), ratio 85%. No significant postbronchodilator response. TLC 3.41 L (69%), residual volume 1.55 L (74%), DLCO 63%.    cxr 6/10/2016  FINDINGS: No change. There is a focal opacity present medially along right major fissure that appears to represent an area of atelectasis or scarring identified on previous CT scan. Lungs are hyperinflated but no new pulmonic process evident. Heart size normal.    CXR 9/2016:  FINDINGS: Stable hyperinflation both lungs with relatively increased lucency at the upper lobes, suggestive of emphysema. Stable, reticular nodular changes within the right lower lung suggestive of fibrosis. Linear lucencies at the right inferior perihilar region suggestive of bronchiectasis. Increased bronchial wall thickening in the perihilar regions suggestive of acute bronchitis. No focal areas of consolidation. Linear fibrosis in the superior left perihilar region. Cardiac silhouette size isunchanged and within normal limits.    CT chest 3/2017:  1. Findings compatible with infectious/inflammatory airways disease. Atypical etiologies, including nontuberculous mycobacterial or fungal infection are not excluded. Volume loss and/or consolidation in the anterior right lower lobe and lingula.  Recommend attention on 3 month follow-up given rounded consolidation configuration.  2. Mild bronchiectasis and airway thickening. No markedly dilated or impacted airways (i.e \"finger in glove\" appearance to suggest allergic bronchopulmonary aspergillosis).    Swallow evaluation 3/2017: demonstrated moderate oral dysphagia and mild pharyngeal dysphagia but low risk for aspiration.    Assessment/Plan:     Lara Frost is a 71 y.o. female, lifelong nonsmoker, with h/o allergic rhinitis and postnasal drip who is here for follow up of dyspnea. Spirometry, as noted above, there was questionable inability to " perform test appropriately and it appears that this improved as the testing progressed. Her PFTs of 6/8/2016 were felt to be limited due to language barrier.  Pt's IgE and concerned about ABPA. CXR shows evidence of bronchiectasis.  CT chest shows mild bronchiectasis with volume loss/consolidation RLL and lingula.  - Work up for bronchiectasis: RF, CCP,  aspergillus antibodies, aspergillous fumigatus IgE, , SSA, SSB, LFT - unremarkable.  - elevated IgG and IgM  - Hypersensitivity panel is positive for multiple allergens      Recommendations:  Quantiferon - will re-order again  Sputum cultures, sputum AFB - ordered last visit. Will re-order again.  Continue Flutter valve  Referral to immunoloy for elevated IgG, IgM, and markedly elevated IgE.  Continue albuterol inhaler and  fluticasone inhaler bid, and flonase    FOLLOW UP: 3 month    Will email the scheduling pool as patient has not followed up with either pulmonary or immunology.    Olga Luz MD  Pulmonary and Critical Care Medicine  Electronically Signed on 3/13/2017    Current Outpatient Prescriptions   Medication Sig Dispense Refill     albuterol (PROVENTIL HFA;VENTOLIN HFA) 90 mcg/actuation inhaler Inhale 2 puffs every 6 (six) hours as needed.        albuterol (PROVENTIL) 2.5 mg /3 mL (0.083 %) nebulizer solution Inhale 2.5 mg every 6 (six) hours as needed.        alendronate (FOSAMAX) 70 MG tablet Take 70 mg by mouth.       CALCIUM CARBONATE-VITAMIN D2 ORAL Take 1 tablet by mouth 2 (two) times a day.        cholecalciferol, vitamin D3, 2,000 unit Tab Take 2,000 Units by mouth daily.        No current facility-administered medications for this visit.      No Known Allergies

## 2021-06-09 NOTE — PROGRESS NOTES
Speech Language/Pathology  Videofluoroscopic Swallow Study       Problem:  Patient Active Problem List   Diagnosis     Near syncope       Onset date: 2/15/17 (order date)  Reason for evaluation: Cough and dyspnea; concern for aspiration  Pertinent History: Bronchiectasis   Current Diet: Regular textures and thin liquids  Baseline Diet: Regular textures and thin liquids    Patient is a 72-year-old female referred by Dr. Olga Luz due to concerns of aspiration.  Patient denies any coughing or choking with eating and drinking.  She reports that she does cough on her saliva while sleeping at night.  This morning, she was noted to have a frequent dry cough at baseline (i.e., prior to being given any oral intake).     Patient presents as alert and cooperative during this evaluation.   A professional Armenian  was present    Patient was given honey-thick, puree, nectar-thick, and thin consistencies of barium.  Of note, when patient was handed cups of nectar-thick and thin barium, she took very large self-administered sips.    Oral Phase:    Dentition/Oral hygiene: Patient appears to have only her bottom front teeth.  She wears an upper denture.  Oral hygiene was adequate.    Bolus prep and oral control was mildly to moderately impaired/dyscoordinated.  Some tongue pumping was used to initiate posterior transit of honey-thick and puree consistencies.  Piecemeal deglutition was observed with all consistencies.    Anterior-Posterior transit was mildly impaired/delayed.    Premature spillage occurred with all consistencies.    Tongue base retraction was moderately impaired.    Moderate oral stasis occurred with all consistencies.  This cleared when patient completed a spontaneous dry swallow.    Pharyngeal Phase:    Aspiration did not occur with any consistency during this study.     Laryngeal penetration did not occur with any consistency during this study.     Swallow response was significantly delayed with  all consistencies.  This resulted in pourover to the pyriform sinuses.    Epiglottic movement was complete consistently across texture trials.    Pharyngeal stasis did not occur with any consistency.    Pharyngeal constriction was not impaired.    Hyolaryngeal elevation was slow and reduced. Hyolaryngeal excursion was weak, slow and reduced.    Cricopharyngeal function was not impaired. Cervical esophageal function was not impaired.    Assessment:    Patient demonstrated moderate oral dysphagia and mild pharyngeal dysphagia.    Patient is at low aspiration risk with all intake.    Rehab potential is good based on prior level of function and evaluation results.    Recommendations:    Plan: Continue current diet of Regular textures and thin liquids    Strategies: Patient to eat slowly and take one small bite or sip at a time    Speech Therapy is not recommended at this time    Referrals: N/A     Via , SLP provided patient with verbal education re: purpose and results of study, diet recommendation, and safe swallowing precautions.  Patient verbalized understanding.  She had no questions for SLP.    40 dysphagia minutes    Meliza Zabala MA, CCC-SLP

## 2021-07-03 NOTE — ADDENDUM NOTE
Addendum Note by All Ulloa at 3/17/2017 11:32 AM     Author: All Ulloa Service: -- Author Type:     Filed: 3/17/2017 11:32 AM Encounter Date: 2/15/2017 Status: Signed    : All Ulloa ()    Addended by: ALL ULLOA on: 3/17/2017 11:32 AM        Modules accepted: Orders

## 2021-10-04 ENCOUNTER — MEDICAL CORRESPONDENCE (OUTPATIENT)
Dept: HEALTH INFORMATION MANAGEMENT | Facility: CLINIC | Age: 76
End: 2021-10-04

## 2021-10-06 ENCOUNTER — DOCUMENTATION ONLY (OUTPATIENT)
Dept: FAMILY MEDICINE | Facility: CLINIC | Age: 76
End: 2021-10-06

## 2021-10-08 NOTE — PROGRESS NOTES
To be completed in Nursing note:    Please reference list for forms that require a visit for completion.  Please remind patients that providers are given 3-5 business days to complete and return forms.      Form type:  Prescription/Certificate of Medical Necessity    Date form received: 9/15/21    Date form completed by Physician:  10/4/2021    How was form returned to patient (mailed, faxed, or at  for patient to ):  Fax to [phone]    Date form mailed/faxed/left at  for patient and sent to HIM for scanning: Faxed 10/6/2021    Once form is left for patient, faxed, or mailed PCS will then close the documentation only encounter.

## 2021-11-02 ENCOUNTER — OFFICE VISIT (OUTPATIENT)
Dept: FAMILY MEDICINE | Facility: CLINIC | Age: 76
End: 2021-11-02
Payer: COMMERCIAL

## 2021-11-02 VITALS
DIASTOLIC BLOOD PRESSURE: 84 MMHG | BODY MASS INDEX: 18.21 KG/M2 | TEMPERATURE: 98.6 F | HEART RATE: 91 BPM | HEIGHT: 63 IN | WEIGHT: 102.8 LBS | SYSTOLIC BLOOD PRESSURE: 147 MMHG | OXYGEN SATURATION: 96 % | RESPIRATION RATE: 16 BRPM

## 2021-11-02 DIAGNOSIS — Z23 HIGH PRIORITY FOR 2019-NCOV VACCINE: ICD-10-CM

## 2021-11-02 DIAGNOSIS — Z23 NEED FOR PROPHYLACTIC VACCINATION AND INOCULATION AGAINST INFLUENZA: Primary | ICD-10-CM

## 2021-11-02 PROCEDURE — 90686 IIV4 VACC NO PRSV 0.5 ML IM: CPT | Performed by: FAMILY MEDICINE

## 2021-11-02 PROCEDURE — 91300 COVID-19,PF,PFIZER (12+ YRS): CPT | Performed by: FAMILY MEDICINE

## 2021-11-02 PROCEDURE — G0008 ADMIN INFLUENZA VIRUS VAC: HCPCS | Performed by: FAMILY MEDICINE

## 2021-11-02 PROCEDURE — 0004A COVID-19,PF,PFIZER (12+ YRS): CPT | Performed by: FAMILY MEDICINE

## 2021-11-02 PROCEDURE — G0439 PPPS, SUBSEQ VISIT: HCPCS | Performed by: FAMILY MEDICINE

## 2021-11-02 RX ORDER — OMEGA-3 FATTY ACIDS/FISH OIL 300-1000MG
1000 CAPSULE ORAL
COMMUNITY

## 2021-11-02 ASSESSMENT — MIFFLIN-ST. JEOR: SCORE: 937.17

## 2021-11-02 NOTE — PROGRESS NOTES
65+ Annual Wellness Visit         HPI     This 76 year old female presents as an established patient  Carlos A Bates who presents for a Initial Medicare Wellness Visit    Other issues patient wants to be addressed today:    Chief Complaint   Patient presents with     Physical     65+ anual wellness. booster shot.     Imm/Inj     Flu Shot     Imm/Inj     COVID-19 VACCINE         Patient Active Problem List   Diagnosis     Sebaceous cyst     Edema of larynx     Eczema     Tear film insufficiency     Disease of lung     Chronic rhinitis     Restrictive lung disease     Osteoporosis without pathological fracture     Near syncope   .  ASTHMA on albuterol    Past Medical History:   Diagnosis Date     Uncomplicated asthma         Family History   Problem Relation Age of Onset     No Known Problems Mother      No Known Problems Father      No Known Problems Maternal Grandmother      No Known Problems Maternal Grandfather      No Known Problems Paternal Grandmother      No Known Problems Paternal Grandfather      No Known Problems Brother      No Known Problems Sister      No Known Problems Son      No Known Problems Daughter      No Known Problems Maternal Half-Brother      No Known Problems Maternal Half-Sister      No Known Problems Paternal Half-Brother      No Known Problems Paternal Half-Sister      No Known Problems Niece      No Known Problems Nephew      No Known Problems Cousin      No Known Problems Other      Diabetes No family hx of      Coronary Artery Disease No family hx of      Hypertension No family hx of      Hyperlipidemia No family hx of      Breast Cancer No family hx of      Cancer - colorectal No family hx of      Ovarian Cancer No family hx of      Prostate Cancer No family hx of      Other Cancer No family hx of      Mental Illness No family hx of      Cerebrovascular Disease No family hx of      Anesthesia Reaction No family hx of      Asthma No family hx of      Osteoporosis No family hx of       Known Genetic Syndrome No family hx of      Obesity No family hx of      Unknown/Adopted No family hx of      Colon Cancer No family hx of      Depression No family hx of      Anxiety Disorder No family hx of      Mental Illness No family hx of      Substance Abuse No family hx of      Genetic Disorder No family hx of      Thyroid Disease No family hx of      No Known Problems Sister      No Known Problems Daughter      No Known Problems Maternal Aunt      No Known Problems Paternal Aunt      Hereditary Breast and Ovarian Cancer Syndrome No family hx of      Cancer No family hx of      Endometrial Cancer No family hx of          Problem List, Family History and past Medical History reviewed and unchanged/updated.      Visual Acuity:  Right Eye: 10/30   Left Eye: 10/30  Both Eyes: 10/25    Uses cane    FALL RISK ASSESSMENT 11/16/2021 11/2/2021 6/25/2019 8/30/2018   Fallen 2 or more times in the past year? Yes No No No   Any fall with injury in the past year? No No No No            Health Risk Assessment / Review of Systems     Constitutional: Any fevers or night sweats? No     Eyes:  Vision problems   No     Hearing Do you feel you have hearing loss?  No     Cardiovascular: Any chest pain, fast or irregular heart beat, calf pain with walking?     No           Respiratory:   Any breathing problems or cough?    YES     Gastrointestinal: Any stomach or stool problems?   No      Genitourinary: Do you have difficulty controlling urination?   No     Muscles and Joints: Any joint stiffness or soreness?    YES     Skin: Any concerning lesions or moles?   No     Nervous System: Any loss of strength or feeling, numbness or tingling, shaking, dizziness, or headache?  Yes    Mental Health: Any depression, anxiety or problems sleeping?    No     Cognition: Do you have any problems with your memory?  No     PHQ-2 Score:   PHQ-2 ( 1999 Pfizer) 11/16/2021 11/2/2021   Q1: Little interest or pleasure in doing things 0 0   Q2: Feeling  down, depressed or hopeless 0 0   PHQ-2 Score 0 0   PHQ-2 Total Score (12-17 Years)- Positive if 3 or more points; Administer PHQ-A if positive 0 0       PHQ-9 Score:   [facility] Follow-up to PHQ 6/25/2019   PHQ-9 9. Suicide Ideation past 2 weeks Not at all            Medical Care     What other specialists or organizations are involved in your medical care?  [facility]  Patient Care Team       Relationship Specialty Notifications Start End    Carlos A Bates MD PCP - General Family Practice  1/3/13     Phone: [phone] Fax: [phone] [address] [facility] [address]    Carlos A Bates MD Assigned PCP   5/27/21     Phone: [phone] Fax: [phone] [address] [facility] [address]                 Social History / Home Safety     Social History     Tobacco Use     Smoking status: Never Smoker     Smokeless tobacco: Never Used   Substance Use Topics     Alcohol use: No     Marital Status:  Who lives in your household? , grand daughter and daughter    Does your home have any of the following safety concerns? Loose rugs in the hallway, no grab bars in the bathroom, no handrails on the stairs or have poorly lit areas?  No     Do you feel threatened or controlled by a partner, ex-partner or anyone in your life? No     Has anyone hurt you physically, for example by pushing, hitting, slapping or kicking you   or forcing you to have sex? No          Functional Status     Do you need help with dressing yourself, bathing, or walking? YES     Do you need help with the phone, transportation, shopping, preparing meals, housework, laundry, medications or managing money? YES       Risk Behaviors and Healthy Habits     History   Smoking Status     Never Smoker   Smokeless Tobacco     Never Used     How many servings of fruits and vegetables do you eat a day? 2    Exercise: None No exercise     Do you frequently drive without a seatbelt? No     Do you use any other drugs? No         Do you use alcohol?No       "Frailty Assessment            1. By yourself and note using aids, do you have difficulty walking up 10 steps without resting?   YES  (1 for Yes, 0 for No)    2. By yourself and not using mobility aids, do you have any difficulty walking several hundred yards?  YES  (1 for Yes, 0 for No)    3. Have you lost 10 or more pounds unintentionally in the previous year? No  (If \"Yes\" and >5% weight loss, then score 1.  Score 0, if <5% weight loss or \"No\" weight loss)    4. How much of the times during the past 3 weeks did you feel tired? 3. Some of the time (\"1\" or \"2\" are scored 1, others 0)    5.  A doctor told the patient they had the following illnesses:  Asthma (0-4 = score 0, 5-11= score 1)              Frailty Assessment              1. (1 for Yes, 0 for No)    2. (1 for Yes, 0 for No)    3. (If \"Yes\" and >5% weight loss, then score 1.  Score 0, if <5% weight loss or \"No\" weight loss)    4. (\"1\" or \"2\" are scored 1, others 0)    5. Count number of illnesses. (0-4 = score 0, 5-11= score 1)    Total score: 2    Frailty screen score (3-5 = frail; consider interdisciplinary assessment and care.  1-2 = prefrail; at risk for adverse health events; 0 = robust)      EVALUATION OF COGNITIVE FUNCTION     Mood/affect:Normal  Appearance:Normal  Family member/caregiver input: Normal    Mini Cog Scoring   3 points   Clock Draw Test result:  Normal     Cognitive screen is:Negative      Other Assessments     CV Risk based on Pooled Cohort Risk (consider assessing every 4-6 years; consider statin in patients with 10-yr risk > 7.5%):   The 10-year ASCVD risk score (Nilton ZAMBRANO Jr., et al., 2013) is: 17.9%    Values used to calculate the score:      Age: 76 years      Sex: Female      Is Non- : No      Diabetic: No      Tobacco smoker: No      Systolic Blood Pressure: 143 mmHg      Is BP treated: No      HDL Cholesterol: 62.3 mg/dL      Total Cholesterol: 226.5 mg/dL    Advance Directives: Discussed with patient and " "family as appropriate.  Has patient completed advance directives and/or a living will?  no     Discussed with patient. Declined further resources.     Immunization History   Administered Date(s) Administered     COVID-19,PF,Vipin 03/08/2021     COVID-19,PF,Pfizer (12+ Yrs) 11/02/2021     Flu, Unspecified 08/23/2012, 10/22/2014     HepB, Unspecified 09/15/2000     HepB-Adult 06/25/2019     Influenza (High Dose) 3 valent vaccine 10/25/2016, 10/09/2017, 01/07/2019     Influenza (IIV3) PF 08/23/2012, 10/16/2013     Influenza Vaccine IM > 6 months Valent IIV4 (Alfuria,Fluzone) 10/22/2014, 11/02/2021     Influenza Vaccine, 6+MO IM (QUADRIVALENT W/PRESERVATIVES) 10/22/2014, 11/05/2019     Pneumo Conj 13-V (2010&after) 04/23/2013, 06/25/2019     Pneumococcal 23 valent 04/23/2013     TD (ADULT, 7+) 05/17/2017     TDAP Vaccine (Adacel) 10/25/2012     Td (Adult), Adsorbed 07/21/2000, 05/17/2017     Tdap (Adacel,Boostrix) 10/25/2012     Reviewed Immunization Record Today         Physical Exam     Vitals: BP (!) 147/84 (BP Location: Left arm, Patient Position: Sitting, Cuff Size: Adult Regular)   Pulse 91   Temp 98.6  F (37  C) (Oral)   Resp 16   Ht 1.603 m (5' 3.11\")   Wt 46.6 kg (102 lb 12.8 oz)   SpO2 96%   BMI 18.15 kg/m    BMI= Body mass index is 18.15 kg/m .  EXAM:  Respiratory: negative,  Lungs clear          Assessment and Plan       Reviewed Preventive Services and Plan form with patient as specified in Patient Instructions.    Positive findings on assessment:  Asthma by history  Lara was seen today for physical, imm/inj and imm/inj.    Diagnoses and all orders for this visit:    Need for prophylactic vaccination and inoculation against influenza  -     Cancel: INFLUENZA, QUAD, HIGH DOSE, PF, 65YR + (FLUZONE HD)  -     INFLUENZA VACCINE IM > 6 MONTHS VALENT IIV4 (AFLURIA/FLUZONE)    High priority for 2019-nCoV vaccine  -     COVID-19,PIOTRPFIZER (12+ Yrs)          Options for treatment and follow-up care were " reviewed with the Lara T Margot and/or guardian engaged in the decision making process and verbalized understanding of the options discussed and agreed with the final plan.    Carlos A Bates MD

## 2021-11-02 NOTE — NURSING NOTE
Due to patient being non-English speaking/uses sign language, an  was used for this visit. Only for face-to-face interpretation by an external agency, date and length of interpretation can be found on the scanned worksheet.     name: Brandon  Agency: [employer]  Language: Delgadomese   Telephone number:   Type of interpretation: Face-to-face, spoken

## 2021-11-02 NOTE — PATIENT INSTRUCTIONS
MEDICARE PERSONAL PREVENTIVE SERVICES PLAN - IMMUNIZATIONS     Here are your recommended immunizations.  Take this home for your reference.                                                    IMMUNIZATIONS Description Recommend today?     Influenza (Flu shot) Prevents flu; should get every year Yes; Recommended and ordered.   PCV 13 Pneumonia vaccination; you get it once No; is up to date.   PPSV 23 Second pneumonia vaccination; usually get it 1 year after PCV 13 No; is up to date.   Zoster (Shingles) Prevents shingles; you get it once  (Check with Part D insurance for coverage, must receive at a pharmacy, not clinic) Yes; Recommended pt to get  from pharmacy.   Tetanus Prevents tetanus; once every 10 years No; is up to date.

## 2021-11-03 ENCOUNTER — MEDICAL CORRESPONDENCE (OUTPATIENT)
Dept: HEALTH INFORMATION MANAGEMENT | Facility: CLINIC | Age: 76
End: 2021-11-03
Payer: COMMERCIAL

## 2021-11-03 ENCOUNTER — DOCUMENTATION ONLY (OUTPATIENT)
Dept: FAMILY MEDICINE | Facility: CLINIC | Age: 76
End: 2021-11-03

## 2021-11-03 NOTE — PROGRESS NOTES
To be completed in Nursing note:    Please reference list for forms that require a visit for completion.  Please remind patients that providers are given 3-5 business days to complete and return forms.      Form type:  Prescription/Certificate of Medical Necessity    Date form received:     Date form completed by Physician:  11.3.21    How was form returned to patient (mailed, faxed, or at  for patient to ):  Fax back to [phone]    Date form mailed/faxed/left at  for patient and sent to HIM for scanning:  Forms faxed 11.3.21      Once form is left for patient, faxed, or mailed PCS will then close the documentation only encounter.

## 2021-11-16 ENCOUNTER — OFFICE VISIT (OUTPATIENT)
Dept: FAMILY MEDICINE | Facility: CLINIC | Age: 76
End: 2021-11-16
Payer: COMMERCIAL

## 2021-11-16 VITALS
DIASTOLIC BLOOD PRESSURE: 82 MMHG | BODY MASS INDEX: 18.07 KG/M2 | WEIGHT: 102 LBS | RESPIRATION RATE: 18 BRPM | OXYGEN SATURATION: 95 % | SYSTOLIC BLOOD PRESSURE: 143 MMHG | HEIGHT: 63 IN | HEART RATE: 99 BPM | TEMPERATURE: 99.5 F

## 2021-11-16 DIAGNOSIS — J45.40 MODERATE PERSISTENT ASTHMA WITHOUT COMPLICATION: ICD-10-CM

## 2021-11-16 DIAGNOSIS — L20.89 OTHER ATOPIC DERMATITIS: Primary | ICD-10-CM

## 2021-11-16 PROCEDURE — 99213 OFFICE O/P EST LOW 20 MIN: CPT | Performed by: FAMILY MEDICINE

## 2021-11-16 RX ORDER — TRIAMCINOLONE ACETONIDE 0.25 MG/G
OINTMENT TOPICAL 2 TIMES DAILY
Qty: 15 G | Refills: 1 | Status: SHIPPED | OUTPATIENT
Start: 2021-11-16 | End: 2022-01-26

## 2021-11-16 ASSESSMENT — MIFFLIN-ST. JEOR: SCORE: 931.8

## 2021-11-16 NOTE — PROGRESS NOTES
"S: Lara Frost is a 76 year old female who returns for follow up of  Asthma and eczema  Eczema is dryness all over body but now with more dryness  in distal  fingers left  Hand     Also states  geovanny her albuterol is not relieving symptoms  as before but uses  It intermittently     PMHX/PSHX/MEDS/ALLERGIES/SHX/FHX reviewed and updated in Epic.    ROS:  General: No fevers, chills  Head: No headache  Ears: No acute change in hearing.    CV: No chest pain or palpitations.  Resp: No shortness of breath.  No cough. No hemoptysis.  GI: No nausea, vomiting, constipation, diarrhea  : No urinary pains    O: BP (!) 143/82 (BP Location: Left arm, Patient Position: Sitting, Cuff Size: Adult Regular)   Pulse 99   Temp 99.5  F (37.5  C) (Tympanic)   Resp 18   Ht 1.6 m (5' 3\")   Wt 46.3 kg (102 lb)   SpO2 95%   BMI 18.07 kg/m     Gen:  Well nourished and in NAD  HEENT: PERRLA; TMs normal color and landmarks; nasopharynx pink and moist; oropharynx pink and moist  Neck: supple without lymphadenopathy  CV:  RRR  - no murmurs, rubs, or gallups,   Pulm:  CTAB, no wheezes/rales/rhonchi, good air entry   ABD: soft, nontender, no masses, no rebound, BS intact throughout  Extrem: no cyanosis, edema or clubbing  Psych: Euthymic      (L20.89) Other atopic dermatitis  (primary encounter diagnosis)  Comment:  lubrication  Plan: triamcinolone (KENALOG) 0.025 % external         ointment  2 Asthma follow-up in one month exam is stable today but ACT shows  Severe   Has been seen by pulmonary medicine/ most likely: asthma   at one time she was told she has restrictive lung disease and  ? fungal reaction  Will get more information and start prn inhaled steroids        Total of 25  minutes was spent in face to face contact with patient with > 50% in counseling and coordination of care.  Options for treatment and/or follow-up care were reviewed with the patient. Lara Frost was engaged and actively involved in the decision making process. She " verbalized understanding of the options discussed and was satisfied with the final plan.    RTC in 1 to weeks, for follow up of asthma/-start inhaled steroids or sooner if develops new or worsening symptoms.    Carlos A Bates MD

## 2021-11-16 NOTE — PATIENT INSTRUCTIONS
Steroid cream twice a day  Am and evening   lubrication as much as you can   refill albuterol   follow-up in 2 to 4 weeks

## 2021-11-16 NOTE — NURSING NOTE
Due to patient being non-English speaking/uses sign language, an  was used for this visit. Only for face-to-face interpretation by an external agency, date and length of interpretation can be found on the scanned worksheet.     name: Brandon Mendoza  Agency: [employer]  Language: Maltese   Telephone number: [phone]  Type of interpretation: Face-to-face, spoken

## 2021-11-18 ASSESSMENT — ASTHMA QUESTIONNAIRES: ACT_TOTALSCORE: 6

## 2021-12-14 ENCOUNTER — OFFICE VISIT (OUTPATIENT)
Dept: FAMILY MEDICINE | Facility: CLINIC | Age: 76
End: 2021-12-14
Payer: COMMERCIAL

## 2021-12-14 VITALS
DIASTOLIC BLOOD PRESSURE: 84 MMHG | TEMPERATURE: 98.2 F | RESPIRATION RATE: 16 BRPM | BODY MASS INDEX: 17.71 KG/M2 | WEIGHT: 100 LBS | HEART RATE: 81 BPM | SYSTOLIC BLOOD PRESSURE: 135 MMHG | OXYGEN SATURATION: 96 %

## 2021-12-14 DIAGNOSIS — J44.9 CHRONIC OBSTRUCTIVE PULMONARY DISEASE, UNSPECIFIED COPD TYPE (H): ICD-10-CM

## 2021-12-14 DIAGNOSIS — L30.9 HAND DERMATITIS: Primary | ICD-10-CM

## 2021-12-14 PROCEDURE — 99214 OFFICE O/P EST MOD 30 MIN: CPT | Performed by: FAMILY MEDICINE

## 2021-12-14 RX ORDER — GUAIFENESIN 600 MG/1
1200 TABLET, EXTENDED RELEASE ORAL 2 TIMES DAILY
Qty: 60 TABLET | Refills: 0 | Status: SHIPPED | OUTPATIENT
Start: 2021-12-14 | End: 2022-10-06

## 2021-12-14 NOTE — PROGRESS NOTES
S: Lara Frost is a 76 year old female who returns for follow up of  Hand dermatitis, good results with steroid cream fore about one week    using more lubrication   2 Reill :  proair  Patients states that main concern today is follow-up dermatitis    PMHX/PSHX/MEDS/ALLERGIES/SHX/FHX reviewed and updated in Epic.      ROS:  General: No fevers, chills  Head: No headache  Ears: No acute change in hearing.    CV: No chest pain or palpitations.  Resp: No shortness of breath.  No cough. No hemoptysis.  GI: No nausea, vomiting, constipation, diarrhea  : No urinary pains    O: /84 (BP Location: Right arm, Patient Position: Sitting, Cuff Size: Adult Regular)   Pulse 81   Temp 98.2  F (36.8  C) (Oral)   Resp 16   Wt 45.4 kg (100 lb)   SpO2 96%   BMI 17.71 kg/m     Gen:  Well nourished and in NAD    CV:  RRR  - no murmurs, rubs, or gallups,   Pulm:  CTAB, no wheezes/rales/rhonchi, good air entry   ABD: soft, nontender, no masses, no rebound, BS intact throughout  Extrem: no cyanosis, edema or clubbing  Psych: Euthymic    A/P  1 Hand dermatitis/: much improved    continue lubrication for ever    2  COPD: refill proair   add  mucolitics  .    RTC in 12 weeks, for follow up of hand dermatitis  or sooner if develops new or worsening symptoms.    Carlos A Bates MD

## 2021-12-14 NOTE — PATIENT INSTRUCTIONS
Dermatitis is much imploded use once a day in affected areas   lubrication ans many time a day abut al least twice day   follow-up in  3 months    Medicine to expectorate better given

## 2021-12-15 RX ORDER — ALBUTEROL SULFATE 90 UG/1
AEROSOL, METERED RESPIRATORY (INHALATION)
Qty: 18 G | Refills: 3 | Status: SHIPPED | OUTPATIENT
Start: 2021-12-15 | End: 2023-01-30

## 2022-01-25 DIAGNOSIS — L20.89 OTHER ATOPIC DERMATITIS: ICD-10-CM

## 2022-01-26 RX ORDER — TRIAMCINOLONE ACETONIDE 0.25 MG/G
OINTMENT TOPICAL
Qty: 15 G | Refills: 1 | Status: SHIPPED | OUTPATIENT
Start: 2022-01-26 | End: 2023-10-12

## 2022-02-16 ENCOUNTER — OFFICE VISIT (OUTPATIENT)
Dept: FAMILY MEDICINE | Facility: CLINIC | Age: 77
End: 2022-02-16
Payer: COMMERCIAL

## 2022-02-16 VITALS
HEART RATE: 86 BPM | SYSTOLIC BLOOD PRESSURE: 151 MMHG | TEMPERATURE: 98.4 F | DIASTOLIC BLOOD PRESSURE: 78 MMHG | RESPIRATION RATE: 16 BRPM | BODY MASS INDEX: 18.17 KG/M2 | OXYGEN SATURATION: 96 % | WEIGHT: 102.6 LBS
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
## 2022-02-16 DIAGNOSIS — R03.0 ELEVATED BLOOD PRESSURE READING WITHOUT DIAGNOSIS OF HYPERTENSION: ICD-10-CM

## 2022-02-16 PROCEDURE — 99213 OFFICE O/P EST LOW 20 MIN: CPT | Mod: GC | Performed by: STUDENT IN AN ORGANIZED HEALTH CARE EDUCATION/TRAINING PROGRAM

## 2022-02-16 NOTE — PATIENT INSTRUCTIONS
Watch for rash, fever, or swelling of the finger  Check and write down your blood pressure  F/u 1-2 w for BP

## 2022-02-16 NOTE — PROGRESS NOTES
Preceptor attestation:  Vital signs reviewed: BP (!) 151/78 (BP Location: Left arm, Patient Position: Sitting, Cuff Size: Adult Regular)   Pulse 86   Temp 98.4  F (36.9  C) (Oral)   Resp 16   Wt 46.5 kg (102 lb 9.6 oz)   SpO2 96%   BMI 18.17 kg/m      Patient seen, evaluated, and discussed with the resident.  I have verified the content of the note, which accurately reflects my assessment of the patient and the plan of care.    Supervising physician: Ewa Alcantara MD  [facility]

## 2022-02-16 NOTE — PROGRESS NOTES
Assessment and Plan    Lara was seen today for mouse bite couple hours prior to the visit.  Very mild bleeding which was stopped spontaneously.  Area was washed with alcohol.  Exam reveals pinpoint track on the middle left finger without sign of infection.  Patient blood pressure found to be high in 2 different reading.  No prior history of high blood pressure.  Patient recommended checking an reporting blood pressure daily and present the reading in her upcoming follow-up    Diagnoses and all orders for this visit:    Bitten by mouse, initial encounter    Elevated blood pressure reading without diagnosis of hypertension             Options for treatment and follow-up care were reviewed with the patient. Patient engaged in the decision making process and verbalized understanding of the options discussed and agreed with the final plan.    Patient was staffed with attending physician MD Edith Schultz MD PGY2           HPI       Lara Frost is a 76 year old year old female w/ PMH of   Patient Active Problem List   Diagnosis     Sebaceous cyst     Edema of larynx     Eczema     Tear film insufficiency     Disease of lung     Chronic rhinitis     Restrictive lung disease     Osteoporosis without pathological fracture     Near syncope    who presents for mouse bite that took place couple hours prior to the visit.  Patient was using a mouse trap at her place, grab a mouse by the tail and bit on the left middle finger.  Patient reported mild bleeding from the area then stop spontaneously.  Patient did wash the area with alcohol.  Denies pain, itching, or rash.    A Macedonian  was used for  this visit.    +++++++      Problem, Medication and Allergy Lists were   reviewed and updated if needed.     Patient Active Problem List    Diagnosis Date Noted     Near syncope 09/16/2016     Priority: Medium     Osteoporosis without pathological fracture 05/10/2016     Priority: Medium     Chronic  rhinitis 04/20/2016     Priority: Medium     Moderate persistent   See allergist note       Restrictive lung disease 04/20/2016     Priority: Medium     Moderate   see PFTs       Eczema 08/19/2014     Priority: Medium     Tear film insufficiency 08/19/2014     Priority: Medium     Fu with ophthalmology  Problem list name updated by automated process. Provider to review       Disease of lung 08/19/2014     Priority: Medium     PFT 2014/in chart  Problem list name updated by automated process. Provider to review       Edema of larynx 04/06/2014     Priority: Medium     Sebaceous cyst 04/23/2013     Priority: Medium     Removed on 2012.         Patient is an established patient of this clinic.         Review of Systems:   10 point ROS negative other than as specified above.         Physical Exam:   BP (!) 151/78 (BP Location: Left arm, Patient Position: Sitting, Cuff Size: Adult Regular)   Pulse 86   Temp 98.4  F (36.9  C) (Oral)   Resp 16   Wt 46.5 kg (102 lb 9.6 oz)   SpO2 96%   BMI 18.17 kg/m      Vital signs normal except BP     Exam:  Constitutional: healthy, alert, no distress, and cooperative  Head: Normocephalic. No masses, lesions, tenderness or abnormalities  Neck: Neck supple. No adenopathy.  ENT: throat normal without erythema or exudate  Cardiovascular: RRR w/o audible murmur  Respiratory: bilateral clear lungs w/o wheezing, crackles or rhonchi; breathing comfortably on RA  Gastrointestinal: Abdomen soft, non-tender. BS normal.  : Deferred  Musculoskeletal: extremities normal- no gross deformities noted.  Pinpoint open area on the distal phalanx of the left middle finger, no sign of bleeding or erythema.  Skin: no suspicious lesions or rashes  Neurologic: grossly normal CN; normal strength, sensation, & tone  Psychiatric: mentation appears normal and affect normal/bright        Results:    Results from this visitNo results found for any visits on 02/16/22.

## 2022-02-28 DIAGNOSIS — J30.2 SEASONAL ALLERGIC RHINITIS, UNSPECIFIED TRIGGER: ICD-10-CM

## 2022-02-28 RX ORDER — FLUTICASONE PROPIONATE 50 MCG
SPRAY, SUSPENSION (ML) NASAL
Qty: 48 ML | Refills: 2 | Status: SHIPPED | OUTPATIENT
Start: 2022-02-28 | End: 2023-02-07

## 2022-03-21 DIAGNOSIS — M81.8 OTHER OSTEOPOROSIS WITHOUT CURRENT PATHOLOGICAL FRACTURE: ICD-10-CM

## 2022-03-22 RX ORDER — ALENDRONATE SODIUM 70 MG/1
TABLET ORAL
Qty: 12 TABLET | Refills: 3 | Status: SHIPPED | OUTPATIENT
Start: 2022-03-22 | End: 2022-12-26

## 2022-04-14 DIAGNOSIS — J44.9 CHRONIC OBSTRUCTIVE PULMONARY DISEASE, UNSPECIFIED COPD TYPE (H): ICD-10-CM

## 2022-04-18 RX ORDER — FLUTICASONE PROPIONATE 220 UG/1
AEROSOL, METERED RESPIRATORY (INHALATION)
Qty: 12 G | Refills: 1 | Status: SHIPPED | OUTPATIENT
Start: 2022-04-18 | End: 2022-06-27

## 2022-06-26 DIAGNOSIS — J44.9 CHRONIC OBSTRUCTIVE PULMONARY DISEASE, UNSPECIFIED COPD TYPE (H): ICD-10-CM

## 2022-06-27 RX ORDER — FLUTICASONE PROPIONATE 220 UG/1
AEROSOL, METERED RESPIRATORY (INHALATION)
Qty: 12 G | Refills: 3 | Status: SHIPPED | OUTPATIENT
Start: 2022-06-27 | End: 2022-10-06

## 2022-06-29 ENCOUNTER — OFFICE VISIT (OUTPATIENT)
Dept: FAMILY MEDICINE | Facility: CLINIC | Age: 77
End: 2022-06-29
Payer: COMMERCIAL

## 2022-06-29 VITALS
DIASTOLIC BLOOD PRESSURE: 75 MMHG | HEIGHT: 62 IN | RESPIRATION RATE: 16 BRPM | SYSTOLIC BLOOD PRESSURE: 131 MMHG | OXYGEN SATURATION: 97 % | WEIGHT: 105.6 LBS | BODY MASS INDEX: 19.43 KG/M2 | TEMPERATURE: 98.7 F | HEART RATE: 79 BPM

## 2022-06-29 DIAGNOSIS — Z00.00 ENCOUNTER FOR MEDICARE ANNUAL WELLNESS EXAM: Primary | ICD-10-CM

## 2022-06-29 DIAGNOSIS — J45.40 MODERATE PERSISTENT ASTHMA WITHOUT COMPLICATION: ICD-10-CM

## 2022-06-29 DIAGNOSIS — M81.8 OTHER OSTEOPOROSIS WITHOUT CURRENT PATHOLOGICAL FRACTURE: ICD-10-CM

## 2022-06-29 DIAGNOSIS — Z12.11 SCREEN FOR COLON CANCER: ICD-10-CM

## 2022-06-29 PROCEDURE — G0439 PPPS, SUBSEQ VISIT: HCPCS | Mod: GC | Performed by: STUDENT IN AN ORGANIZED HEALTH CARE EDUCATION/TRAINING PROGRAM

## 2022-06-29 PROCEDURE — 90746 HEPB VACCINE 3 DOSE ADULT IM: CPT | Performed by: STUDENT IN AN ORGANIZED HEALTH CARE EDUCATION/TRAINING PROGRAM

## 2022-06-29 PROCEDURE — 91305 COVID-19,PF,PFIZER (12+ YRS): CPT | Performed by: STUDENT IN AN ORGANIZED HEALTH CARE EDUCATION/TRAINING PROGRAM

## 2022-06-29 PROCEDURE — G0010 ADMIN HEPATITIS B VACCINE: HCPCS | Performed by: STUDENT IN AN ORGANIZED HEALTH CARE EDUCATION/TRAINING PROGRAM

## 2022-06-29 PROCEDURE — 0054A COVID-19,PF,PFIZER (12+ YRS): CPT | Performed by: STUDENT IN AN ORGANIZED HEALTH CARE EDUCATION/TRAINING PROGRAM

## 2022-06-29 RX ORDER — BUDESONIDE AND FORMOTEROL FUMARATE DIHYDRATE 80; 4.5 UG/1; UG/1
2 AEROSOL RESPIRATORY (INHALATION) 2 TIMES DAILY
Qty: 20.4 G | Refills: 4 | Status: SHIPPED | OUTPATIENT
Start: 2022-06-29 | End: 2022-06-29

## 2022-06-29 ASSESSMENT — ENCOUNTER SYMPTOMS
SORE THROAT: 0
PARESTHESIAS: 0
DYSURIA: 0
HEARTBURN: 0
HEMATOCHEZIA: 0
CONSTIPATION: 0
SHORTNESS OF BREATH: 1
CHILLS: 0
WEAKNESS: 1
FREQUENCY: 0
HEADACHES: 1
JOINT SWELLING: 1
MYALGIAS: 1
ARTHRALGIAS: 1
NERVOUS/ANXIOUS: 0
FEVER: 0
NAUSEA: 0
EYE PAIN: 1
DIZZINESS: 1
DIARRHEA: 0
ABDOMINAL PAIN: 0
COUGH: 1
PALPITATIONS: 0
HEMATURIA: 0

## 2022-06-29 ASSESSMENT — ACTIVITIES OF DAILY LIVING (ADL)
CURRENT_FUNCTION: HOUSEWORK REQUIRES ASSISTANCE
CURRENT_FUNCTION: SHOPPING REQUIRES ASSISTANCE
CURRENT_FUNCTION: TRANSPORTATION REQUIRES ASSISTANCE
CURRENT_FUNCTION: PREPARING MEALS REQUIRES ASSISTANCE
CURRENT_FUNCTION: BATHING REQUIRES ASSISTANCE
CURRENT_FUNCTION: LAUNDRY REQUIRES ASSISTANCE

## 2022-06-29 ASSESSMENT — PATIENT HEALTH QUESTIONNAIRE - PHQ9
SUM OF ALL RESPONSES TO PHQ QUESTIONS 1-9: 16
SUM OF ALL RESPONSES TO PHQ QUESTIONS 1-9: 16
10. IF YOU CHECKED OFF ANY PROBLEMS, HOW DIFFICULT HAVE THESE PROBLEMS MADE IT FOR YOU TO DO YOUR WORK, TAKE CARE OF THINGS AT HOME, OR GET ALONG WITH OTHER PEOPLE: EXTREMELY DIFFICULT

## 2022-06-29 NOTE — NURSING NOTE
Due to patient being non-English speaking/uses sign language, an  was used for this visit. Only for face-to-face interpretation by an external agency, date and length of interpretation can be found on the scanned worksheet.     name: Jonathan Velez  Agency: [employer]  Language: Russian   Telephone number:   Type of interpretation: Face-to-face, spoken

## 2022-06-29 NOTE — PATIENT INSTRUCTIONS
Personalized Prevention Plan  You are due for the preventive services outlined below.  Your care team is available to assist you in scheduling these services.  If you have already completed any of these items, please share that information with your care team to update in your medical record.  Health Maintenance Due   Topic Date Due     Discuss Advance Care Planning  Never done     Zoster (Shingles) Vaccine (1 of 2) Never done     Mammogram  05/14/2017     Colorectal Cancer Screening  07/09/2020     COVID-19 Vaccine (3 - Booster for Vipin series) 03/02/2022     Preventive Health Recommendations    See your health care provider every year to    Review health changes.     Discuss preventive care.      Review your medicines if your doctor has prescribed any.    You no longer need a yearly Pap test unless you've had an abnormal Pap test in the past 10 years. If you have vaginal symptoms, such as bleeding or discharge, be sure to talk with your provider about a Pap test.    Every 1 to 2 years, have a mammogram.  If you are over 69, talk with your health care provider about whether or not you want to continue having screening mammograms.    Every 10 years, have a colonoscopy. Or, have a yearly FIT test (stool test). These exams will check for colon cancer.     Have a cholesterol test every 5 years, or more often if your doctor advises it.     Have a diabetes test (fasting glucose) every three years. If you are at risk for diabetes, you should have this test more often.     At age 65, have a bone density scan (DEXA) to check for osteoporosis (brittle bone disease).    Shots:    Get a flu shot each year.    Get a tetanus shot every 10 years.    Talk to your doctor about your pneumonia vaccines. There are now two you should receive - Pneumovax (PPSV 23) and Prevnar (PCV 13).    Talk to your pharmacist about the shingles vaccine.    Talk to your doctor about the hepatitis B vaccine.    Nutrition:     Eat at least 5  servings of fruits and vegetables each day.    Eat whole-grain bread, whole-wheat pasta and brown rice instead of white grains and rice.    Get adequate Calcium and Vitamin D.     Lifestyle    Exercise at least 150 minutes a week (30 minutes a day, 5 days a week). This will help you control your weight and prevent disease.    Limit alcohol to one drink per day.    No smoking.     Wear sunscreen to prevent skin cancer.     See your dentist twice a year for an exam and cleaning.    See your eye doctor every 1 to 2 years to screen for conditions such as glaucoma, macular degeneration and cataracts.    Personalized Prevention Plan  You are due for the preventive services outlined below.  Your care team is available to assist you in scheduling these services.  If you have already completed any of these items, please share that information with your care team to update in your medical record.  Health Maintenance   Topic Date Due     ADVANCE CARE PLANNING  Never done     ZOSTER IMMUNIZATION (1 of 2) Never done     MAMMO SCREENING  05/14/2017     COLORECTAL CANCER SCREENING  07/09/2020     COVID-19 Vaccine (3 - Booster for Vipin series) 03/02/2022     FALL RISK ASSESSMENT  11/16/2022     MEDICARE ANNUAL WELLNESS VISIT  06/29/2023     LIPID  06/25/2024     DTAP/TDAP/TD IMMUNIZATION (3 - Td or Tdap) 05/17/2027     DEXA  05/09/2031     SPIROMETRY  Completed     HEPATITIS C SCREENING  Completed     PHQ-2 (once per calendar year)  Completed     INFLUENZA VACCINE  Completed     Pneumococcal Vaccine: 65+ Years  Completed     COPD ACTION PLAN  Addressed     IPV IMMUNIZATION  Aged Out     MENINGITIS IMMUNIZATION  Aged Out     HEPATITIS B IMMUNIZATION  Aged Out     Patient Education   Personalized Prevention Plan  You are due for the preventive services outlined below.  Your care team is available to assist you in scheduling these services.  If you have already completed any of these items, please share that information with your  care team to update in your medical record.  Health Maintenance Due   Topic Date Due     Discuss Advance Care Planning  Never done     Zoster (Shingles) Vaccine (1 of 2) Never done     Mammogram  05/14/2017     Colorectal Cancer Screening  07/09/2020     COVID-19 Vaccine (3 - Booster for Vipin series) 03/02/2022

## 2022-06-29 NOTE — PROGRESS NOTES
Colon Cancer Screening: FIT Negative 2019; due for screening today.  Breast Cancer Screening: Age 75 Not Indicated.  Immunizations: Due for Covid19 booster, Hep B, Shingles

## 2022-06-29 NOTE — PROGRESS NOTES
Preceptor Attestation:    I discussed the patient with the resident and evaluated the patient in person. I have verified the content of the note, which accurately reflects my assessment of the patient and the plan of care.   Supervising Physician:  Grant Alfonso MD.

## 2022-06-29 NOTE — PROGRESS NOTES
Preceptor Attestation:    I discussed the patient with the resident and evaluated the patient in person. I have verified the content of the note, which accurately reflects my assessment of the patient and the plan of care.   Supervising Physician:  Chet Gibbs DO.

## 2022-06-29 NOTE — PROGRESS NOTES
"SUBJECTIVE:   Lara Frost is a 77 year old female who presents for Preventive Visit.    Patient has been advised of split billing requirements and indicates understanding: Yes  Are you in the first 12 months of your Medicare coverage?  No    Healthy Habits:     In general, how would you rate your overall health?  Poor    Frequency of exercise:  4-5 days/week    Duration of exercise:  Less than 15 minutes    Do you usually eat at least 4 servings of fruit and vegetables a day, include whole grains    & fiber and avoid regularly eating high fat or \"junk\" foods?  Yes    Taking medications regularly:  Yes    Medication side effects:  None    Ability to successfully perform activities of daily living:  Transportation requires assistance, shopping requires assistance, preparing meals requires assistance, housework requires assistance, bathing requires assistance and laundry requires assistance    Home Safety:  No safety concerns identified    Hearing Impairment:  No hearing concerns    In the past 6 months, have you been bothered by leaking of urine?  No    In general, how would you rate your overall mental or emotional health?  Fair      PHQ-2 Total Score: 5    Additional concerns today:  Yes    Do you feel safe in your environment? Yes  Have you ever done Advance Care Planning? (For example, a Health Directive, POLST, or a discussion with a medical provider or your loved ones about your wishes): No, advance care planning information given to patient to review.  Patient plans to discuss their wishes with loved ones or provider.    Hearing Assessment: normal     Fall risk  Fallen 2 or more times in the past year?: Yes  Any fall with injury in the past year?: Yes (minor injury)  Timed Up and Go Test (>13.5 is fall risk; contact physician) : 37.72 (having leg pain and feel one leg id bigger than the other.)   She has fallen in the last year, d/t leg weakness. She uses a cane.     Cognitive Screening   1) Repeat 3 items " "(Pen, School, Glasses) - Able to repeat  2) Clock draw: NORMAL  3) 3 item recall: Recalls 3 objects  Results: 3 items recalled: COGNITIVE IMPAIRMENT LESS LIKELY     Patient feels her \"long-term memory\" is not so good.     Mini-CogTM Copyright CHAPIN Gonzalez. Licensed by the author for use in [facility]; reprinted with permission (susan@[email or web address]). All rights reserved.      Do you have sleep apnea, excessive snoring or daytime drowsiness?: no    Reviewed and updated as needed this visit by clinical staff   Tobacco  Allergies                 Reviewed and updated as needed this visit by Provider                   Social History     Tobacco Use     Smoking status: Never Smoker     Smokeless tobacco: Never Used   Substance Use Topics     Alcohol use: No       PROBLEMS TO ADD ON...  1. Chest pain / shortness of breath - chest pain is worse in the winter and when she has asthma. 2014 PFTs show restrictive patter. 2016 complete PFTs show obstructive component. Uses flovent (twice daily) and albuterol (as needed).     2. Wants a medicine for her knee and hip pain. Already takes tylenol and uses icy hot. She does exercises every day.     3. Mood - only sad when she has pain. Not anxious.    Current providers sharing in care for this patient include:   Patient Care Team:  Carlos A Bates MD as PCP - General (Family Practice)  Carlos A Bates MD as Assigned PCP    The following health maintenance items are reviewed in Epic and correct as of today:  Health Maintenance Due   Topic Date Due     ZOSTER IMMUNIZATION (1 of 2) Never done     MAMMO SCREENING  05/14/2017     COLORECTAL CANCER SCREENING  07/09/2020     Patient Active Problem List   Diagnosis     Sebaceous cyst     Edema of larynx     Eczema     Tear film insufficiency     Disease of lung     Chronic rhinitis     Restrictive lung disease     Osteoporosis without pathological fracture     Near syncope     Past Surgical History:   Procedure Laterality Date     NO PAST " SURGERIES         Social History     Tobacco Use     Smoking status: Never Smoker     Smokeless tobacco: Never Used   Substance Use Topics     Alcohol use: No     Family History   Problem Relation Age of Onset     No Known Problems Mother      No Known Problems Father      No Known Problems Maternal Grandmother      No Known Problems Maternal Grandfather      No Known Problems Paternal Grandmother      No Known Problems Paternal Grandfather      No Known Problems Brother      No Known Problems Sister      No Known Problems Son      No Known Problems Daughter      No Known Problems Maternal Half-Brother      No Known Problems Maternal Half-Sister      No Known Problems Paternal Half-Brother      No Known Problems Paternal Half-Sister      No Known Problems Niece      No Known Problems Nephew      No Known Problems Cousin      No Known Problems Other      Diabetes No family hx of      Coronary Artery Disease No family hx of      Hypertension No family hx of      Hyperlipidemia No family hx of      Breast Cancer No family hx of      Cancer - colorectal No family hx of      Ovarian Cancer No family hx of      Prostate Cancer No family hx of      Other Cancer No family hx of      Mental Illness No family hx of      Cerebrovascular Disease No family hx of      Anesthesia Reaction No family hx of      Asthma No family hx of      Osteoporosis No family hx of      Known Genetic Syndrome No family hx of      Obesity No family hx of      Unknown/Adopted No family hx of      Colon Cancer No family hx of      Depression No family hx of      Anxiety Disorder No family hx of      Mental Illness No family hx of      Substance Abuse No family hx of      Genetic Disorder No family hx of      Thyroid Disease No family hx of      No Known Problems Sister      No Known Problems Daughter      No Known Problems Maternal Aunt      No Known Problems Paternal Aunt      Hereditary Breast and Ovarian Cancer Syndrome No family hx of      Cancer  No family hx of      Endometrial Cancer No family hx of          Current Outpatient Medications   Medication Sig Dispense Refill     alendronate (FOSAMAX) 70 MG tablet TAKE 1 TAB EVERY 7 DAYS 60 MIN BEFORE MORNING MEAL WITH 8OZ WATER & REMAIN UPRIGHT FOR 30 MIN. 12 tablet 3     diclofenac (VOLTAREN) 1 % topical gel Apply 4 g topically 4 times daily 350 g 3     naproxen (NAPROSYN) 375 MG tablet Take 1 tablet (375 mg) by mouth 2 times daily (with meals) 29 tablet 0     albuterol (PROAIR HFA/PROVENTIL HFA/VENTOLIN HFA) 108 (90 Base) MCG/ACT inhaler INHALE 2 PUFFS BY MOUTH EVERY 6 HOURS AS NEEDED FOR SHORTNESS OF BREATH, DYSPNEA, OR WHEEZING. 18 g 3     calcium carbonate-vitamin D (CALTRATE 600+D) 600-400 MG-UNIT CHEW Take 1 chew tab by mouth 2 times daily (Patient not taking: Reported on 11/5/2019) 180 tablet 3     cetirizine (ZYRTEC) 10 MG tablet Take 1 tablet (10 mg) by mouth every evening (Patient not taking: Reported on 11/5/2019) 90 tablet 1     Cholecalciferol (VITAMIN D) 2000 UNITS tablet Take 2,000 Units by mouth daily (Patient not taking: Reported on 11/2/2021) 100 tablet 3     FLOVENT  MCG/ACT inhaler TAKE 2 PUFFS BY MOUTH TWICE A DAY 12 g 3     fluticasone (FLONASE) 50 MCG/ACT nasal spray SHAKE LIQUID AND USE 1 TO 2 SPRAYS IN EACH NOSTRIL DAILY 48 mL 2     guaiFENesin (MUCINEX) 600 MG 12 hr tablet Take 2 tablets (1,200 mg) by mouth 2 times daily (Patient not taking: Reported on 2/16/2022) 60 tablet 0     meclizine (ANTIVERT) 25 MG tablet TAKE 1 TABLET BY MOUTH TWICE DAILY AS NEEDED FOR DIZZINESS 30 tablet 1     omega 3 1000 MG CAPS Take 1,000 mg by mouth Once a day       sodium chloride ([employer] SALINE NASAL SPRAY) 0.65 % nasal spray Spray 1 spray into both nostrils daily as needed for congestion (Patient not taking: Reported on 11/5/2019) 30 mL 1     sulfacetamide (BLEPH-10) 10 % ophthalmic solution Place 1-2 drops Into the left eye every 2 hours (while awake) (Patient not taking: Reported on 11/16/2021)  "10 mL 0     triamcinolone (KENALOG) 0.025 % external ointment APPLY TO AFFECTED AREA TWICE A DAY 15 g 1     Allergies   Allergen Reactions     Pollen Extract      Allergic to pollen from a \"[place] leaf\" tree.     Seasonal Allergies Itching     Mammogram Screening: Mammogram Screening - Mammography discussed and declined   Colon cancer screening: Will do FIT today.  Nonsmoker    Review of Systems   Constitutional: Negative for chills and fever.   HENT: Positive for congestion. Negative for ear pain, hearing loss and sore throat.    Eyes: Positive for pain and visual disturbance.   Respiratory: Positive for cough and shortness of breath.    Cardiovascular: Positive for chest pain. Negative for palpitations and peripheral edema.   Gastrointestinal: Negative for abdominal pain, constipation, diarrhea, heartburn, hematochezia and nausea.   Genitourinary: Negative for dysuria, frequency, genital sores, hematuria and urgency.   Musculoskeletal: Positive for arthralgias, joint swelling and myalgias.   Skin: Positive for rash.   Neurological: Positive for dizziness, weakness and headaches. Negative for paresthesias.   Psychiatric/Behavioral: Negative for mood changes. The patient is not nervous/anxious.        OBJECTIVE:   /75   Pulse 79   Temp 98.7  F (37.1  C) (Oral)   Resp 16   Ht 1.574 m (5' 1.97\")   Wt 47.9 kg (105 lb 9.6 oz)   SpO2 97%   BMI 19.33 kg/m   Estimated body mass index is 19.33 kg/m  as calculated from the following:    Height as of this encounter: 1.574 m (5' 1.97\").    Weight as of this encounter: 47.9 kg (105 lb 9.6 oz).  Physical Exam  GENERAL APPEARANCE: healthy, alert and no distress  EYES: Eyes grossly normal to inspection, PERRL and conjunctivae and sclerae normal  HENT: ear canals and TM's normal, nose and mouth without ulcers or lesions, oropharynx clear and oral mucous membranes moist  NECK: no adenopathy, no asymmetry, masses, or scars and thyroid normal to palpation  RESP: lungs " clear to auscultation - no rales, rhonchi or wheezes  BREAST: Not assessed  CV: regular rate and rhythm, normal S1 S2, no S3 or S4, no murmur, click or rub, no peripheral edema and peripheral pulses strong  ABDOMEN: soft, nontender, no hepatosplenomegaly, no masses and bowel sounds normal  MS: RLE 4-/5, LLE normal. Rest of strength exam is normal. Walks with a cane.  SKIN: no suspicious lesions or rashes  NEURO: Normal strength and tone, sensory exam grossly normal, mentation intact and speech normal  PSYCH: mentation appears normal and affect normal/bright      ASSESSMENT / PLAN:   Lara was seen today for wellness visit and pain.    Diagnoses and all orders for this visit:    Encounter for Medicare annual wellness exam    Screen for colon cancer  -     Fecal colorectal cancer screen FIT - Future (S+30); Future  -     Fecal colorectal cancer screen FIT - Future (S+30)    Other osteoporosis without current pathological fracture  -     diclofenac (VOLTAREN) 1 % topical gel; Apply 4 g topically 4 times daily    Moderate persistent asthma without complication  Not clearly asthma based on recent PFTs (obstructive, but no change post-bronchodilator). In 2014, her PFTs showed restrictive pattern. Will have her see pulmonology to further assess, adjust treatment.   -     Adult Pulmonary Medicine Referral; Future    Other orders  -     COVID-19,PF,PFIZER (12+ YRS)  -     HEPATITIS B VACCINE,ADULT,IM      Patient has been advised of split billing requirements and indicates understanding: Yes    COUNSELING:  Reviewed preventive health counseling, as reflected in patient instructions  Special attention given to:       Regular exercise       Healthy diet/nutrition       Vision screening       Hearing screening       Dental care       Fall risk prevention       Immunizations    Vaccinated for: Hepatitis B (*#3) and Covid19 booster today    She will go to [facility] to get her Shingles vaccine           Osteoporosis prevention/bone  "health       Colon cancer screening       Advanced Planning     Estimated body mass index is 19.33 kg/m  as calculated from the following:    Height as of this encounter: 1.574 m (5' 1.97\").    Weight as of this encounter: 47.9 kg (105 lb 9.6 oz).    Weight management plan noted, stable and monitoring    She reports that she has never smoked. She has never used smokeless tobacco.      Appropriate preventive services were discussed with this patient, including applicable screening as appropriate for cardiovascular disease, diabetes, osteopenia/osteoporosis, and glaucoma.  As appropriate for age/gender, discussed screening for colorectal cancer, prostate cancer, breast cancer, and cervical cancer. Checklist reviewing preventive services available has been given to the patient.    Reviewed patients plan of care and provided an AVS. The Basic Care Plan (routine screening as documented in Health Maintenance) and Intermediate Care Plan ( asthma action plan, low back pain action plan, and migraine action plan) for Lara meets the Care Plan requirement. This Care Plan has been established and reviewed with the Patient and son.    Counseling Resources:  ATP IV Guidelines  Pooled Cohorts Equation Calculator  Breast Cancer Risk Calculator  Breast Cancer: Medication to Reduce Risk  FRAX Risk Assessment  ICSI Preventive Guidelines  Dietary Guidelines for Americans, 2010  [employer] MyPlate  ASA Prophylaxis  Lung CA Screening      Discussed with Dr. Gibbs.    Ceci Stahl MD PGY3  [facility]    Identified Health Risks:  Answers for HPI/ROS submitted by the patient on 6/29/2022  If you checked off any problems, how difficult have these problems made it for you to do your work, take care of things at home, or get along with other people?: Extremely difficult  PHQ9 TOTAL SCORE: 16      "

## 2022-06-30 DIAGNOSIS — J45.909 ASTHMA: Primary | ICD-10-CM

## 2022-07-06 PROCEDURE — 82274 ASSAY TEST FOR BLOOD FECAL: CPT | Performed by: STUDENT IN AN ORGANIZED HEALTH CARE EDUCATION/TRAINING PROGRAM

## 2022-07-08 LAB — HEMOCCULT STL QL IA: POSITIVE

## 2022-07-13 ENCOUNTER — OFFICE VISIT (OUTPATIENT)
Dept: FAMILY MEDICINE | Facility: CLINIC | Age: 77
End: 2022-07-13
Payer: COMMERCIAL

## 2022-07-13 VITALS
HEART RATE: 89 BPM | BODY MASS INDEX: 19.59 KG/M2 | DIASTOLIC BLOOD PRESSURE: 77 MMHG | SYSTOLIC BLOOD PRESSURE: 132 MMHG | OXYGEN SATURATION: 95 % | WEIGHT: 107 LBS | TEMPERATURE: 98.7 F | RESPIRATION RATE: 18 BRPM

## 2022-07-13 DIAGNOSIS — R19.5 POSITIVE FIT (FECAL IMMUNOCHEMICAL TEST): ICD-10-CM

## 2022-07-13 DIAGNOSIS — R29.898 RIGHT LEG WEAKNESS: Primary | ICD-10-CM

## 2022-07-13 DIAGNOSIS — M79.661 PAIN OF RIGHT LOWER LEG: ICD-10-CM

## 2022-07-13 PROCEDURE — 99214 OFFICE O/P EST MOD 30 MIN: CPT | Performed by: FAMILY MEDICINE

## 2022-07-13 RX ORDER — ACETAMINOPHEN 500 MG
500 TABLET ORAL EVERY 8 HOURS PRN
Qty: 120 TABLET | Refills: 1 | Status: SHIPPED | OUTPATIENT
Start: 2022-07-13 | End: 2024-01-03

## 2022-07-13 NOTE — PROGRESS NOTES
Assessment & Plan   1 Bilateral leg pain/weakness:  Needs PT but declines She will do home self  PT     2 Positive FIT     Patient Instructions   - Please continue doing home exercises.   - We will hold on doing home physical therapy for now.  - Start tylenol as prescribed   - Continue applying Voltaren gel as ordered  - Colonoscopy referral is done, your son should be called for further instructions (Rema - [phone]). Call them to schedule if they do not call you within 1 week.   - Schedule follow up with me 1-2 weeks after the colonoscopy to discuss results    Carlos A Bates MD  [facility]    Bryce Bermudez is a 77 year old female presenting for the following health issues:  Leg Pain (Bilateral leg weakness and pain/) and Colonoscopy (Talk about positive stool)    HPI   Right leg pain - starts in hip and radiates down to knee, had chronic pain that has gotten worse in past 3 weeks, worst with walking    Numbness r>l   Right first toe gets stuck   Right feels weaker than left   Has taken naproxen and tylenol    Review of Systems   Constitutional, HEENT, cardiovascular, pulmonary, gi and gu systems are negative, except as otherwise noted.      Objective    /77 (BP Location: Right arm, Patient Position: Sitting, Cuff Size: Adult Regular)   Pulse 89   Temp 98.7  F (37.1  C) (Tympanic)   Resp 18   Wt 48.5 kg (107 lb)   SpO2 95%   BMI 19.59 kg/m    Body mass index is 19.59 kg/m .  Physical Exam   GENERAL: healthy, alert and no distress  RESP: no increased work of breathing  CV: no peripheral edema   MS: no gross musculoskeletal defects noted, no edema, gait intact  NEURO: normal strength and tone, mentation intact and speech normal  PSYCH: affect normal

## 2022-07-13 NOTE — PATIENT INSTRUCTIONS
- Please continue doing home exercises.   - We will hold on doing home physical therapy for now.  - Start tylenol as prescribed   - Continue applying Voltaren gel as ordered  - Colonoscopy referral is done, your son should be called for further instructions (Rema - [phone]). Call them to schedule if they do not call you within 1 week.   - Schedule follow up with me 1-2 weeks after the colonoscopy to discuss results            07/14/22    [address], [address], [place], [address]  Phone: [phone]  Fax: [phone]    Faxed demographics and referral to [phone]. They will contact pt to schedule.     Dilia Parker

## 2022-07-13 NOTE — NURSING NOTE
Due to patient being non-English speaking/uses sign language, an  was used for this visit. Only for face-to-face interpretation by an external agency, date and length of interpretation can be found on the scanned worksheet.     name: Jonathan  Agency: [employer]  Language: Spanish   Telephone number:   Type of interpretation: Face-to-face, spoken

## 2022-07-19 ENCOUNTER — TELEPHONE (OUTPATIENT)
Dept: GASTROENTEROLOGY | Facility: CLINIC | Age: 77
End: 2022-07-19

## 2022-07-19 ENCOUNTER — TELEPHONE (OUTPATIENT)
Dept: FAMILY MEDICINE | Facility: CLINIC | Age: 77
End: 2022-07-19

## 2022-07-19 NOTE — TELEPHONE ENCOUNTER
Chart reviewed. GI intake form partially completed. Possibly some confusion with language barrier. Called GI clinic, they will have  call back.    DAVID RiveraN, RN, PHN, CHFN, HNB-BC   7/19/2022 at 12:31 PM

## 2022-07-19 NOTE — TELEPHONE ENCOUNTER
Screening Questions    BlueKIND OF PREP RedLOCATION [review exclusion criteria] GreenSEDATION TYPE    1. Are you active on mychart? N    2. What insurance is in the chart? [employer]    3.   Ordering/Referring Provider: IDROGO    **(Sedation review/consideration needed)**  Do you have a legal guardian or Medical Power of    and/or are you able to give consent for your medical care?     Y    4. BMI   (If greater than 40 review exclusion criteria [PAC APPT IF [MAC] @ U)  19.6  [If yes, BMI OVER 40-EXTENDED PREP]    5. Have you had a positive covid test in the last 90 days?   N -     6.  Are you currently on dialysis?    N[ If yes, G-PREP & HOSPITAL setting ONLY]     7.  Do you have chronic kidney disease?  N [ If yes, G-PREP ]    8.   Do you have a diagnosis of diabetes?   N   [ If yes, G-PREP ]    9.  On a regular basis do you go 3-5 days between bowel movements?     N [ If yes, EXTENDED PREP]    10.  Are you taking any prescription pain medications on a routine schedule?    N -  [ If yes, EXTENDED PREP] [If yes, MAC]      11.   Do you have any chemical dependencies such as alcohol, street drugs, or methadone?    N [If yes, MAC]    12.   Do you have any history of post-traumatic stress syndrome, severe anxiety or history of psychosis?     N[If yes, MAC]    13.  [FEMALES] Are you currently pregnant?     If yes, how many weeks?       Respiratory/Heart Screening:  [If yes to any of the following HOSPITAL setting only]     14. Do you use daily home oxygen?  N       15.  Do you have mod to severe Obstructive Sleep Apnea?         (OKAY @ [facility]  [facility]  SH  PH  RI  MG - if pt is not on OXYGEN)  N     16.  Do you have Pulmonary Hypertension [Lungs]?   N      17.  Do you have UNCONTROLLED asthma?  N     18.   Have you had a heart or lung transplant? N        19.   In the past 6 months have you had a stroke or Transient ischemic attack (TIA - aka  mini stroke ) ?  (If yes, please review exclusion criteria)    N    20.   Have you had any heart related issues including cardiomyopathy or heart attack within 6 months? N             If yes, did it require cardiac stenting or other implantable device?         21.   Do you have any implantable devices in your body (pacemaker, defib, LVAD)? (If yes, please review exclusion criteria) N       22. Do you take nitroglycerin? N             If yes, how often?   (if yes, HOSPITAL setting ONLY)    23.  Are you currently taking any blood thinners?  N  [If yes, INFORM patient to follw up w/ ORDERING PROVIDER FOR BRIDGING INSTRUCTIONS]         24.   Do you transfer independently?                (If NO, please HOSPITAL setting ONLY) Y      25.   Preferred LOCAL Pharmacy for Pre Prescription:         [email or web address] - [place], [place] - [address] [id number] - [place] - [address] [id number] - [place], [place] [address] [facility] [address]  [id number] - [place], [place] - [address] [facility] [address]    Scheduling Details  (Please ask for phone number if not scheduled by patient)      Caller : Rema (son)  Date of Procedure:   Surgeon:   Location:         Sedation Type:    Conscious Sedation- Needs  for 6 hours after the procedure  MAC/General-Needs  for 24 hours after procedure     :[Pre-op Required] at [facility]  SH  MG and OR for MAC sedation   (advise patient they will need a pre-op WITH IN 30 DAYS of procedure date)     Type of Procedure Scheduled:   Lower Endoscopy [Colonoscopy]    Which Colonoscopy Prep was Sent?:        GIOVANNI CF PATIENTS & GROEN'S PATIENTS NEEDS EXTENDED PREP       Informed patient they will need an adult    Cannot take any type of public or medical transportation alone    Pre-Procedure Covid test to be completed at [facility] or Externally:   **INFORMED OF HOME TESTING & LAB OPTION**        Confirmed Nurse will call to complete assessment      Additional comments:      SCREENING COMPLETE, VM VIA  FOR THE PATIENT

## 2022-07-19 NOTE — TELEPHONE ENCOUNTER
[facility] phone call message- general phone call:    Reason for call: Pt's son - Mickey said that he tried to schedule a GI appt but the clinic he was trying to schedule it with said the pt can't be scheduled due to her asthma.     Return call needed: Yes    OK to leave a message on voice mail? Yes    Primary language: Grenadian      needed? Yes    Call taken on July 19, 2022 at 11:49 AM by Viola Valle

## 2022-07-19 NOTE — TELEPHONE ENCOUNTER
Spoke with  at GI clinic and assisted with answering screening questions. She states she will call pt's son back to schedule appt. Attempted to call son Mickey with [facility] [place] . No answer. Will re-attempt.     DAVID RiveraN, RN, PHN, CHFN, HNB-BC   7/19/2022 at 2:00 PM

## 2022-07-20 ENCOUNTER — TELEPHONE (OUTPATIENT)
Dept: GASTROENTEROLOGY | Facility: CLINIC | Age: 77
End: 2022-07-20

## 2022-07-20 NOTE — TELEPHONE ENCOUNTER
2nd attempt to call pt. No answer, left detailed VM (assisted by Bengali ) with update and phone number for GI clinic. Will close encounter.     DAVID RiveraN, RN, PHN, CHFN, HNB-BC   7/20/2022 at 12:17 PM

## 2022-07-20 NOTE — TELEPHONE ENCOUNTER
Screening Questions    BlueKIND OF PREP RedLOCATION [review exclusion criteria] GreenSEDATION TYPE      1. Are you active on mychart? N    2. What insurance is in the chart? HP     3.   Ordering/Referring Provider: Carlos A Bates MD     4. BMI   (If greater than 40 review exclusion criteria [PAC APPT IF [MAC] @ [facility])  20.2  [If yes, BMI OVER 40-EXTENDED PREP]      **(Sedation review/consideration needed)**  Do you have a legal guardian or Medical Power of    and/or are you able to give consent for your medical care?     Y    5. Have you had a positive covid test in the last 90 days?   N -     6.  Are you currently on dialysis?   N [ If yes, G-PREP & HOSPITAL setting ONLY]     7.  Do you have chronic kidney disease?  N [ If yes, G-PREP ]    8.   Do you have a diagnosis of diabetes?   N   [ If yes, G-PREP ]    9.  On a regular basis do you go 3-5 days between bowel movements?   N   [ If yes, EXTENDED PREP]    10.  Are you taking any prescription pain medications on a routine schedule?    N -  [ If yes, EXTENDED PREP] [If yes, MAC]      11.   Do you have any chemical dependencies such as alcohol, street drugs, or methadone?    N [If yes, MAC]    12.   Do you have any history of post-traumatic stress syndrome, severe anxiety or history of psychosis?    N  [If yes, MAC]    13.  [FEMALES] Are you currently pregnant? NA    If yes, how many weeks?       Respiratory/Heart Screening:  [If yes to any of the following HOSPITAL setting only]     14. Do you have Pulmonary Hypertension [Lungs]?   N       15. Do you have UNCONTROLLED asthma?   N     16.  Do you use daily home oxygen?  N      17. Do you have mod to severe Obstructive Sleep Apnea?         (OKAY @ [facility]  [facility]  SH  PH  RI  MG - if pt is not on OXYGEN)  N      18.   Have you had a heart or lung transplant?   N      19.   Have you had a stroke or Transient ischemic attack (TIA - aka  mini stroke ) within 6 months?  (If yes, please review exclusion  criteria)  N     20.   In the past 6 months, have you had any heart related issues including cardiomyopathy or heart attack?   N           If yes, did it require cardiac stenting or other implantable device?   NA      21.   Do you have any implantable devices in your body (pacemaker, defib, LVAD)? (If yes, please review exclusion criteria)  N     22. Do you take nitroglycerin?   N           If yes, how often? NA  (if yes, HOSPITAL setting ONLY)    23.  Are you currently taking any blood thinners?    [If yes, INFORM patient to [facility] up w/ ORDERING PROVIDER FOR BRIDGING INSTRUCTIONS]     N    24.   Do you transfer independently?                (If NO, please HOSPITAL setting ONLY)  Y    25.   Preferred LOCAL Pharmacy for Pre Prescription:      [id number] - [place], [place] - [address] [place] [address]         Scheduling Details  (Please ask for phone number if not scheduled by patient)      Caller : Klaudia Palmah [phone]  Date of Procedure: 9/1  Surgeon: Lul  Location: [facility]        Sedation Type: CS l Per Protocol  Conscious Sedation- Needs  for 6 hours after the procedure  MAC/General-Needs  for 24 hours after procedure    N :[Pre-op Required] at [facility]  SH  MG and OR for MAC sedation   (advise patient they will need a pre-op WITH IN 30 DAYS of procedure date)     Type of Procedure Scheduled:   Lower Endoscopy [Colonoscopy]    Which Colonoscopy Prep was Sent?:   Miralax - Per Protocol    GIOVANNI CF PATIENTS & GROEN'S PATIENTS NEEDS EXTENDED PREP       Informed patient they will need an adult  Y  Cannot take any type of public or medical transportation alone    Pre-Procedure Covid test to be completed at [facility] or Externally: Y  **INFORMED OF HOME TESTING & LAB OPTION**        Confirmed Nurse will call to complete assessment Y    Additional comments:

## 2022-07-27 ENCOUNTER — DOCUMENTATION ONLY (OUTPATIENT)
Dept: FAMILY MEDICINE | Facility: CLINIC | Age: 77
End: 2022-07-27

## 2022-07-27 NOTE — PROGRESS NOTES
To be completed in Nursing note:    Please reference list for forms that require a visit for completion.  Please remind patients that providers are given 3-5 business days to complete and return forms.      Form type:  Prescription/Certificate of Medical Necessity    Date form received:  7/25/2022    Date form completed by Physician:  Sign by Dr. Alcantara     How was form returned to patient (mailed, faxed, or at  for patient to ):  Fax back at [phone]    Date form mailed/faxed/left at  for patient and sent to HIM for scanning:  Forms fax 8/1/2022      Once form is left for patient, faxed, or mailed PCS will then close the documentation only encounter.

## 2022-08-01 ENCOUNTER — MEDICAL CORRESPONDENCE (OUTPATIENT)
Dept: HEALTH INFORMATION MANAGEMENT | Facility: CLINIC | Age: 77
End: 2022-08-01

## 2022-08-01 NOTE — PROGRESS NOTES
They called back to check on the status of this.  The patient is in need of this.  Dr Bates is gone until the 12th. Can someone take care of this.

## 2022-08-22 ENCOUNTER — TELEPHONE (OUTPATIENT)
Dept: GASTROENTEROLOGY | Facility: CLINIC | Age: 77
End: 2022-08-22

## 2022-08-22 DIAGNOSIS — R19.5 POSITIVE FECAL IMMUNOCHEMICAL TEST: Primary | ICD-10-CM

## 2022-08-22 RX ORDER — BISACODYL 5 MG/1
TABLET, DELAYED RELEASE ORAL
Qty: 4 TABLET | Refills: 0 | Status: SHIPPED | OUTPATIENT
Start: 2022-08-22 | End: 2023-01-30

## 2022-08-22 NOTE — LETTER
August 22, 2022      Lara MIKEY Margot  [address]  [facility] [address]              Dear Lara,          Colonoscopy  Your exam is on 9/1/22    Please note that your procedure time may change  Check in at: [facility]; [address], [place] [address]    Please arrive with an adult who can drive you home after the exam and stay with you for the next 24 hours, unless your provider says otherwise.   The medicines used in the exam will make you sleepy. You will not be able to drive. You cannot take a medical cab, taxi, Uber, train or bus by yourself.      Covid Testing Requirements:     One to two days before your procedure, take an at-home, rapid antigen test. You can purchase these at many stores and pharmacies. You can order free, at-home tests from the [employer] government by visiting the website covid.gov/tests. If you are unable to find an at-home, rapid antigen test, schedule a PCR test with [employer] by calling [phone]. You can also schedule your PCR test by signing into or signing up for [employer] and completing a COVID-19 symptom check. Once complete, you will get a message sent to your home screen. Use that message to schedule your asymptomatic COVID-19 test. Make sure to choose only COVID-19 testing and indicate you are not symptomatic. Do not choose testing for multiple illnesses (COVID-19, influenza, strep, RSV).    We can t accept at-home, rapid antigen test results that are more than four days old.     If your test is negative, please take a photo of the test. Show the photo to the nurse when you come in for your procedure.     If your test is positive, please call [phone], option 2 to discuss rescheduling options.  You will not be able to complete procedure with a positive test result.      For questions or appointments, call:  [facility]: [phone], option 3  Monday through Friday, 8 a.m. to 4:30 p.m.  (If it is after hours  please reach out to the clinic or provider you were scheduled with.)    ----------------------------------------------------------------------------------------------------------------------------------------------------------------------------------    A magnesium citrate safety advisory alert has been initiated. Effective immediately magnesium citrate oral solution (lemon flavor) has been removed from stock at local pharmacies.  Due to limited availability of other flavors of magnesium citrate an alternative bowel prep, Golytely, is being recommended.      This bowel prep has been sent to [id number] - [place], [place] - [address] pharmacy and updated instructions are attached below.      STANDARD Golytely (Colyte, Nulytely)  Prep Instructions for your Colonoscopy      Please read these instructions carefully at least 7 days prior to your colonoscopy procedure. Be sure to follow all directions completely. The inside of your colon must be clean to allow for a complete examination for the presence of any growths, polyps, and/or abnormalities, as well as their biopsy or removal. A number of tips are included in order to make this part of the procedure as comfortable as possible.    Getting ready:     A nurse will call you approximately 1 week before your exam to prescribe your bowel prep and review the prep instructions with you.    You must arrange for an adult to drive you home after your exam. Your colonoscopy cannot be done unless you have a ride. If you need to use public transportation, someone must ride with you and stay with you for a minimum of 6-24 hours.    Check with your insurance company to be sure they will cover this exam.    7 days before the exam:    Talk to your doctor:  If you take blood-thinners (such as Coumadin, Plavix, Xarelto), your prescription or schedule may need to change before the test.    Stop taking fiber supplements, multi-vitamins with iron, and medicines that  contain iron.    Continue taking prescribed aspirin; talk to your prescribing doctor with any concerns.    Stop eating corn, nuts and foods with seeds.  These can stay in the colon for days.    If you have diabetes:  Ask to have your exam early in the morning.  Also, ask your doctor if you should change your diet or medicines.    3 days before the exam:    Begin a low-fiber diet: No raw fruits or vegetables, whole wheat, seeds, nuts, popcorn or other high-fiber foods (see list on page). No binding agents: (bran, Metamucil, Fibercon) and no Olestra (a fat substitute).    One day before the exam:    You can have a light, low-fiber breakfast. But drink only clear liquids after 9 a.m. (see list below). Drink at least 8 to 10 full glasses of clear liquids during the day.     Fill the jug that contains the Golytely powder with warm water. Cover and shake until well mixed. Use a full gallon of water. Chill for 3 hours, but do not add ice.    You will start drinking half of the Golytely solution at 6 p.m. You should drink the other half about 6 hours before your exam. So, the timing of Step 2 will depend on your exam time.     After you start drinking the solution, stay near a toilet. You may have watery stools (diarrhea), mild cramping, bloating , and nausea.     Step One:  o At 3 p.m., take 2 tablets of Dulcolax (bisacodyl).  o At 6 p.m. start drinking the Golytely solution. Drink an 8-ounce glass every 15 minutes until the jug is half empty. Drink each glass quickly.     Step Two:   If you arrive before 11 AM:  At 11 p.m. on the day before your exam:    Take 2 Dulcolax (Bisacodyl) tablets.     Start drinking the other half of the Golytely jug. Drink one 8-ounce glass every 15 minutes until the jug is empty. Drink each glass quickly.  If you arrive after 11 AM:  At 6 AM on the day of the exam:    Take 2 tablets of Dulcolax (Bisacodyl).     Drink the other half of the Golytyel jug. Drink one 8-ounce glass every 15 minutes  until the jug is empty. Drink each glass quickly. You should finish the prep 4 hours before the exam.      Day of exam:      You may drink clear liquids only up until 4 hours before your exam.    Do not drink anything 4 hours before your exam, not even water. (If you must take medicine, you can take it with a sip of water.)     Do not chew or swallow anything including water or gum for at least 4 hours before your exam. If you do, we may cancel the exam for your safety.     Do not take diabetes medicine by mouth until after your exam.    If you have asthma, bring your inhaler.    Arrive with an adult who will take you home after your test. The medicine used will make you sleepy. If you don't have someone to take you home, we will cancel your test.       CLEAR LIQUIDS   You may have:    Water, tea, coffee (no milk or cream)    Soda pop, Gatorade (not red or purple)    Jell-O, Popsicles (no milk or fruit pieces - not red or purple)    Fat-free soup broth or bouillon    Plain hard candy, such as clear life savers (not red or purple)    Clear juices and fruit-flavored drinks, such as apple juice, white grape juice, Hi-C, and Grant-Aid (not red or purple)   Do not have:    Milk or milk products such as ice cream, malts or shakes, or coffee creamer    Red or purple drinks of any kind such as cranberry juice or grape juice. Avoid red or purple Jell-O, Popsicles, Grant-Aid, sorbet, sherbet and candy    Juices with pulp such as orange, grapefruit, pineapple or tomato juice    Cream soups of any kind    Alcohol and beer    Protein drinks or protein powder     LOW FIBER DIET   You may have:      Starches: White bread, rolls, biscuits, croissants, [place] toast, white flour tortillas, waffles, pancakes, Indonesian toast; white rice, noodles, pasta, macaroni; cooked and peeled potatoes; plain crackers, saltines; cooked farina or cream of rice; puffed rice, corn flakes, Rice Krispies, Special K     Vegetables: tender cooked and canned,  vegetable broths    Fruits and fruit juices: Strained fruit juice, canned fruit without seeds or skin (not pineapple), applesauce, pear sauce, ripe bananas, melons (not watermelon)     Milk products: Milk (plain or flavored), cheese, cottage cheese, yogurt (no berries), custard, ice cream      Proteins: Tender, well-cooked ground beef, lamb, veal, ham, pork, chicken, turkey, fish or organ meats; eggs; creamy peanut butter     Fats and condiments:  Margarine, butter, oils, mayonnaise, sour cream, salad dressing, plain gravy; spices, cooked herbs; sugar, clear jelly, honey, syrup     Snacks, sweets and drinks: Pretzels, hard candy; plain cakes and cookies (no nuts or seeds); gelatin, plain pudding, sherbet, Popsicles; coffee, tea, carbonated ( fizzy ) drinks Do not have:      Starches: Breads or rolls that contain nuts, seeds or fruit; whole wheat or whole grain breads that contain more than 1 gram of fiber per slice; cornbread; corn or whole wheat tortillas; potatoes with skin; brown rice, wild rice, kasha (buckwheat), and oatmeal     Vegetables: Any raw or steamed vegetables; vegetables with seeds; corn in any form     Fruits and fruit juices: Prunes, prune juice, raisins and other dried fruits, berries and other fruits with seeds, canned pineapple juices with pulp such as orange, grapefruit, pineapple or tomato juice    Milk products: Any yogurt with nuts, seeds or berries     Proteins: Tough, fibrous meats with gristle; cooked dried beans, peas or lentils; crunchy peanut butter    Fats and condiments: Pickles, olives, relish, horseradish; jam, marmalade, preserves     Snacks, sweets and drinks: Popcorn, nuts, seeds, granola, coconut, candies made with nuts or seeds; all desserts that contain nuts, seeds, raisins and other dried fruits, coconut, whole grains or bran.        FAQ:      How do you know if your colon is cleaned out?   o After completing the bowel prep, your bowel movements should be all liquid and  yellow. Your bowel movements will look similar to urine in the toilet. If there are pieces of stool (poop) in the toilet, or if you can't see to the bottom of the toilet, please call our office for advice. Call [phone] and ask to speak with a nurse.     Why do you need a responsible  to take you home and stay with you?  o We require a responsible adult to take you home for your safety. The sedation medicines used to relax you during the procedure can impair your judgement and reaction time, make you forgetful and possible a little unsteady. Do not drive, make any important decisions, or sign any legal documents for 24 hours after your procedure.     It is normal to feel bloated and gassy after your procedure. Walking will help move the air through your colon. You can take non-aspirin pain relievers that contain acetaminophen (Tylenol).     When can you eat after your procedure?  o You may resume your normal diet when you feel ready, unless advised otherwise by the doctor performing your procedure. Do not drink alcohol for 24 hours after your procedure.     You many resume normal activities (work, exercise, etc.) after 24 hours.     When will you get test results?  o You should have your procedure results and any lab results (if applicable) by letter, MyChart message, or phone call within 2 weeks. If you have any questions, please call the doctor that referred you for the procedure.       Thank you for choosing [facility], for your procedure. If you are sent a survey regarding your care, please take the time to complete the questionnaire. We value your feedback!             Updated: 6/22/2022

## 2022-08-22 NOTE — TELEPHONE ENCOUNTER
Attempted to contact patient's son, Rory, regarding upcoming colonoscopy  procedure on 9/1/22 for pre assessment questions. No answer. Son is on consent to communicate form in chart.     Left message to return call to [phone] #[phone]    Covid test scheduled? No. Discuss at home rapid antigen COVID test 1-2 days prior to procedure.    Arrival time: 1400    Facility location: [facility]    Sedation type: CS    Indication for procedure: +fit    Anticoagulants: no.     Bowel prep recommendation: Golytely d/t mg citrate recall.     Golytely  script sent to [id number] - [place], [place] - [address]. Prep instructions sent via letter. Updated.     Betty Talbert RN

## 2022-08-29 ENCOUNTER — LAB (OUTPATIENT)
Dept: LAB | Facility: CLINIC | Age: 77
End: 2022-08-29
Payer: COMMERCIAL

## 2022-08-29 DIAGNOSIS — Z20.822 ENCOUNTER FOR LABORATORY TESTING FOR COVID-19 VIRUS: ICD-10-CM

## 2022-08-29 PROCEDURE — U0003 INFECTIOUS AGENT DETECTION BY NUCLEIC ACID (DNA OR RNA); SEVERE ACUTE RESPIRATORY SYNDROME CORONAVIRUS 2 (SARS-COV-2) (CORONAVIRUS DISEASE [COVID-19]), AMPLIFIED PROBE TECHNIQUE, MAKING USE OF HIGH THROUGHPUT TECHNOLOGIES AS DESCRIBED BY CMS-2020-01-R: HCPCS

## 2022-08-29 PROCEDURE — U0005 INFEC AGEN DETEC AMPLI PROBE: HCPCS

## 2022-08-30 LAB — SARS-COV-2 RNA RESP QL NAA+PROBE: NEGATIVE

## 2022-08-30 NOTE — TELEPHONE ENCOUNTER
Pt's daughter Rory returned call.    Pre assessment questions completed for upcoming colonoscopy procedure scheduled on 9.1.2022    Discussed at home rapid antigen COVID test 1-2 days prior to procedure.    Reviewed procedural arrival time 1400 and facility location [facility].    Designated  policy reviewed. Instructed to have someone stay 6 hours post procedure.     Anticoagulation/blood thinners? no    Electronic implanted devices? no    Reviewed Golytely prep instructions with patient's daughter. No fiber/iron supplements or foods that contain nuts/seeds prior to procedure.     Patient's daughter verbalized understanding and had no questions or concerns at this time.    Princess Lennon RN

## 2022-08-30 NOTE — TELEPHONE ENCOUNTER
2nd attempt    Attempted to contact patient regarding upcoming colonoscopy procedure on 9.1.22 for pre assessment questions. No answer.     Left messages with both Rory and Rema (both have consent to communicate)   to return call to [phone] #4    Dian Young RN

## 2022-09-01 ENCOUNTER — DOCUMENTATION ONLY (OUTPATIENT)
Dept: FAMILY MEDICINE | Facility: CLINIC | Age: 77
End: 2022-09-01

## 2022-09-01 ENCOUNTER — APPOINTMENT (OUTPATIENT)
Dept: INTERPRETER SERVICES | Facility: CLINIC | Age: 77
End: 2022-09-01
Attending: INTERNAL MEDICINE
Payer: COMMERCIAL

## 2022-09-01 ENCOUNTER — HOSPITAL ENCOUNTER (OUTPATIENT)
Facility: CLINIC | Age: 77
Discharge: HOME | End: 2022-09-01
Attending: INTERNAL MEDICINE | Admitting: INTERNAL MEDICINE
Payer: COMMERCIAL

## 2022-09-01 VITALS
HEART RATE: 81 BPM | RESPIRATION RATE: 16 BRPM | OXYGEN SATURATION: 99 % | TEMPERATURE: 98.2 F | DIASTOLIC BLOOD PRESSURE: 75 MMHG | SYSTOLIC BLOOD PRESSURE: 133 MMHG

## 2022-09-01 DIAGNOSIS — R19.5 POSITIVE FIT (FECAL IMMUNOCHEMICAL TEST): ICD-10-CM

## 2022-09-01 DIAGNOSIS — J98.4 RESTRICTIVE LUNG DISEASE: Primary | ICD-10-CM

## 2022-09-01 LAB — COLONOSCOPY: NORMAL

## 2022-09-01 PROCEDURE — 250N000011 HC RX IP 250 OP 636: Performed by: INTERNAL MEDICINE

## 2022-09-01 PROCEDURE — 45330 DIAGNOSTIC SIGMOIDOSCOPY: CPT | Performed by: INTERNAL MEDICINE

## 2022-09-01 PROCEDURE — 999N000099 HC STATISTIC MODERATE SEDATION < 10 MIN: Performed by: INTERNAL MEDICINE

## 2022-09-01 PROCEDURE — 45378 DIAGNOSTIC COLONOSCOPY: CPT | Performed by: INTERNAL MEDICINE

## 2022-09-01 RX ORDER — NALOXONE HYDROCHLORIDE 0.4 MG/ML
0.2 INJECTION, SOLUTION INTRAMUSCULAR; INTRAVENOUS; SUBCUTANEOUS
Status: CANCELLED | OUTPATIENT
Start: 2022-09-01

## 2022-09-01 RX ORDER — NALOXONE HYDROCHLORIDE 0.4 MG/ML
0.4 INJECTION, SOLUTION INTRAMUSCULAR; INTRAVENOUS; SUBCUTANEOUS
Status: CANCELLED | OUTPATIENT
Start: 2022-09-01

## 2022-09-01 RX ORDER — ONDANSETRON 2 MG/ML
4 INJECTION INTRAMUSCULAR; INTRAVENOUS EVERY 6 HOURS PRN
Status: CANCELLED | OUTPATIENT
Start: 2022-09-01

## 2022-09-01 RX ORDER — FENTANYL CITRATE 50 UG/ML
INJECTION, SOLUTION INTRAMUSCULAR; INTRAVENOUS PRN
Status: COMPLETED | OUTPATIENT
Start: 2022-09-01 | End: 2022-09-01

## 2022-09-01 RX ORDER — ONDANSETRON 2 MG/ML
4 INJECTION INTRAMUSCULAR; INTRAVENOUS
Status: DISCONTINUED | OUTPATIENT
Start: 2022-09-01 | End: 2022-09-01 | Stop reason: HOSPADM

## 2022-09-01 RX ORDER — LIDOCAINE 40 MG/G
CREAM TOPICAL
Status: DISCONTINUED | OUTPATIENT
Start: 2022-09-01 | End: 2022-09-01 | Stop reason: HOSPADM

## 2022-09-01 RX ORDER — FLUMAZENIL 0.1 MG/ML
0.2 INJECTION, SOLUTION INTRAVENOUS
Status: CANCELLED | OUTPATIENT
Start: 2022-09-01 | End: 2022-09-02

## 2022-09-01 RX ORDER — PROCHLORPERAZINE MALEATE 5 MG
5 TABLET ORAL EVERY 6 HOURS PRN
Status: CANCELLED | OUTPATIENT
Start: 2022-09-01

## 2022-09-01 RX ORDER — ONDANSETRON 4 MG/1
4 TABLET, ORALLY DISINTEGRATING ORAL EVERY 6 HOURS PRN
Status: CANCELLED | OUTPATIENT
Start: 2022-09-01

## 2022-09-01 RX ADMIN — MIDAZOLAM 1 MG: 1 INJECTION INTRAMUSCULAR; INTRAVENOUS at 12:27

## 2022-09-01 RX ADMIN — FENTANYL CITRATE 50 MCG: 50 INJECTION, SOLUTION INTRAMUSCULAR; INTRAVENOUS at 12:27

## 2022-09-01 ASSESSMENT — ACTIVITIES OF DAILY LIVING (ADL): ADLS_ACUITY_SCORE: 37

## 2022-09-01 NOTE — H&P
"[facility] Anesthesia Pre-op History and Physical    Lara MIKEY Frost MRN# [id number]   Age: 77 year old YOB: 1945      Date of Surgery: 9/1/2022 Location [facility]      Date of Exam 9/1/2022 Facility (Same day)       Home clinic: [facility]  Primary care provider: Carlos A Bates         Chief Complaint and/or Reason for Procedure:   Patient presents for   Colonoscopy for FIT+           Active problem list:     Patient Active Problem List    Diagnosis Date Noted     Near syncope 09/16/2016     Priority: Medium     Osteoporosis without pathological fracture 05/10/2016     Priority: Medium     Chronic rhinitis 04/20/2016     Priority: Medium     Moderate persistent   See allergist note       Restrictive lung disease 04/20/2016     Priority: Medium     Moderate   see PFTs       Eczema 08/19/2014     Priority: Medium     Tear film insufficiency 08/19/2014     Priority: Medium     Fu with ophthalmology  Problem list name updated by automated process. Provider to review       Sebaceous cyst 04/23/2013     Priority: Medium     Removed on 2012.              Medications (include herbals and vitamins):   Any Plavix use in the last 7 days? No     Current Facility-Administered Medications   Medication     lidocaine (LMX4) cream     lidocaine 1 % 0.1-1 mL     ondansetron (ZOFRAN) injection 4 mg     sodium chloride (PF) 0.9% PF flush 3 mL     sodium chloride (PF) 0.9% PF flush 3 mL             Allergies:      Allergies   Allergen Reactions     Pollen Extract      Allergic to pollen from a \"[place] leaf\" tree.     Seasonal Allergies Itching     Allergy to Latex? No  Allergy to tape?  No  Intolerances: No            Physical Exam:   All vitals have been reviewed    BP (!) 160/87   Resp 16   SpO2 98%     Constitutional: alert and no distress.   Head: Normocephalic. No masses, lesions, tenderness or abnormalities  Neck: Neck supple. No adenopathy. " Thyroid symmetric, normal size  ENT: ENT exam normal, no neck nodes or sinus tenderness. No oral lesions  Cardiovascular: negative, No lifts, heaves, or thrills. RRR. No murmurs, clicks gallops or rub  Respiratory: negative, Good diaphragmatic excursion. Lungs clear  Gastrointestinal: Abdomen soft, non-tender. BS normal.  No masses, organomegaly  Skin: no suspicious lesions or rashes. No spider angiomata or palmar erythema. Nails normal.  Neurologic: Gait normal. Reflexes normal and symmetric. Sensation grossly WNL.  Psychiatric:  Appropriate, well groomed.  Hematologic/Lymphatic/Immunologic: Normal cervical and supraclavicular  lymph nodes           Lab / Radiology Results:     Lab Results   Component Value Date    WBC 8.1 12/13/2018    RBC 4.99 12/13/2018    HGB 14.2 12/13/2018    HGB 13.8 04/15/2013    HCT 45.3 12/13/2018    HCT 45.1 04/15/2013    MCV 91 12/13/2018    MCV 91.8 04/15/2013    RDW 13.0 12/13/2018     12/13/2018      Lab Results   Component Value Date     12/13/2018     12/29/2014    CO2 25 12/13/2018    BUN 9 12/13/2018    BUN 11 12/29/2014     No components found for: AP, ALB, PROT, SGOT, SGPT, TBIL, DBILCALC  No components found for: PTINR         Anesthetic risk and/or ASA classification:   Acceptable risk for conscious sedation.    Praveena Mccarty MD

## 2022-09-01 NOTE — PROGRESS NOTES
To be completed in Nursing note:    Please reference list for forms that require a visit for completion.  Please remind patients that providers are given 3-5 business days to complete and return forms.      Form type:  Home Health Certification and Plan of Care    Date form received:  9/1/22    Date form completed by Physician: Waiting for Dr. Bates    How was form returned to patient (mailed, faxed, or at  for patient to ): Fax back at [phone]    Date form mailed/faxed/left at  for patient and sent to HIM for scanning: Forms fax 9/12/22      Once form is left for patient, faxed, or mailed PCS will then close the documentation only encounter.

## 2022-09-01 NOTE — OR NURSING
Colonoscopy incomplete due to inadequate prep, moderate sedation initiated. Transferred to  and report given to recovery RN. TBD if patient can return tomorrow after more prep.

## 2022-09-21 ENCOUNTER — ALLIED HEALTH/NURSE VISIT (OUTPATIENT)
Dept: PULMONOLOGY | Facility: OTHER | Age: 77
End: 2022-09-21
Attending: INTERNAL MEDICINE
Payer: COMMERCIAL

## 2022-09-21 DIAGNOSIS — J45.909 ASTHMA: ICD-10-CM

## 2022-09-21 PROCEDURE — 94375 RESPIRATORY FLOW VOLUME LOOP: CPT | Performed by: INTERNAL MEDICINE

## 2022-09-21 PROCEDURE — 94729 DIFFUSING CAPACITY: CPT | Performed by: INTERNAL MEDICINE

## 2022-09-22 LAB
EXPTIME-PRE: 5.33 SEC
FEF2575-%PRED-PRE: 66 %
FEF2575-PRE: 1.05 L/SEC
FEF2575-PRED: 1.58 L/SEC
FEFMAX-%PRED-PRE: 38 %
FEFMAX-PRE: 1.97 L/SEC
FEFMAX-PRED: 5.09 L/SEC
FEV1-%PRED-PRE: 62 %
FEV1-PRE: 1.17 L
FEV1FEV6-PRE: 78 %
FEV1FEV6-PRED: 78 %
FEV1FVC-PRE: 78 %
FEV1FVC-PRED: 79 %
FIFMAX-PRE: 1.25 L/SEC
FVC-%PRED-PRE: 62 %
FVC-PRE: 1.5 L
FVC-PRED: 2.39 L

## 2022-10-06 ENCOUNTER — OFFICE VISIT (OUTPATIENT)
Dept: PULMONOLOGY | Facility: OTHER | Age: 77
End: 2022-10-06
Payer: COMMERCIAL

## 2022-10-06 VITALS
HEIGHT: 62 IN | SYSTOLIC BLOOD PRESSURE: 138 MMHG | OXYGEN SATURATION: 96 % | DIASTOLIC BLOOD PRESSURE: 68 MMHG | HEART RATE: 67 BPM | WEIGHT: 108.9 LBS | BODY MASS INDEX: 20.04 KG/M2

## 2022-10-06 DIAGNOSIS — J98.4 RESTRICTIVE LUNG DISEASE: Primary | ICD-10-CM

## 2022-10-06 DIAGNOSIS — J45.40 MODERATE PERSISTENT ASTHMA WITHOUT COMPLICATION: ICD-10-CM

## 2022-10-06 PROCEDURE — 99204 OFFICE O/P NEW MOD 45 MIN: CPT | Performed by: INTERNAL MEDICINE

## 2022-10-06 RX ORDER — BUDESONIDE AND FORMOTEROL FUMARATE DIHYDRATE 160; 4.5 UG/1; UG/1
2 AEROSOL RESPIRATORY (INHALATION) 2 TIMES DAILY
Qty: 18 G | Refills: 3 | Status: SHIPPED | OUTPATIENT
Start: 2022-10-06 | End: 2023-01-30

## 2022-10-06 RX ORDER — BUDESONIDE AND FORMOTEROL FUMARATE DIHYDRATE 160; 4.5 UG/1; UG/1
2 AEROSOL RESPIRATORY (INHALATION) 2 TIMES DAILY
Qty: 6 G | Refills: 11 | Status: SHIPPED | OUTPATIENT
Start: 2022-10-06 | End: 2022-10-06

## 2022-10-06 ASSESSMENT — ASTHMA QUESTIONNAIRES
QUESTION_1 LAST FOUR WEEKS HOW MUCH OF THE TIME DID YOUR ASTHMA KEEP YOU FROM GETTING AS MUCH DONE AT WORK, SCHOOL OR AT HOME: SOME OF THE TIME
QUESTION_5 LAST FOUR WEEKS HOW WOULD YOU RATE YOUR ASTHMA CONTROL: SOMEWHAT CONTROLLED
QUESTION_2 LAST FOUR WEEKS HOW OFTEN HAVE YOU HAD SHORTNESS OF BREATH: ONCE OR TWICE A WEEK
QUESTION_4 LAST FOUR WEEKS HOW OFTEN HAVE YOU USED YOUR RESCUE INHALER OR NEBULIZER MEDICATION (SUCH AS ALBUTEROL): TWO OR THREE TIMES PER WEEK
ACT_TOTALSCORE: 17
QUESTION_3 LAST FOUR WEEKS HOW OFTEN DID YOUR ASTHMA SYMPTOMS (WHEEZING, COUGHING, SHORTNESS OF BREATH, CHEST TIGHTNESS OR PAIN) WAKE YOU UP AT NIGHT OR EARLIER THAN USUAL IN THE MORNING: ONCE OR TWICE
ACT_TOTALSCORE: 17

## 2022-10-06 NOTE — PROGRESS NOTES
"Pulmonary Clinic Consultation    Assessment:  77yoF with asthma that is not well controlled.        Plan:   OTC allergy treatment/non-drowsy antihistamine  Step up therapy from ICS-->ICS/LABA, trial of symbicort  Already obtained flu vaccine  CT chest to better investigate reason for restriction.     Sofía Biggs MD  Pulmonary/Critical Care      ---------------------------------    Reason for Consult: asthma    HPI: 77yoF with history of asthma and restrictive lung disease presents to establish care.    Patient reports daily coughing, chest tightness, wheezing. Sometimes wakes at night from wheezing having \"asthma attack.\" Unable to identify any triggers for her asthma. Has been about 1 year of using albuterol nearly daily despite Flovent BID. No sick contacts.         ROS:  A 12-system review was negative aside from that mentioned in the HPI.     PMH:  Past Medical History:   Diagnosis Date     Disease of lung 8/19/2014    PFT 2014/in chart Problem list name updated by automated process. Provider to review     Edema of larynx 4/6/2014     Uncomplicated asthma        PSH:  Social History     Tobacco Use     Smoking status: Never Smoker     Smokeless tobacco: Never Used   Substance Use Topics     Alcohol use: No     Drug use: No       Allergies:  Allergies   Allergen Reactions     Pollen Extract      Allergic to pollen from a \"[place] leaf\" tree.     Seasonal Allergies Itching       Family HX:  Family History   Problem Relation Age of Onset     No Known Problems Mother      No Known Problems Father      No Known Problems Maternal Grandmother      No Known Problems Maternal Grandfather      No Known Problems Paternal Grandmother      No Known Problems Paternal Grandfather      No Known Problems Brother      No Known Problems Sister      No Known Problems Son      No Known Problems Daughter      No Known Problems Maternal Half-Brother      No Known Problems Maternal Half-Sister      No Known Problems Paternal " Half-Brother      No Known Problems Paternal Half-Sister      No Known Problems Niece      No Known Problems Nephew      No Known Problems Cousin      No Known Problems Other      Diabetes No family hx of      Coronary Artery Disease No family hx of      Hypertension No family hx of      Hyperlipidemia No family hx of      Breast Cancer No family hx of      Cancer - colorectal No family hx of      Ovarian Cancer No family hx of      Prostate Cancer No family hx of      Other Cancer No family hx of      Mental Illness No family hx of      Cerebrovascular Disease No family hx of      Anesthesia Reaction No family hx of      Asthma No family hx of      Osteoporosis No family hx of      Known Genetic Syndrome No family hx of      Obesity No family hx of      Unknown/Adopted No family hx of      Colon Cancer No family hx of      Depression No family hx of      Anxiety Disorder No family hx of      Mental Illness No family hx of      Substance Abuse No family hx of      Genetic Disorder No family hx of      Thyroid Disease No family hx of      No Known Problems Sister      No Known Problems Daughter      No Known Problems Maternal Aunt      No Known Problems Paternal Aunt      Hereditary Breast and Ovarian Cancer Syndrome No family hx of      Cancer No family hx of      Endometrial Cancer No family hx of        Social Hx:  Social History     Socioeconomic History     Marital status:      Spouse name: Not on file     Number of children: Not on file     Years of education: Not on file     Highest education level: Not on file   Occupational History     Not on file   Tobacco Use     Smoking status: Never Smoker     Smokeless tobacco: Never Used   Substance and Sexual Activity     Alcohol use: No     Drug use: No     Sexual activity: Not Currently   Other Topics Concern     Not on file   Social History Narrative     Not on file     Social Determinants of Health     Financial Resource Strain: Not on file   Food  "Insecurity: Not on file   Transportation Needs: Not on file   Physical Activity: Not on file   Stress: Not on file   Social Connections: Not on file   Intimate Partner Violence: Not on file   Housing Stability: Not on file       Current Meds:  Current Outpatient Medications   Medication     acetaminophen (TYLENOL) 500 MG tablet     albuterol (PROAIR HFA/PROVENTIL HFA/VENTOLIN HFA) 108 (90 Base) MCG/ACT inhaler     alendronate (FOSAMAX) 70 MG tablet     bisacodyl (DULCOLAX) 5 MG EC tablet     diclofenac (VOLTAREN) 1 % topical gel     fluticasone (FLONASE) 50 MCG/ACT nasal spray     meclizine (ANTIVERT) 25 MG tablet     naproxen (NAPROSYN) 375 MG tablet     omega 3 1000 MG CAPS     polyethylene glycol (GOLYTELY) 236 g suspension     triamcinolone (KENALOG) 0.025 % external ointment     calcium carbonate-vitamin D (CALTRATE 600+D) 600-400 MG-UNIT CHEW     cetirizine (ZYRTEC) 10 MG tablet     Cholecalciferol (VITAMIN D) 2000 UNITS tablet     FLOVENT  MCG/ACT inhaler     guaiFENesin (MUCINEX) 600 MG 12 hr tablet     sodium chloride ([employer] SALINE NASAL SPRAY) 0.65 % nasal spray     sulfacetamide (BLEPH-10) 10 % ophthalmic solution     No current facility-administered medications for this visit.         Physical Exam:  /68 (BP Location: Right arm)   Pulse 67   Ht 1.574 m (5' 1.97\")   Wt 49.4 kg (108 lb 14.4 oz)   SpO2 96%   BMI 19.94 kg/m    Gen: alert, oriented, no distress  HEENT: anicteric sclera, mask in place.  Neck: trachea midline, no cervical or supraclavicular lymphadenopathy  CV: Regular rate and rhythm, normal S1 and S2.   Resp: Clear bilaterally with good air entry.   Abd: soft, nontender  Skin: no visible rashes or lesions.   Ext: no cyanosis, clubbing or edema  Neuro: alert, nonfocal, grossly normal.     Labs:  Recent Labs   Lab Test 12/13/18  1003   WBC 8.1   RBC 4.99   HGB 14.2   HCT 45.3   MCV 91   MCH 28.5   MCHC 31.3*   RDW 13.0        No results found for this or any previous " visit.        Imaging studies:  Personally reviewed image/s and Personally reviewed impression/s   XR CHEST 2 VIEWS  12/13/2018 10:31 AM     INDICATION: Dyspnea.  COMPARISON: 03/01/2017 CT chest and 09/16/2016 chest radiograph.     FINDINGS: Shallow inspiration exaggerates heart size and pulmonary vascularity. Opacities in the right middle lobe, lingula have not changed significantly. There may be a trace amount of interstitial edema in the lower lungs. No pleural effusion. Bones   appear demineralized.      PFT's   Personally reviewed with patient.   FEV1/FVC is 78 and is normal.  FEV1 is 1.17 L (62% predicted) and is reduced.  FVC is 1.50 L (62% predicted) and reduced.  Unfortunately DLCO and lung volumes could not be performed.  Impression: normal ratio but reduced FEV1 and FVC suggests restriction.

## 2022-10-16 ENCOUNTER — HOSPITAL ENCOUNTER (OUTPATIENT)
Dept: CT IMAGING | Facility: HOSPITAL | Age: 77
Discharge: HOME | End: 2022-10-16
Attending: INTERNAL MEDICINE | Admitting: INTERNAL MEDICINE
Payer: COMMERCIAL

## 2022-10-16 DIAGNOSIS — J98.4 RESTRICTIVE LUNG DISEASE: ICD-10-CM

## 2022-10-16 PROCEDURE — 71250 CT THORAX DX C-: CPT

## 2022-11-04 DIAGNOSIS — J45.40 MODERATE PERSISTENT ASTHMA WITHOUT COMPLICATION: Primary | ICD-10-CM

## 2022-11-15 ENCOUNTER — OFFICE VISIT (OUTPATIENT)
Dept: PULMONOLOGY | Facility: OTHER | Age: 77
End: 2022-11-15
Payer: COMMERCIAL

## 2022-11-15 VITALS
DIASTOLIC BLOOD PRESSURE: 76 MMHG | BODY MASS INDEX: 19.5 KG/M2 | HEART RATE: 85 BPM | SYSTOLIC BLOOD PRESSURE: 130 MMHG | OXYGEN SATURATION: 95 % | WEIGHT: 106.5 LBS

## 2022-11-15 DIAGNOSIS — J47.1 BRONCHIECTASIS WITH ACUTE EXACERBATION (H): Primary | ICD-10-CM

## 2022-11-15 DIAGNOSIS — J45.40 MODERATE PERSISTENT ASTHMA WITHOUT COMPLICATION: ICD-10-CM

## 2022-11-15 PROCEDURE — 99214 OFFICE O/P EST MOD 30 MIN: CPT | Performed by: NURSE PRACTITIONER

## 2022-11-15 RX ORDER — ALBUTEROL SULFATE 0.83 MG/ML
2.5 SOLUTION RESPIRATORY (INHALATION) 2 TIMES DAILY
Qty: 180 ML | Refills: 3 | Status: SHIPPED | OUTPATIENT
Start: 2022-11-15 | End: 2023-01-30

## 2022-11-15 ASSESSMENT — ASTHMA QUESTIONNAIRES
QUESTION_4 LAST FOUR WEEKS HOW OFTEN HAVE YOU USED YOUR RESCUE INHALER OR NEBULIZER MEDICATION (SUCH AS ALBUTEROL): ONCE A WEEK OR LESS
QUESTION_1 LAST FOUR WEEKS HOW MUCH OF THE TIME DID YOUR ASTHMA KEEP YOU FROM GETTING AS MUCH DONE AT WORK, SCHOOL OR AT HOME: SOME OF THE TIME
QUESTION_5 LAST FOUR WEEKS HOW WOULD YOU RATE YOUR ASTHMA CONTROL: WELL CONTROLLED
ACT_TOTALSCORE: 16
ACT_TOTALSCORE: 16
QUESTION_3 LAST FOUR WEEKS HOW OFTEN DID YOUR ASTHMA SYMPTOMS (WHEEZING, COUGHING, SHORTNESS OF BREATH, CHEST TIGHTNESS OR PAIN) WAKE YOU UP AT NIGHT OR EARLIER THAN USUAL IN THE MORNING: ONCE A WEEK
QUESTION_2 LAST FOUR WEEKS HOW OFTEN HAVE YOU HAD SHORTNESS OF BREATH: ONCE A DAY

## 2022-11-15 NOTE — NURSING NOTE
Patient instructed in use of nebulizer machine and the [employer] flutter valve from [facility] using the  line.  Patient states good understanding of how to use the nebulizer machine and flutter valve.  Nebulizer machine given to patient from [facility]. All paperwork signed and copy scanned to chart. Phone numbers given to patient to call if any questions.

## 2022-11-15 NOTE — PROGRESS NOTES
"  Pulmonary Clinic Follow Up  November 15, 2022      Assessment:  78yo Georgian speaking female with asthma that is not well controlled. PFTs shows decreased FVC and FEV1 with preserved ratio suggestive of restriction.  Recent chest CT scan showed bronchiectasis with bronchial wall thickening and debris.    Plan:   -OTC allergy treatment/non-drowsy antihistamine  -Continue ICS/LABA, symbicort  -Albuterol nebs twice daily and as needed  -Flutter valve twice daily  -Already obtained flu vaccine    Freda Mina CNP  Pulmonary Medicine  [facility]   [phone]      ---------------------------------    Reason for Visit:   Chief Complaint   Patient presents with     Follow Up      Moderate persistent asthma without complication       HPI: 77yoF with history of asthma and restrictive lung disease presents for follow for the above mentioned chief complaint. Visit completed with use of an . She was last seen in clinic by Dr. Biggs on 10/6/2022    Patient reports some improvement in her coughing, chest tightness, wheezing since her last visit.  She still feels like there is something \"stuck\" in her chest. Sometimes wakes at night from wheezing having \"asthma attack.\" Unable to identify any triggers for her asthma.  Still using albuterol a few times daily, using her Symbicort twice daily. Denies fevers. She will occasionally cough up a small amount of sputum that is white.      ACT Total Scores 11/17/2021 10/6/2022 11/15/2022   ACT TOTAL SCORE - - -   ASTHMA ER VISITS - - -   ASTHMA HOSPITALIZATIONS - - -   ACT TOTAL SCORE (Goal Greater than or Equal to 20) 6 17 16   In the past 12 months, how many times did you visit the emergency room for your asthma without being admitted to the hospital? 0 0 0   In the past 12 months, how many times were you hospitalized overnight because of your asthma? 0 0 0       ROS:  A 12-system review was negative aside from that mentioned in the HPI.     PMH:  Past Medical " "History:   Diagnosis Date     Disease of lung 8/19/2014    PFT 2014/in chart Problem list name updated by automated process. Provider to review     Edema of larynx 4/6/2014     Uncomplicated asthma        PSH:  Social History     Tobacco Use     Smoking status: Never     Smokeless tobacco: Never   Substance Use Topics     Alcohol use: No     Drug use: No       Allergies:  Allergies   Allergen Reactions     Pollen Extract      Allergic to pollen from a \"[place] leaf\" tree.     Seasonal Allergies Itching       Family HX:  The patient has no significant family history    Social Hx:  Social History     Socioeconomic History     Marital status:      Spouse name: Not on file     Number of children: Not on file     Years of education: Not on file     Highest education level: Not on file   Occupational History     Not on file   Tobacco Use     Smoking status: Never     Smokeless tobacco: Never   Substance and Sexual Activity     Alcohol use: No     Drug use: No     Sexual activity: Not Currently   Other Topics Concern     Not on file   Social History Narrative     Not on file     Social Determinants of Health     Financial Resource Strain: Not on file   Food Insecurity: Not on file   Transportation Needs: Not on file   Physical Activity: Not on file   Stress: Not on file   Social Connections: Not on file   Intimate Partner Violence: Not on file   Housing Stability: Not on file       Current Meds:  Current Outpatient Medications   Medication     acetaminophen (TYLENOL) 500 MG tablet     albuterol (PROAIR HFA/PROVENTIL HFA/VENTOLIN HFA) 108 (90 Base) MCG/ACT inhaler     albuterol (PROVENTIL) (2.5 MG/3ML) 0.083% neb solution     alendronate (FOSAMAX) 70 MG tablet     bisacodyl (DULCOLAX) 5 MG EC tablet     budesonide-formoterol (SYMBICORT) 160-4.5 MCG/ACT Inhaler     diclofenac (VOLTAREN) 1 % topical gel     fluticasone (FLONASE) 50 MCG/ACT nasal spray     meclizine (ANTIVERT) 25 MG tablet     naproxen (NAPROSYN) 375 " MG tablet     omega 3 1000 MG CAPS     polyethylene glycol (GOLYTELY) 236 g suspension     triamcinolone (KENALOG) 0.025 % external ointment     No current facility-administered medications for this visit.         Physical Exam:  /76 (BP Location: Right arm)   Pulse 85   Wt 48.3 kg (106 lb 8 oz)   SpO2 95%   BMI 19.50 kg/m      Physical Exam  Constitutional:       General: She is not in acute distress.     Appearance: She is not ill-appearing or diaphoretic.   HENT:      Nose: Nose normal.   Cardiovascular:      Rate and Rhythm: Normal rate and regular rhythm.      Pulses: Normal pulses.      Heart sounds: Normal heart sounds.   Pulmonary:      Effort: Pulmonary effort is normal. No respiratory distress.      Breath sounds: Normal breath sounds. No wheezing or rhonchi.   Musculoskeletal:      Right lower leg: No edema.      Left lower leg: No edema.   Skin:     General: Skin is warm and dry.      Findings: No rash.   Neurological:      Mental Status: She is alert.   Psychiatric:         Behavior: Behavior normal.         Labs:  Recent Labs   Lab Test 12/13/18  1003   WBC 8.1   RBC 4.99   HGB 14.2   HCT 45.3   MCV 91   MCH 28.5   MCHC 31.3*   RDW 13.0        No results found for this or any previous visit.        Imaging studies:  Personally reviewed image/s and Personally reviewed impression/s    XR CHEST 2 VIEWS  12/13/2018 10:31 AM  INDICATION: Dyspnea.  COMPARISON: 03/01/2017 CT chest and 09/16/2016 chest radiograph.  FINDINGS: Shallow inspiration exaggerates heart size and pulmonary vascularity. Opacities in the right middle lobe, lingula have not changed significantly. There may be a trace amount of interstitial edema in the lower lungs. No pleural effusion. Bones   appear demineralized.    EXAM: CT CHEST HI-RESOLUTION WO CONTRAST  DATE/TIME: 10/16/2022 11:45 AM  INDICATION:  Restrictive lung disease  COMPARISON: No prior cross-sectional imaging of the chest; frontal and lateral radiographs of  the chest 12/13/2018  FINDINGS:   LUNGS AND PLEURA: There is a focal opacities within the anterior basal segment of the right lower lobe, and to a lesser extent in the right middle lobe and lingula associated with conspicuous volume loss, bronchiectasis, and subsegmental airway debris.   The anterior right lower lobe lung opacity contains a few small calcifications. In the adjacent lateral basal segment of the right lower lobe there are additional foci of visible peripheral airway material as well as branching centrilobular   nodules/tree-in-bud opacity. Sparse peripheral airway debris noted within the left lower lobe and superior lingular segment. Air trapping is not a conspicuous feature on the expiratory images. No pleural effusion or pleural thickening.  MEDIASTINUM: Cardiac chambers are normal in size. No pericardial effusion. Mild calcification of the aortic root but no root enlargement. Preserved sinotubular junction. Normal caliber thoracic aorta. Conventional arch anatomy. There are granulomatous   calcifications present in right perihilar, subcarinal and right paratracheal lymph nodes, typical distribution for remote prior granulomatous infection. No lymphadenopathy. Decompressed esophagus. No actionable thyroid nodules.  CORONARY ARTERY CALCIFICATION: Mild.  IMPRESSION:   1.  Subsegmental airway abnormalities are present in the basal right lower lobe, lingula, and right middle lobe including bronchiectasis, bronchial wall thickening and endoluminal airway material. Focal cicatrization in the anterior basal right lower lobe and subsegmental cicatrization in the right middle lobe and lingula. 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  2.  No findings to suggest a diffuse fibrosing lung disorder or obstructive small airways process.      PFT's   Personally reviewed with patient.   FEV1/FVC is 78 and is normal.  FEV1 is 1.17 L (62% predicted) and is  reduced.  FVC is 1.50 L (62% predicted) and reduced.  Unfortunately DLCO and lung volumes could not be performed.  Impression: normal ratio but reduced FEV1 and FVC suggests restriction.

## 2022-11-15 NOTE — PATIENT INSTRUCTIONS
- use the flutter valve after your nebulizer treatments twice daily.  - use the albuterol nebulizer twice daily no matter what. This will help loosen the sputum that is stuck in your chest.     If you have worsening symptoms, questions, or need to speak with the nurse please call [phone].

## 2022-12-06 ENCOUNTER — OFFICE VISIT (OUTPATIENT)
Dept: FAMILY MEDICINE | Facility: CLINIC | Age: 77
End: 2022-12-06
Payer: COMMERCIAL

## 2022-12-06 ENCOUNTER — ANCILLARY PROCEDURE (OUTPATIENT)
Dept: GENERAL RADIOLOGY | Facility: CLINIC | Age: 77
End: 2022-12-06
Attending: FAMILY MEDICINE
Payer: COMMERCIAL

## 2022-12-06 VITALS
DIASTOLIC BLOOD PRESSURE: 77 MMHG | HEART RATE: 82 BPM | TEMPERATURE: 99 F | RESPIRATION RATE: 18 BRPM | SYSTOLIC BLOOD PRESSURE: 129 MMHG | OXYGEN SATURATION: 95 % | BODY MASS INDEX: 20.14 KG/M2 | WEIGHT: 110 LBS

## 2022-12-06 DIAGNOSIS — M25.551 HIP PAIN, RIGHT: Primary | ICD-10-CM

## 2022-12-06 DIAGNOSIS — Z23 NEED FOR PROPHYLACTIC VACCINATION AND INOCULATION AGAINST INFLUENZA: ICD-10-CM

## 2022-12-06 PROCEDURE — 99214 OFFICE O/P EST MOD 30 MIN: CPT | Mod: 25 | Performed by: FAMILY MEDICINE

## 2022-12-06 PROCEDURE — 73502 X-RAY EXAM HIP UNI 2-3 VIEWS: CPT | Mod: TC | Performed by: RADIOLOGY

## 2022-12-06 PROCEDURE — 90662 IIV NO PRSV INCREASED AG IM: CPT | Performed by: FAMILY MEDICINE

## 2022-12-06 PROCEDURE — G0008 ADMIN INFLUENZA VIRUS VAC: HCPCS | Performed by: FAMILY MEDICINE

## 2022-12-06 PROCEDURE — 0124A COVID-19 VACCINE BIVALENT BOOSTER 12+ (PFIZER): CPT | Performed by: FAMILY MEDICINE

## 2022-12-06 PROCEDURE — 91312 COVID-19 VACCINE BIVALENT BOOSTER 12+ (PFIZER): CPT | Performed by: FAMILY MEDICINE

## 2022-12-06 ASSESSMENT — ASTHMA QUESTIONNAIRES: ACT_TOTALSCORE: 15

## 2022-12-06 NOTE — PROGRESS NOTES
Preceptor Attestation:   I was present with the medical student who participated in the service and in the documentation of this note. I have verified the history and personally performed the physical exam and medical decision making. I have verified the content of the note, which accurately reflects my assessment of the patient and the plan of care.   Supervising Physician:  Carlos A Bates MD.                   Here with son. Son is interpreting.       S: Lara Frost is a 77 year old female who  states that  reports right hip hurts the most of all the joints that have pain. Left knee sometimes gives out and she falls. Wonders about surgery as an option and the failure rate of it. Takes Tylenol 2 tablets daily as needed only. Uses cane to walk around and get out of chairs. Does exercises for strength at home..    PMHX/PSHX/MEDS/ALLERGIES/SHX/FHX reviewed and updated in Epic.      ROS:  General: No fevers, chills  Head: No headache  Ears: No acute change in hearing.    CV: No chest pain or palpitations.  Resp: No shortness of breath.  No cough. No hemoptysis.  GI: No nausea, vomiting, constipation, diarrhea  : No urinary pains    O: /77 (BP Location: Right arm, Patient Position: Sitting, Cuff Size: Adult Regular)   Pulse 82   Temp 99  F (37.2  C) (Tympanic)   Resp 18   Wt 49.9 kg (110 lb)   SpO2 95%   BMI 20.14 kg/m     Gen:  frail in NAD    CV:  RRR  - no murmurs, rubs, or gallups,   Pulm:  CTAB, no wheezes/rales/rhonchi, good air entry   ABD: soft, nontender, no masses, no rebound, BS intact throughout  Extrem: no cyanosis, edema or clubbing  Psych: Euthymic      She demonstrated self PT with almost FROM of right hip   while siting down  Hard to  get up from chair, uses cane    Strength: right & left leg extension: 4/5 while siting  (M25.551) Hip pain, right  (primary encounter diagnosis)  Comment: Probably DJD Continue self PT  Plan: naproxen (NAPROSYN) 375 MG tablet, with foods BID continue tylenol  BID   Scheduled Pain  meds for at least 10 days, then as needed     XR Hip Right       Will confider injections in hip join by cj      (Z23) Need for prophylactic vaccination and inoculation against influenza    Plan: INFLUENZA, QUAD, HIGH DOSE, PF, 65YR + (FLUZONE        HD)           RTC in 2 to 3 weeks, for follow up of Hip pain-R or sooner if develops new or worsening symptoms.    Carlos A Bates MD

## 2022-12-06 NOTE — PATIENT INSTRUCTIONS
For hip pain:  -Take 2 Tylenol and 1 naproxen with breakfast  -Take 2 Tylenol and 1 naproxen with dinner  -Continue doing your strengthening exercises at home  -Follow up in clinic here in 3 weeks for your hip pain.

## 2022-12-06 NOTE — LETTER
December 8, 2022      Lara Frost  [address] [place] [address]        Dear ,    We are writing to inform you of your test results.    I am writing you this letter regarding your results because we were unable to reach you by phone. Your lab results :    Right hip  x rays results  are back,  no fractures and joint normal    That is very good news Still inflammation of joint/synovitis can produce pain         Please call the clinic to update your phone number and other demographics. Thanks.      Resulted Orders   XR Hip Right 2-3 Views    Narrative    EXAM: XR HIP RIGHT 2-3 VIEWS  LOCATION: [facility]  DATE/TIME: 12/6/2022 11:26 AM    INDICATION:  Hip pain, right  COMPARISON: None.      Impression    IMPRESSION: Normal joint spaces and alignment. No fracture. No degenerative changes.       If you have any questions or concerns, please call the clinic at the number listed above.       Sincerely,      Carlos A Bates MD

## 2022-12-26 ENCOUNTER — OFFICE VISIT (OUTPATIENT)
Dept: FAMILY MEDICINE | Facility: CLINIC | Age: 77
End: 2022-12-26
Payer: COMMERCIAL

## 2022-12-26 VITALS
WEIGHT: 109.8 LBS | TEMPERATURE: 98.3 F | RESPIRATION RATE: 16 BRPM | HEART RATE: 90 BPM | OXYGEN SATURATION: 96 % | SYSTOLIC BLOOD PRESSURE: 138 MMHG | BODY MASS INDEX: 20.1 KG/M2 | DIASTOLIC BLOOD PRESSURE: 84 MMHG

## 2022-12-26 DIAGNOSIS — M25.551 HIP PAIN, RIGHT: ICD-10-CM

## 2022-12-26 DIAGNOSIS — M81.8 OTHER OSTEOPOROSIS WITHOUT CURRENT PATHOLOGICAL FRACTURE: ICD-10-CM

## 2022-12-26 PROCEDURE — 99213 OFFICE O/P EST LOW 20 MIN: CPT | Performed by: FAMILY MEDICINE

## 2022-12-26 RX ORDER — ALENDRONATE SODIUM 70 MG/1
TABLET ORAL
Qty: 12 TABLET | Refills: 3 | Status: SHIPPED | OUTPATIENT
Start: 2022-12-26 | End: 2023-02-07

## 2022-12-26 NOTE — PATIENT INSTRUCTIONS
Naproxen and tylenol  for discomfort so you be ambulatory   Naproxen needs to be taken with foods, only when pain a lot      To make hip stronger, you vitamin d  , vitamins and fosamax   Follow-up  6 to 12 weeks for hip pains

## 2022-12-26 NOTE — PROGRESS NOTES
S: Lara Frost is a 77 year old female who returns for follow up of  Hip pain , x ray results discussed. Ok hip for age   feels better with naproxen and self PT   Patients states that main concern today is  Follow-up hip pain    PMHX/PSHX/MEDS/ALLERGIES/SHX/FHX reviewed and updated in Epic.      ROS:  General: No fevers, chills  Head: No headache  Ears: No acute change in hearing.    CV: No chest pain or palpitations.  Resp: No shortness of breath.  No cough. No hemoptysis.  GI: No nausea, vomiting, constipation, diarrhea  : No urinary pains    O: /84 (BP Location: Left arm, Patient Position: Sitting, Cuff Size: Adult Regular)   Pulse 90   Temp 98.3  F (36.8  C) (Oral)   Resp 16   Wt 49.8 kg (109 lb 12.8 oz)   SpO2 96%   BMI 20.10 kg/m     Gen:  Well nourished and in NAD  Came  For mulation  CV:  RRR  - no murmurs, rubs, or gallups,   Pulm:  CTAB, no wheezes/rales/rhonchi, good air entry   ABD: soft, nontender, no masses, no rebound, BS intact throughout  Extrem: no cyanosis, edema or clubbing  Psych: Euthymic          A/P hip pain   x rays ok   Plan: Continue  naproxen and tylenol as needed            Continue fosamax and vitamin d    RTC in 6 to 12  weeks, for follow up of Hip pain or sooner if develops new or worsening symptoms.    Carlos A Bates MD

## 2023-01-30 ENCOUNTER — OFFICE VISIT (OUTPATIENT)
Dept: PULMONOLOGY | Facility: CLINIC | Age: 78
End: 2023-01-30
Payer: COMMERCIAL

## 2023-01-30 VITALS
WEIGHT: 110.2 LBS | OXYGEN SATURATION: 97 % | HEART RATE: 96 BPM | DIASTOLIC BLOOD PRESSURE: 80 MMHG | SYSTOLIC BLOOD PRESSURE: 126 MMHG | BODY MASS INDEX: 20.18 KG/M2

## 2023-01-30 DIAGNOSIS — H10.13 ALLERGIC CONJUNCTIVITIS, BILATERAL: ICD-10-CM

## 2023-01-30 DIAGNOSIS — J45.40 MODERATE PERSISTENT ASTHMA WITHOUT COMPLICATION: Primary | ICD-10-CM

## 2023-01-30 DIAGNOSIS — J47.9 BRONCHIECTASIS WITHOUT COMPLICATION (H): ICD-10-CM

## 2023-01-30 PROCEDURE — 99214 OFFICE O/P EST MOD 30 MIN: CPT | Performed by: NURSE PRACTITIONER

## 2023-01-30 RX ORDER — ALBUTEROL SULFATE 90 UG/1
AEROSOL, METERED RESPIRATORY (INHALATION)
Qty: 18 G | Refills: 3 | Status: SHIPPED | OUTPATIENT
Start: 2023-01-30 | End: 2023-02-07

## 2023-01-30 RX ORDER — ALBUTEROL SULFATE 0.83 MG/ML
2.5 SOLUTION RESPIRATORY (INHALATION) 2 TIMES DAILY
Qty: 180 ML | Refills: 11 | Status: SHIPPED | OUTPATIENT
Start: 2023-01-30 | End: 2023-08-09

## 2023-01-30 RX ORDER — BUDESONIDE AND FORMOTEROL FUMARATE DIHYDRATE 160; 4.5 UG/1; UG/1
2 AEROSOL RESPIRATORY (INHALATION) 2 TIMES DAILY
Qty: 18 G | Refills: 11 | Status: SHIPPED | OUTPATIENT
Start: 2023-01-30 | End: 2023-08-09

## 2023-01-30 RX ORDER — OLOPATADINE HYDROCHLORIDE 2 MG/ML
1 SOLUTION/ DROPS OPHTHALMIC DAILY
Qty: 2.5 ML | Refills: 3 | Status: SHIPPED | OUTPATIENT
Start: 2023-01-30 | End: 2023-02-07

## 2023-01-30 ASSESSMENT — ASTHMA QUESTIONNAIRES
QUESTION_1 LAST FOUR WEEKS HOW MUCH OF THE TIME DID YOUR ASTHMA KEEP YOU FROM GETTING AS MUCH DONE AT WORK, SCHOOL OR AT HOME: SOME OF THE TIME
ACT_TOTALSCORE: 15
QUESTION_5 LAST FOUR WEEKS HOW WOULD YOU RATE YOUR ASTHMA CONTROL: SOMEWHAT CONTROLLED
ACT_TOTALSCORE: 15
QUESTION_2 LAST FOUR WEEKS HOW OFTEN HAVE YOU HAD SHORTNESS OF BREATH: ONCE A DAY
QUESTION_4 LAST FOUR WEEKS HOW OFTEN HAVE YOU USED YOUR RESCUE INHALER OR NEBULIZER MEDICATION (SUCH AS ALBUTEROL): ONE OR TWO TIMES PER DAY
QUESTION_3 LAST FOUR WEEKS HOW OFTEN DID YOUR ASTHMA SYMPTOMS (WHEEZING, COUGHING, SHORTNESS OF BREATH, CHEST TIGHTNESS OR PAIN) WAKE YOU UP AT NIGHT OR EARLIER THAN USUAL IN THE MORNING: NOT AT ALL

## 2023-01-30 NOTE — PATIENT INSTRUCTIONS
- Go to the lab at [facility] to have your blood drawn. This is located on the second floor of the hospital right inside the main entrance.     If you have worsening symptoms, questions, or need to speak with the nurse please call [phone].    Freda Mnia, CNP  Pulmonary Medicine  [facility]   [phone]

## 2023-01-30 NOTE — PROGRESS NOTES
Pulmonary Clinic Follow Up  January 30, 2023      Assessment:  76yo Cameroonian speaking female with asthma that is not well controlled. PFTs shows decreased FVC and FEV1 with preserved ratio suggestive of restriction.  Recent chest CT scan showed bronchiectasis with bronchial wall thickening and debris. She has been consistent with BID Symbicort, albuterol nebs, and flutter valve use.     Plan:   -Continue Flonase or OTC allergy treatment/non-drowsy antihistamine  -Continue ICS/LABA, Symbicort  -Albuterol nebs twice daily and as needed  -Flutter valve twice daily  -IgE and CBC with diff to assess need to allergy referral for biologics.   -UTD with flu and pneumonia vaccine    Freda Mina CNP  Pulmonary Medicine  [facility]   [phone]      ---------------------------------    Reason for Visit:   Chief Complaint   Patient presents with     Follow Up     Asthma        HPI: 77yoF with history of asthma and restrictive lung disease presents for follow for the above mentioned chief complaint. Visit completed with use of an . She was last seen in clinic on 11/15/22 where it was suggested she use her flutter valve following a nebulizer treatment twice daily. She has been consistent with both neb treatments BID and Symbicort BID. Previous IgE level in 2016 was elevated at 2,572.     Patient reports feeling well with occasional flares in symptoms since her last visit. She is unsure if she feels better since her last visit and her ACT score is again 15. When her symptoms flare she reports coughing, chest tightness, and wheezing. She is also complaining of eye itching and tearing. Unable to identify any triggers for her asthma. Denies fevers. She will occasionally cough up a small amount of sputum that is white.      ACT Total Scores 11/15/2022 12/6/2022 1/30/2023   ACT TOTAL SCORE - - -   ASTHMA ER VISITS - - -   ASTHMA HOSPITALIZATIONS - - -   ACT TOTAL SCORE (Goal Greater than or Equal to 20) 16 15  "15   In the past 12 months, how many times did you visit the emergency room for your asthma without being admitted to the hospital? 0 0 0   In the past 12 months, how many times were you hospitalized overnight because of your asthma? 0 0 0       ROS:  A 12-system review was negative aside from that mentioned in the HPI.     PMH:  Past Medical History:   Diagnosis Date     Disease of lung 8/19/2014    PFT 2014/in chart Problem list name updated by automated process. Provider to review     Edema of larynx 4/6/2014     Uncomplicated asthma        PSH:  Social History     Tobacco Use     Smoking status: Never     Smokeless tobacco: Never   Vaping Use     Vaping Use: Never used   Substance Use Topics     Alcohol use: No     Drug use: No       Allergies:  Allergies   Allergen Reactions     Pollen Extract      Allergic to pollen from a \"Bel[place] leaf\" tree.     Seasonal Allergies Itching       Family HX:  The patient has no significant family history    Social Hx:  Social History     Socioeconomic History     Marital status:      Spouse name: Not on file     Number of children: Not on file     Years of education: Not on file     Highest education level: Not on file   Occupational History     Not on file   Tobacco Use     Smoking status: Never     Smokeless tobacco: Never   Vaping Use     Vaping Use: Never used   Substance and Sexual Activity     Alcohol use: No     Drug use: No     Sexual activity: Not Currently   Other Topics Concern     Not on file   Social History Narrative     Not on file     Social Determinants of Health     Financial Resource Strain: Not on file   Food Insecurity: Not on file   Transportation Needs: Not on file   Physical Activity: Not on file   Stress: Not on file   Social Connections: Not on file   Intimate Partner Violence: Not on file   Housing Stability: Not on file       Current Meds:  Current Outpatient Medications   Medication     acetaminophen (TYLENOL) 500 MG tablet     albuterol " (PROAIR HFA/PROVENTIL HFA/VENTOLIN HFA) 108 (90 Base) MCG/ACT inhaler     albuterol (PROVENTIL) (2.5 MG/3ML) 0.083% neb solution     alendronate (FOSAMAX) 70 MG tablet     budesonide-formoterol (SYMBICORT) 160-4.5 MCG/ACT Inhaler     diclofenac (VOLTAREN) 1 % topical gel     fluticasone (FLONASE) 50 MCG/ACT nasal spray     meclizine (ANTIVERT) 25 MG tablet     naproxen (NAPROSYN) 375 MG tablet     olopatadine (PATADAY) 0.2 % ophthalmic solution     omega 3 1000 MG CAPS     triamcinolone (KENALOG) 0.025 % external ointment     No current facility-administered medications for this visit.         Physical Exam:  /80 (BP Location: Left arm, Patient Position: Chair, Cuff Size: Adult Regular)   Pulse 96   Wt 50 kg (110 lb 3.2 oz)   SpO2 97%   BMI 20.18 kg/m      Physical Exam  Constitutional:       General: She is not in acute distress.     Appearance: She is not ill-appearing or diaphoretic.   HENT:      Nose: Nose normal.   Cardiovascular:      Rate and Rhythm: Normal rate and regular rhythm.      Pulses: Normal pulses.      Heart sounds: Normal heart sounds.   Pulmonary:      Effort: Pulmonary effort is normal. No respiratory distress.      Breath sounds: Normal breath sounds. No wheezing or rhonchi.   Musculoskeletal:      Right lower leg: No edema.      Left lower leg: No edema.   Skin:     General: Skin is warm and dry.      Findings: No rash.   Neurological:      Mental Status: She is alert.   Psychiatric:         Behavior: Behavior normal.         Labs:  Recent Labs   Lab Test 12/13/18  1003   WBC 8.1   RBC 4.99   HGB 14.2   HCT 45.3   MCV 91   MCH 28.5   MCHC 31.3*   RDW 13.0        IgE 6/2016  Component Ref Range & Units 6 yr ago      Immunoglobulin E 0.00 - 100.00 kU/L 2,572.00 High               Imaging studies:  Personally reviewed image/s and Personally reviewed impression/s    XR CHEST 2 VIEWS  12/13/2018 10:31 AM  INDICATION: Dyspnea.  COMPARISON: 03/01/2017 CT chest and 09/16/2016 chest  radiograph.  FINDINGS: Shallow inspiration exaggerates heart size and pulmonary vascularity. Opacities in the right middle lobe, lingula have not changed significantly. There may be a trace amount of interstitial edema in the lower lungs. No pleural effusion. Bones   appear demineralized.    EXAM: CT CHEST HI-RESOLUTION WO CONTRAST  DATE/TIME: 10/16/2022 11:45 AM  INDICATION:  Restrictive lung disease  COMPARISON: No prior cross-sectional imaging of the chest; frontal and lateral radiographs of the chest 12/13/2018  FINDINGS:   LUNGS AND PLEURA: There is a focal opacities within the anterior basal segment of the right lower lobe, and to a lesser extent in the right middle lobe and lingula associated with conspicuous volume loss, bronchiectasis, and subsegmental airway debris.   The anterior right lower lobe lung opacity contains a few small calcifications. In the adjacent lateral basal segment of the right lower lobe there are additional foci of visible peripheral airway material as well as branching centrilobular   nodules/tree-in-bud opacity. Sparse peripheral airway debris noted within the left lower lobe and superior lingular segment. Air trapping is not a conspicuous feature on the expiratory images. No pleural effusion or pleural thickening.  MEDIASTINUM: Cardiac chambers are normal in size. No pericardial effusion. Mild calcification of the aortic root but no root enlargement. Preserved sinotubular junction. Normal caliber thoracic aorta. Conventional arch anatomy. There are granulomatous   calcifications present in right perihilar, subcarinal and right paratracheal lymph nodes, typical distribution for remote prior granulomatous infection. No lymphadenopathy. Decompressed esophagus. No actionable thyroid nodules.  CORONARY ARTERY CALCIFICATION: Mild.  IMPRESSION:   1.  Subsegmental airway abnormalities are present in the basal right lower lobe, lingula, and right middle lobe including bronchiectasis,  bronchial wall thickening and endoluminal airway material. Focal cicatrization in the anterior basal right lower lobe and subsegmental cicatrization in the right middle lobe and lingula. 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  2.  No findings to suggest a diffuse fibrosing lung disorder or obstructive small airways process.      PFT's   Personally reviewed with patient.   FEV1/FVC is 78 and is normal.  FEV1 is 1.17 L (62% predicted) and is reduced.  FVC is 1.50 L (62% predicted) and reduced.  Unfortunately DLCO and lung volumes could not be performed.  Impression: normal ratio but reduced FEV1 and FVC suggests restriction.

## 2023-02-07 ENCOUNTER — OFFICE VISIT (OUTPATIENT)
Dept: FAMILY MEDICINE | Facility: CLINIC | Age: 78
End: 2023-02-07
Payer: COMMERCIAL

## 2023-02-07 VITALS
DIASTOLIC BLOOD PRESSURE: 78 MMHG | SYSTOLIC BLOOD PRESSURE: 130 MMHG | TEMPERATURE: 97.6 F | HEART RATE: 82 BPM | RESPIRATION RATE: 12 BRPM | OXYGEN SATURATION: 97 % | WEIGHT: 109.2 LBS | BODY MASS INDEX: 19.99 KG/M2

## 2023-02-07 DIAGNOSIS — R29.898 LEG WEAKNESS, BILATERAL: Primary | ICD-10-CM

## 2023-02-07 DIAGNOSIS — J30.2 SEASONAL ALLERGIC RHINITIS, UNSPECIFIED TRIGGER: ICD-10-CM

## 2023-02-07 DIAGNOSIS — M81.8 OTHER OSTEOPOROSIS WITHOUT CURRENT PATHOLOGICAL FRACTURE: ICD-10-CM

## 2023-02-07 DIAGNOSIS — H10.13 ALLERGIC CONJUNCTIVITIS, BILATERAL: ICD-10-CM

## 2023-02-07 DIAGNOSIS — J45.40 MODERATE PERSISTENT ASTHMA WITHOUT COMPLICATION: ICD-10-CM

## 2023-02-07 PROCEDURE — 99214 OFFICE O/P EST MOD 30 MIN: CPT | Performed by: FAMILY MEDICINE

## 2023-02-07 RX ORDER — ALENDRONATE SODIUM 70 MG/1
TABLET ORAL
Qty: 12 TABLET | Refills: 3 | Status: SHIPPED | OUTPATIENT
Start: 2023-02-07 | End: 2023-03-28

## 2023-02-07 RX ORDER — OLOPATADINE HYDROCHLORIDE 2 MG/ML
1 SOLUTION/ DROPS OPHTHALMIC DAILY
Qty: 2.5 ML | Refills: 3 | Status: SHIPPED | OUTPATIENT
Start: 2023-02-07

## 2023-02-07 RX ORDER — ALBUTEROL SULFATE 90 UG/1
AEROSOL, METERED RESPIRATORY (INHALATION)
Qty: 18 G | Refills: 3 | Status: SHIPPED | OUTPATIENT
Start: 2023-02-07 | End: 2023-08-09

## 2023-02-07 RX ORDER — FLUTICASONE PROPIONATE 50 MCG
1-2 SPRAY, SUSPENSION (ML) NASAL DAILY
Qty: 48 ML | Refills: 2 | Status: SHIPPED | OUTPATIENT
Start: 2023-02-07 | End: 2024-01-03

## 2023-02-07 ASSESSMENT — ASTHMA QUESTIONNAIRES: ACT_TOTALSCORE: 20

## 2023-02-07 NOTE — PROGRESS NOTES
S: Lara Frost is a 77 year old female who returns for follow up of  Leg weakness, still doing self PT at home, more difficult to stand from chair, uses cane for walking and holds on rails  For support when going on stairs   No falls    Does not  not want formal PT        2 needs refills of meds     Patients states that main concern today is leg weakness    PMHX/PSHX/MEDS/ALLERGIES/SHX/FHX reviewed and updated in Epic.      ROS:  General: No fevers, chills  Head: No headache  Ears: No acute change in hearing.    CV: No chest pain or palpitations.  Resp: No shortness of breath.  No cough. No hemoptysis.  GI: No nausea, vomiting, constipation, diarrhea  : No urinary pains    O: /78   Pulse 82   Temp 97.6  F (36.4  C) (Oral)   Resp 12   Wt 49.5 kg (109 lb 3.2 oz)   SpO2 97%   BMI 19.99 kg/m     Gen:  Elderly woman in  NAD    CV:  RRR  - no murmurs, rubs, or gallups,   Pulm:  CTAB, no wheezes/rales/rhonchi, good air entry   ABD: soft, nontender, no masses, no rebound, BS intact throughout  Extrem: no cyanosis, edema or clubbing  Psych: Euthymic    Ambulates  but slowly,      A/P  1.  Leg weakness/multifactrial but due to deconditioning and age      Wants to hold on Physical therapy        Continue  osteoporosis treatment      2 Med refills   (J30.2) Seasonal allergic rhinitis, unspecified trigger    Plan: fluticasone (FLONASE) 50 MCG/ACT nasal spray        (H10.13) Allergic conjunctivitis, bilateral    Plan: olopatadine (PATADAY) 0.2 % ophthalmic solution      (M81.8) Other osteoporosis without current pathological fracture    Plan: alendronate (FOSAMAX) 70 MG tablet      (J45.40) Moderate persistent asthma without complication    Plan: albuterol (PROAIR HFA/PROVENTIL HFA/VENTOLIN       RTC in 8  weeks, for follow up of coordination of care or sooner if develops new or worsening symptoms.    Carlos A Bates MD

## 2023-02-07 NOTE — PATIENT INSTRUCTIONS
Medication refills done   Continue physical therapy self or we can refer for formal physical therapy

## 2023-03-28 DIAGNOSIS — M81.8 OTHER OSTEOPOROSIS WITHOUT CURRENT PATHOLOGICAL FRACTURE: ICD-10-CM

## 2023-03-28 RX ORDER — ALENDRONATE SODIUM 70 MG/1
TABLET ORAL
Qty: 12 TABLET | Refills: 3 | Status: SHIPPED | OUTPATIENT
Start: 2023-03-28 | End: 2024-05-07

## 2023-04-14 DIAGNOSIS — M25.551 HIP PAIN, RIGHT: ICD-10-CM

## 2023-06-14 ENCOUNTER — DOCUMENTATION ONLY (OUTPATIENT)
Dept: FAMILY MEDICINE | Facility: CLINIC | Age: 78
End: 2023-06-14
Payer: COMMERCIAL

## 2023-06-14 ENCOUNTER — MEDICAL CORRESPONDENCE (OUTPATIENT)
Dept: HEALTH INFORMATION MANAGEMENT | Facility: CLINIC | Age: 78
End: 2023-06-14
Payer: COMMERCIAL

## 2023-06-14 NOTE — PROGRESS NOTES
To be completed in Nursing note:    Please reference list for forms that require a visit for completion.  Please remind patients that providers are given 3-5 business days to complete and return forms.      Form type: Activ[employer]    Date form received:  06/14/2023    Date form completed by Physician:Dr. Alfonso- completed     How was form returned to patient (mailed, faxed, or at  for patient to ):faxed [phone]    Date form mailed/faxed/left at  for patient and sent to HIM for scanning: sent to scan       Once form is left for patient, faxed, or mailed PCS will then close the documentation only encounter.              Alison Caballero CMA-MARY

## 2023-07-12 ENCOUNTER — OFFICE VISIT (OUTPATIENT)
Dept: FAMILY MEDICINE | Facility: CLINIC | Age: 78
End: 2023-07-12
Payer: COMMERCIAL

## 2023-07-12 VITALS
HEART RATE: 96 BPM | WEIGHT: 96.6 LBS | SYSTOLIC BLOOD PRESSURE: 161 MMHG | DIASTOLIC BLOOD PRESSURE: 91 MMHG | RESPIRATION RATE: 16 BRPM | TEMPERATURE: 98 F | BODY MASS INDEX: 17.69 KG/M2 | OXYGEN SATURATION: 98 %

## 2023-07-12 DIAGNOSIS — Z09 HOSPITAL DISCHARGE FOLLOW-UP: ICD-10-CM
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
## 2023-07-12 DIAGNOSIS — Z02.89 ENCOUNTER FOR COMPLETION OF FORM WITH PATIENT: ICD-10-CM

## 2023-07-12 DIAGNOSIS — M25.551 HIP PAIN, RIGHT: ICD-10-CM

## 2023-07-12 DIAGNOSIS — R03.0 ELEVATED BLOOD PRESSURE READING WITHOUT DIAGNOSIS OF HYPERTENSION: Primary | ICD-10-CM

## 2023-07-12 LAB
ALBUMIN SERPL BCG-MCNC: 4.5 G/DL (ref 3.5–5.2)
ALP SERPL-CCNC: 60 U/L (ref 35–104)
ALT SERPL W P-5'-P-CCNC: 12 U/L (ref 0–50)
ANION GAP SERPL CALCULATED.3IONS-SCNC: 14 MMOL/L (ref 7–15)
AST SERPL W P-5'-P-CCNC: 31 U/L (ref 0–45)
BILIRUB SERPL-MCNC: 0.8 MG/DL
BUN SERPL-MCNC: 12.1 MG/DL (ref 8–23)
CALCIUM SERPL-MCNC: 10.9 MG/DL (ref 8.8–10.2)
CHLORIDE SERPL-SCNC: 100 MMOL/L (ref 98–107)
CREAT SERPL-MCNC: 0.93 MG/DL (ref 0.51–0.95)
DEPRECATED HCO3 PLAS-SCNC: 26 MMOL/L (ref 22–29)
ERYTHROCYTE [DISTWIDTH] IN BLOOD BY AUTOMATED COUNT: 15 % (ref 10–15)
GFR SERPL CREATININE-BSD FRML MDRD: 63 ML/MIN/1.73M2
GLUCOSE SERPL-MCNC: 108 MG/DL (ref 70–99)
HCT VFR BLD AUTO: 39 % (ref 35–47)
HGB BLD-MCNC: 12.1 G/DL (ref 11.7–15.7)
MCH RBC QN AUTO: 27.8 PG (ref 26.5–33)
MCHC RBC AUTO-ENTMCNC: 31 G/DL (ref 31.5–36.5)
MCV RBC AUTO: 90 FL (ref 78–100)
PLATELET # BLD AUTO: 273 10E3/UL (ref 150–450)
POTASSIUM SERPL-SCNC: 3.5 MMOL/L (ref 3.4–5.3)
PROT SERPL-MCNC: 8.3 G/DL (ref 6.4–8.3)
RBC # BLD AUTO: 4.35 10E6/UL (ref 3.8–5.2)
SODIUM SERPL-SCNC: 140 MMOL/L (ref 136–145)
WBC # BLD AUTO: 8.3 10E3/UL (ref 4–11)

## 2023-07-12 PROCEDURE — 99214 OFFICE O/P EST MOD 30 MIN: CPT | Mod: GC

## 2023-07-12 PROCEDURE — 80053 COMPREHEN METABOLIC PANEL: CPT

## 2023-07-12 PROCEDURE — 85027 COMPLETE CBC AUTOMATED: CPT

## 2023-07-12 PROCEDURE — 36415 COLL VENOUS BLD VENIPUNCTURE: CPT

## 2023-07-12 NOTE — NURSING NOTE
Due to patient being non-English speaking/uses sign language, an  was used for this visit. Only for face-to-face interpretation by an external agency, date and length of interpretation can be found on the scanned worksheet.     name: Jonathan  Agency: [employer]  Language: Luxembourgish   Telephone number: [phone]  Type of interpretation: Face-to-face, spoken\

## 2023-07-12 NOTE — PROGRESS NOTES
{PROVIDER CHARTING PREFERENCE:731309}    Subjective   Lara is a 78 year old, presenting for the following health issues:  Other (Handi cap sticker/Mouse bit her finger on right middle finger last week. 7-8 days ago redness and painfull few days but went away after /Hip and Leg pain on both legs from hip down (right leg is worse. Has hard time walking /Came back from [facility]  for side pain and had an infection/Been feeling dizzy and headaches. Her BP has been high for the past 2 days  )        2/7/2023    10:22 AM   Additional Questions   Roomed by AMEENA Hernandez MA   Accompanied by Self     HPI     {SUPERLIST (Optional):414410}  {additonal problems for provider to add (Optional):257686}      Review of Systems   {ROS COMP (Optional):176277}      Objective    BP (!) 161/91 (BP Location: Left arm, Patient Position: Sitting, Cuff Size: Adult Regular)   Pulse 96   Temp 98  F (36.7  C) (Oral)   Resp 16   Wt 43.8 kg (96 lb 9.6 oz)   SpO2 98%   BMI 17.69 kg/m    Body mass index is 17.69 kg/m .  Physical Exam   {Exam List (Optional):889892}    {Diagnostic Test Results (Optional):618971}    {AMBULATORY ATTESTATION (Optional):130392}
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
## 2023-07-12 NOTE — PATIENT INSTRUCTIONS
Thank you for trusting us with your care.     Here's a summary of the visit today:   Stop at lab for blood work   Take acetaminophen (tylenol) for pain, not ibuprofen   Follow up in 2 weeks               Thank you.     Dr. Galindo

## 2023-07-25 ENCOUNTER — TELEPHONE (OUTPATIENT)
Dept: PULMONOLOGY | Facility: CLINIC | Age: 78
End: 2023-07-25
Payer: COMMERCIAL

## 2023-07-25 NOTE — TELEPHONE ENCOUNTER
Left message to call back for: Lara via Guamanian    Information to relay to patient: pt needs lab work done prior to his appointment with Freda on 07/31/2023.    Kassandra Pyle LPN

## 2023-08-02 ENCOUNTER — ANCILLARY PROCEDURE (OUTPATIENT)
Dept: GENERAL RADIOLOGY | Facility: CLINIC | Age: 78
End: 2023-08-02
Attending: STUDENT IN AN ORGANIZED HEALTH CARE EDUCATION/TRAINING PROGRAM
Payer: COMMERCIAL

## 2023-08-02 ENCOUNTER — OFFICE VISIT (OUTPATIENT)
Dept: FAMILY MEDICINE | Facility: CLINIC | Age: 78
End: 2023-08-02
Payer: COMMERCIAL

## 2023-08-02 VITALS
BODY MASS INDEX: 17.61 KG/M2 | SYSTOLIC BLOOD PRESSURE: 144 MMHG | DIASTOLIC BLOOD PRESSURE: 90 MMHG | TEMPERATURE: 98.5 F | OXYGEN SATURATION: 97 % | RESPIRATION RATE: 16 BRPM | HEART RATE: 94 BPM | WEIGHT: 96.2 LBS

## 2023-08-02 DIAGNOSIS — G89.29 CHRONIC PAIN OF RIGHT HIP: ICD-10-CM

## 2023-08-02 DIAGNOSIS — M25.551 CHRONIC PAIN OF RIGHT HIP: ICD-10-CM

## 2023-08-02 DIAGNOSIS — G89.29 CHRONIC PAIN OF RIGHT HIP: Primary | ICD-10-CM

## 2023-08-02 DIAGNOSIS — M25.551 CHRONIC PAIN OF RIGHT HIP: Primary | ICD-10-CM

## 2023-08-02 DIAGNOSIS — M25.551 HIP PAIN, RIGHT: ICD-10-CM

## 2023-08-02 DIAGNOSIS — M81.8 OTHER OSTEOPOROSIS WITHOUT CURRENT PATHOLOGICAL FRACTURE: ICD-10-CM

## 2023-08-02 PROCEDURE — 99214 OFFICE O/P EST MOD 30 MIN: CPT | Mod: GC

## 2023-08-02 PROCEDURE — 73502 X-RAY EXAM HIP UNI 2-3 VIEWS: CPT | Mod: TC | Performed by: RADIOLOGY

## 2023-08-02 PROCEDURE — 72100 X-RAY EXAM L-S SPINE 2/3 VWS: CPT | Mod: TC | Performed by: RADIOLOGY
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
## 2023-08-02 NOTE — PATIENT INSTRUCTIONS
Thank you for trusting us with your care.     Here's a summary of the visit today:   Trial naproxen for pain or ibuprofen but not together    Stop at  for xray  Physical therapy referral placed   Follow up in 4-6 weeks            Thank you.     Dr. Galindo

## 2023-08-09 ENCOUNTER — OFFICE VISIT (OUTPATIENT)
Dept: PULMONOLOGY | Facility: CLINIC | Age: 78
End: 2023-08-09
Payer: COMMERCIAL

## 2023-08-09 VITALS
HEART RATE: 80 BPM | SYSTOLIC BLOOD PRESSURE: 136 MMHG | WEIGHT: 95 LBS | DIASTOLIC BLOOD PRESSURE: 84 MMHG | BODY MASS INDEX: 17.39 KG/M2 | OXYGEN SATURATION: 99 %

## 2023-08-09 DIAGNOSIS — J47.9 BRONCHIECTASIS WITHOUT COMPLICATION (H): Primary | ICD-10-CM

## 2023-08-09 DIAGNOSIS — J45.40 MODERATE PERSISTENT ASTHMA WITHOUT COMPLICATION: ICD-10-CM

## 2023-08-09 PROCEDURE — 99214 OFFICE O/P EST MOD 30 MIN: CPT | Performed by: NURSE PRACTITIONER

## 2023-08-09 RX ORDER — ALBUTEROL SULFATE 0.83 MG/ML
2.5 SOLUTION RESPIRATORY (INHALATION) EVERY 4 HOURS PRN
Qty: 180 ML | Refills: 11 | Status: SHIPPED | OUTPATIENT
Start: 2023-08-09 | End: 2023-11-15

## 2023-08-09 RX ORDER — BUDESONIDE AND FORMOTEROL FUMARATE DIHYDRATE 160; 4.5 UG/1; UG/1
2 AEROSOL RESPIRATORY (INHALATION) 2 TIMES DAILY
Qty: 18 G | Refills: 11 | Status: SHIPPED | OUTPATIENT
Start: 2023-08-09 | End: 2023-11-15

## 2023-08-09 RX ORDER — FLUTICASONE PROPIONATE 220 UG/1
1 AEROSOL, METERED RESPIRATORY (INHALATION) 2 TIMES DAILY
COMMUNITY
End: 2023-10-12

## 2023-08-09 RX ORDER — ALBUTEROL SULFATE 90 UG/1
AEROSOL, METERED RESPIRATORY (INHALATION)
Qty: 18 G | Refills: 3 | Status: SHIPPED | OUTPATIENT
Start: 2023-08-09 | End: 2023-10-12

## 2023-08-09 ASSESSMENT — ASTHMA QUESTIONNAIRES
QUESTION_1 LAST FOUR WEEKS HOW MUCH OF THE TIME DID YOUR ASTHMA KEEP YOU FROM GETTING AS MUCH DONE AT WORK, SCHOOL OR AT HOME: SOME OF THE TIME
QUESTION_2 LAST FOUR WEEKS HOW OFTEN HAVE YOU HAD SHORTNESS OF BREATH: THREE TO SIX TIMES A WEEK

## 2023-08-09 NOTE — PROGRESS NOTES
Pulmonary Clinic Follow Up  August 9, 2023      Assessment:  78 year old Libyan speaking female with asthma that is not well controlled. PFTs shows decreased FVC and FEV1 with preserved ratio suggestive of restriction.  Recent chest CT scan showed bronchiectasis with bronchial wall thickening and debris. She has been consistent with BID Symbicort, albuterol nebs, and flutter valve use.     Plan:   -Continue Flonase or OTC allergy treatment/non-drowsy antihistamine  -Continue ICS/LABA, Symbicort BID. Rinse and gargle after each use.   -Albuterol nebs twice daily and as needed. Increase to QID if increased symptoms.   -Flutter valve following neb treatments   -IgE and CBC with diff to assess need to allergy referral for biologics.   - CT to follow up on airway debris in the setting of continued increased symptoms.   -UTD with flu and pneumonia vaccine    Freda Mina CNP  Pulmonary Medicine  [facility]   [phone]      ---------------------------------    Reason for Visit:   Chief Complaint   Patient presents with    Follow Up     Asthma        HPI:   Visit completed with use of an . She was last seen in clinic in 01/2023 where it was suggested she use her flutter valve following a nebulizer treatment twice daily. She has been consistent with both neb treatments BID and Symbicort BID and continues to have increased chest congestion and chest heaviness. Previous IgE level in 2016 was elevated at 2,572. Labs were ordered at her last visit but she has not had them done yet. She was reminded to do this today.     Patient reports feeling OK with occasional flares in symptoms since her last visit. She is concerned there has not been much improvement with consistent inhaled medication use. She is unsure if she feels better since her last visit and her ACT score is again 15. When her symptoms flare she reports coughing, chest tightness, and wheezing. She is also complaining of eye itching and  "tearing. Unable to identify any triggers for her asthma. Denies fevers. She will occasionally cough up a small amount of sputum that is white.          12/6/2022    10:21 AM 1/30/2023    10:00 AM 2/7/2023    10:30 AM   ACT Total Scores   ACT TOTAL SCORE (Goal Greater than or Equal to 20) 15 15 20   In the past 12 months, how many times did you visit the emergency room for your asthma without being admitted to the hospital? 0 0 0   In the past 12 months, how many times were you hospitalized overnight because of your asthma? 0 0 0       ROS:  A 10-system review was negative aside from that mentioned in the HPI.     PMH:  Past Medical History:   Diagnosis Date    Disease of lung 8/19/2014    PFT 2014/in chart Problem list name updated by automated process. Provider to review    Edema of larynx 4/6/2014    Uncomplicated asthma        PSH:  Social History     Tobacco Use    Smoking status: Never    Smokeless tobacco: Never   Vaping Use    Vaping Use: Never used   Substance Use Topics    Alcohol use: No    Drug use: No       Allergies:  Allergies   Allergen Reactions    Pollen Extract      Allergic to pollen from a \"Rapides leaf\" tree.    Seasonal Allergies Itching       Family HX:  The patient has no significant family history    Social Hx:  Social History     Socioeconomic History    Marital status:      Spouse name: Not on file    Number of children: Not on file    Years of education: Not on file    Highest education level: Not on file   Occupational History    Not on file   Tobacco Use    Smoking status: Never    Smokeless tobacco: Never   Vaping Use    Vaping Use: Never used   Substance and Sexual Activity    Alcohol use: No    Drug use: No    Sexual activity: Not Currently   Other Topics Concern    Not on file   Social History Narrative    Not on file     Social Determinants of Health     Financial Resource Strain: Not on file   Food Insecurity: Not on file   Transportation Needs: Not on file   Physical " Activity: Not on file   Stress: Not on file   Social Connections: Not on file   Intimate Partner Violence: Not on file   Housing Stability: Not on file       Current Meds:  Current Outpatient Medications   Medication    acetaminophen (TYLENOL) 500 MG tablet    albuterol (PROAIR HFA/PROVENTIL HFA/VENTOLIN HFA) 108 (90 Base) MCG/ACT inhaler    albuterol (PROVENTIL) (2.5 MG/3ML) 0.083% neb solution    budesonide-formoterol (SYMBICORT) 160-4.5 MCG/ACT Inhaler    fluticasone (FLOVENT HFA) 220 MCG/ACT inhaler    alendronate (FOSAMAX) 70 MG tablet    diclofenac (VOLTAREN) 1 % topical gel    fluticasone (FLONASE) 50 MCG/ACT nasal spray    meclizine (ANTIVERT) 25 MG tablet    naproxen (NAPROSYN) 375 MG tablet    olopatadine (PATADAY) 0.2 % ophthalmic solution    omega 3 1000 MG CAPS    triamcinolone (KENALOG) 0.025 % external ointment     No current facility-administered medications for this visit.         Physical Exam:  /84 (BP Location: Right arm, Patient Position: Chair, Cuff Size: Adult Regular)   Pulse 80   Wt 43.1 kg (95 lb)   SpO2 99%   BMI 17.39 kg/m      Physical Exam  Constitutional:       General: She is not in acute distress.     Appearance: She is not ill-appearing or diaphoretic.   HENT:      Nose: Nose normal.   Cardiovascular:      Rate and Rhythm: Normal rate and regular rhythm.      Pulses: Normal pulses.      Heart sounds: Normal heart sounds.   Pulmonary:      Effort: Pulmonary effort is normal. No respiratory distress.      Breath sounds: Examination of the right-lower field reveals decreased breath sounds. Examination of the left-lower field reveals decreased breath sounds. Decreased breath sounds present. No wheezing or rhonchi.   Musculoskeletal:      Right lower leg: No edema.      Left lower leg: No edema.   Skin:     General: Skin is warm and dry.      Findings: No rash.   Neurological:      Mental Status: She is alert.   Psychiatric:         Behavior: Behavior normal.          Labs:  Recent Labs   Lab Test 12/13/18  1003   WBC 8.1   RBC 4.99   HGB 14.2   HCT 45.3   MCV 91   MCH 28.5   MCHC 31.3*   RDW 13.0        IgE 6/2016  Component Ref Range & Units 6 yr ago      Immunoglobulin E 0.00 - 100.00 kU/L 2,572.00 High         Imaging studies:  Personally reviewed image/s and Personally reviewed impression/s    XR CHEST 2 VIEWS, 12/13/2018 10:31 AM  INDICATION: Dyspnea.  COMPARISON: 03/01/2017 CT chest and 09/16/2016 chest radiograph.  FINDINGS: Shallow inspiration exaggerates heart size and pulmonary vascularity. Opacities in the right middle lobe, lingula have not changed significantly. There may be a trace amount of interstitial edema in the lower lungs. No pleural effusion. Bones   appear demineralized.    CT CHEST HI-RESOLUTION WO CONTRAST, DATE/TIME: 10/16/2022 11:45 AM  INDICATION:  Restrictive lung disease  COMPARISON: No prior cross-sectional imaging of the chest; frontal and lateral radiographs of the chest 12/13/2018  FINDINGS:   LUNGS AND PLEURA: There is a focal opacities within the anterior basal segment of the right lower lobe, and to a lesser extent in the right middle lobe and lingula associated with conspicuous volume loss, bronchiectasis, and subsegmental airway debris.   The anterior right lower lobe lung opacity contains a few small calcifications. In the adjacent lateral basal segment of the right lower lobe there are additional foci of visible peripheral airway material as well as branching centrilobular   nodules/tree-in-bud opacity. Sparse peripheral airway debris noted within the left lower lobe and superior lingular segment. Air trapping is not a conspicuous feature on the expiratory images. No pleural effusion or pleural thickening.  MEDIASTINUM: Cardiac chambers are normal in size. No pericardial effusion. Mild calcification of the aortic root but no root enlargement. Preserved sinotubular junction. Normal caliber thoracic aorta. Conventional arch  anatomy. There are granulomatous   calcifications present in right perihilar, subcarinal and right paratracheal lymph nodes, typical distribution for remote prior granulomatous infection. No lymphadenopathy. Decompressed esophagus. No actionable thyroid nodules.  CORONARY ARTERY CALCIFICATION: Mild.  IMPRESSION:   1.  Subsegmental airway abnormalities are present in the basal right lower lobe, lingula, and right middle lobe including bronchiectasis, bronchial wall thickening and endoluminal airway material. Focal cicatrization in the anterior basal right lower lobe and subsegmental cicatrization in the right middle lobe and lingula. 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  2.  No findings to suggest a diffuse fibrosing lung disorder or obstructive small airways process.      PFT's   Personally reviewed with patient.   FEV1/FVC is 78 and is normal.  FEV1 is 1.17 L (62% predicted) and is reduced.  FVC is 1.50 L (62% predicted) and reduced.  Unfortunately DLCO and lung volumes could not be performed.  Impression: normal ratio but reduced FEV1 and FVC suggests restriction.

## 2023-08-09 NOTE — PATIENT INSTRUCTIONS
- continue using your Symbicort inhaler twice daily as you have been.   - use you nebulizer more often, at least 2-4 times per day.     If you have worsening symptoms, questions, or need to speak with the nurse please call [phone].

## 2023-08-16 ENCOUNTER — HOSPITAL ENCOUNTER (OUTPATIENT)
Dept: CT IMAGING | Facility: HOSPITAL | Age: 78
Discharge: HOME | End: 2023-08-16
Attending: NURSE PRACTITIONER | Admitting: NURSE PRACTITIONER
Payer: COMMERCIAL

## 2023-08-16 DIAGNOSIS — J45.40 MODERATE PERSISTENT ASTHMA WITHOUT COMPLICATION: ICD-10-CM

## 2023-08-16 DIAGNOSIS — J47.9 BRONCHIECTASIS WITHOUT COMPLICATION (H): ICD-10-CM

## 2023-08-16 PROCEDURE — 71250 CT THORAX DX C-: CPT

## 2023-08-18 ENCOUNTER — TELEPHONE (OUTPATIENT)
Dept: PULMONOLOGY | Facility: CLINIC | Age: 78
End: 2023-08-18
Payer: COMMERCIAL

## 2023-08-18 NOTE — TELEPHONE ENCOUNTER
Spoke with son, Rory. Gave him results and recommendations per Freda Mina CNP. He has no questions at this time.

## 2023-08-18 NOTE — TELEPHONE ENCOUNTER
----- Message from ANTHONY Smith CNP sent at 8/18/2023 10:21 AM CDT -----  Can you call and let them the pneumonia has resolved. Encouraged continued nebulizer and flutter valve use for the bronchiectasis.     Thanks!     ----- Message -----  From: Jasmyn Villeda  Sent: 8/16/2023  12:53 PM CDT  To: ANTHONY Smith CNP

## 2023-08-30 ENCOUNTER — OFFICE VISIT (OUTPATIENT)
Dept: FAMILY MEDICINE | Facility: CLINIC | Age: 78
End: 2023-08-30
Payer: COMMERCIAL

## 2023-08-30 VITALS
RESPIRATION RATE: 16 BRPM | WEIGHT: 96.2 LBS | SYSTOLIC BLOOD PRESSURE: 157 MMHG | DIASTOLIC BLOOD PRESSURE: 79 MMHG | BODY MASS INDEX: 17.61 KG/M2 | OXYGEN SATURATION: 98 % | HEART RATE: 73 BPM | TEMPERATURE: 98.5 F

## 2023-08-30 DIAGNOSIS — J45.40 MODERATE PERSISTENT ASTHMA WITHOUT COMPLICATION: ICD-10-CM

## 2023-08-30 DIAGNOSIS — J47.9 BRONCHIECTASIS WITHOUT COMPLICATION (H): Primary | ICD-10-CM

## 2023-08-30 LAB
BASOPHILS # BLD AUTO: 0.1 10E3/UL (ref 0–0.2)
BASOPHILS NFR BLD AUTO: 1 %
EOSINOPHIL # BLD AUTO: 0.4 10E3/UL (ref 0–0.7)
EOSINOPHIL NFR BLD AUTO: 6 %
ERYTHROCYTE [DISTWIDTH] IN BLOOD BY AUTOMATED COUNT: 14.5 % (ref 10–15)
HCT VFR BLD AUTO: 41.2 % (ref 35–47)
HGB BLD-MCNC: 12.7 G/DL (ref 11.7–15.7)
IMM GRANULOCYTES # BLD: 0 10E3/UL
IMM GRANULOCYTES NFR BLD: 0 %
LYMPHOCYTES # BLD AUTO: 2.3 10E3/UL (ref 0.8–5.3)
LYMPHOCYTES NFR BLD AUTO: 35 %
MCH RBC QN AUTO: 28 PG (ref 26.5–33)
MCHC RBC AUTO-ENTMCNC: 30.8 G/DL (ref 31.5–36.5)
MCV RBC AUTO: 91 FL (ref 78–100)
MONOCYTES # BLD AUTO: 0.6 10E3/UL (ref 0–1.3)
MONOCYTES NFR BLD AUTO: 10 %
NEUTROPHILS # BLD AUTO: 3.1 10E3/UL (ref 1.6–8.3)
NEUTROPHILS NFR BLD AUTO: 48 %
NRBC # BLD AUTO: 0 10E3/UL
NRBC BLD AUTO-RTO: 0 /100
PLAT MORPH BLD: NORMAL
PLATELET # BLD AUTO: 210 10E3/UL (ref 150–450)
RBC # BLD AUTO: 4.54 10E6/UL (ref 3.8–5.2)
RBC MORPH BLD: NORMAL
WBC # BLD AUTO: 6.5 10E3/UL (ref 4–11)

## 2023-08-30 PROCEDURE — 82785 ASSAY OF IGE: CPT

## 2023-08-30 PROCEDURE — 86003 ALLG SPEC IGE CRUDE XTRC EA: CPT

## 2023-08-30 PROCEDURE — 99214 OFFICE O/P EST MOD 30 MIN: CPT | Mod: GC

## 2023-08-30 PROCEDURE — 36415 COLL VENOUS BLD VENIPUNCTURE: CPT

## 2023-08-30 PROCEDURE — 85025 COMPLETE CBC W/AUTO DIFF WBC: CPT

## 2023-08-30 ASSESSMENT — ASTHMA QUESTIONNAIRES: ACT_TOTALSCORE: 9

## 2023-08-30 NOTE — PROGRESS NOTES
Assessment & Plan     Bronchiectasis without complication (H)  Moderate persistent asthma without complication  - Discussed with patient that we can obtain the lab testings that were recommended by her pulmonologist.  Patient appears to have upcoming pulmonology appointment on 11/15/2023.  As discussed with patient the importance of continuing her home medications including Symbicort, albuterol, flutter valve, and nasal spray.  Discussed with the patient the importance of scheduling a routine wellness examination so we can address preventative health.  Patient verbalized clear understanding and agreement to treatment plan and had no further questions or concerns at this time.  - IgE  - CBC with platelets differential  - Monson Resp Allergen Pane  -Asthma: ACT Score is 9.   -Discussed results of CT imaging of chest      Return in about 1 month (around 9/30/2023) for Routine preventive.    Presley Cristobal [facility] OTTO Bermudez is a 78 year old, presenting for the following health issues:  Results (Follow up on CT of lungs)      8/30/2023     8:31 AM   Additional Questions   Roomed by [facility]       HPI     -She reports that she is presenting to the clinic today for follow-up from her recent pulmonology visit on 8/9/2023.  She states she is looking to learn more about her CT imaging findings.  She reports that her chronic shortness of breath is about at her baseline.  She states that she has been adherent to her Symbicort, albuterol, flutter valve, and nasal sprays recommended by pulmonology.  She does state that her chronic shortness of breath does have an impact on her day-to-day quality of life.  She denies any headaches, fever, chills, nausea, vomiting, chest pain.  She reports no additional concerns.      Review of Systems   Constitutional, HEENT, cardiovascular, pulmonary, gi and gu systems are negative, except as otherwise noted.  ROS reviewed, see HPI for pertinent positive and  negatives.       Objective    BP (!) 157/79   Pulse 73   Temp 98.5  F (36.9  C) (Oral)   Resp 16   Wt 43.6 kg (96 lb 3.2 oz)   SpO2 98%   BMI 17.61 kg/m    Body mass index is 17.61 kg/m .  Physical Exam   GENERAL: healthy, alert and no distress  EYES: Eyes grossly normal to inspection, PERRL and conjunctivae and sclerae normal  RESP: lungs clear to auscultation - no rales, rhonchi or wheezes  CV: regular rate and rhythm, normal S1 S2, no S3 or S4, no murmur, click or rub, no peripheral edema and peripheral pulses strong  MS: no gross musculoskeletal defects noted, no edema  SKIN: no suspicious lesions or rashes on exposed skin  NEURO: Normal strength and tone, mentation intact and speech normal  PSYCH: mentation appears normal, affect normal/bright      ----- Service Performed and Documented by Resident or Fellow ------      Precepted with Dr. Chet Jain, DO

## 2023-08-30 NOTE — PATIENT INSTRUCTIONS
-Thank you for your visit  -You can proceed to the lab  -I recommend follow-up for routine wellness exam

## 2023-08-31 LAB — IGE SERPL-ACNC: 797 KU/L (ref 0–114)

## 2023-09-01 ENCOUNTER — TELEPHONE (OUTPATIENT)
Dept: PULMONOLOGY | Facility: CLINIC | Age: 78
End: 2023-09-01
Payer: COMMERCIAL

## 2023-09-01 DIAGNOSIS — J45.40 MODERATE PERSISTENT ASTHMA WITHOUT COMPLICATION: Primary | ICD-10-CM

## 2023-09-01 DIAGNOSIS — R76.8 ELEVATED IGE LEVEL: ICD-10-CM

## 2023-09-01 RX ORDER — MONTELUKAST SODIUM 10 MG/1
10 TABLET ORAL AT BEDTIME
Qty: 30 TABLET | Refills: 11 | Status: SHIPPED | OUTPATIENT
Start: 2023-09-01 | End: 2023-09-25

## 2023-09-01 NOTE — TELEPHONE ENCOUNTER
Called son Rema and gave him the recommendations/orders per Freda Mina CNP. He has no questions at this time.

## 2023-09-01 NOTE — TELEPHONE ENCOUNTER
----- Message from ANTHONY Smith CNP sent at 9/1/2023 11:16 AM CDT -----  Can you call and let them know the IgE was very elevated. I ordered Singulair so they can get that started before follow up. Let me know if they have questions, thanks!     ----- Message -----  From: Lab, Background User  Sent: 8/30/2023   3:58 PM CDT  To: ANTHONY Smith CNP

## 2023-09-01 NOTE — RESULT ENCOUNTER NOTE
Elevated IgE level. Will initiate montelukast (Singulair) 10mg daily at bedtime. If symptoms are still bothersome at follow up will refer to allergy.     Freda Mina, CNP  Pulmonary Medicine  [facility]   [phone]

## 2023-09-02 LAB
A ALTERNATA IGE QN: <0.1 KU(A)/L
A FUMIGATUS IGE QN: <0.1 KU(A)/L
BERMUDA GRASS IGE QN: 9.27 KU(A)/L
C HERBARUM IGE QN: <0.1 KU(A)/L
CAT DANDER IGG QN: <0.1 KU(A)/L
CEDAR IGE QN: 3.95 KU(A)/L
COMMON RAGWEED IGE QN: 7.04 KU(A)/L
COTTONWOOD IGE QN: 6.95 KU(A)/L
D FARINAE IGE QN: 0.93 KU(A)/L
D PTERONYSS IGE QN: 1.19 KU(A)/L
DOG DANDER+EPITH IGE QN: <0.1 KU(A)/L
IGE SERPL-ACNC: 728 KU/L (ref 0–114)
MAPLE IGE QN: 6.34 KU(A)/L
MARSH ELDER IGE QN: 8.59 KU(A)/L
MOUSE URINE PROT IGE QN: 7.38 KU(A)/L
NETTLE IGE QN: 5.8 KU(A)/L
P NOTATUM IGE QN: <0.1 KU(A)/L
ROACH IGE QN: 49.1 KU(A)/L
SALTWORT IGE QN: 10.7 KU(A)/L
SILVER BIRCH IGE QN: 1.64 KU(A)/L
TIMOTHY IGE QN: 11.7 KU(A)/L
WHITE ASH IGE QN: 9.91 KU(A)/L
WHITE ELM IGE QN: 7.07 KU(A)/L
WHITE MULBERRY IGE QN: 1.63 KU(A)/L
WHITE OAK IGE QN: 9.8 KU(A)/L

## 2023-09-23 DIAGNOSIS — J45.40 MODERATE PERSISTENT ASTHMA WITHOUT COMPLICATION: ICD-10-CM

## 2023-09-23 DIAGNOSIS — R76.8 ELEVATED IGE LEVEL: ICD-10-CM

## 2023-09-25 RX ORDER — MONTELUKAST SODIUM 10 MG/1
1 TABLET ORAL AT BEDTIME
Qty: 30 TABLET | Refills: 10 | Status: SHIPPED | OUTPATIENT
Start: 2023-09-25 | End: 2023-11-15

## 2023-10-11 ENCOUNTER — OFFICE VISIT (OUTPATIENT)
Dept: FAMILY MEDICINE | Facility: CLINIC | Age: 78
End: 2023-10-11
Payer: COMMERCIAL

## 2023-10-11 VITALS
OXYGEN SATURATION: 96 % | RESPIRATION RATE: 18 BRPM | TEMPERATURE: 98.8 F | WEIGHT: 96.6 LBS | BODY MASS INDEX: 18.24 KG/M2 | HEIGHT: 61 IN | DIASTOLIC BLOOD PRESSURE: 86 MMHG | HEART RATE: 84 BPM | SYSTOLIC BLOOD PRESSURE: 146 MMHG

## 2023-10-11 DIAGNOSIS — Z00.00 ENCOUNTER FOR MEDICARE ANNUAL WELLNESS EXAM: Primary | ICD-10-CM

## 2023-10-11 DIAGNOSIS — Z78.0 POSTMENOPAUSAL: ICD-10-CM

## 2023-10-11 DIAGNOSIS — M79.662 PAIN IN BOTH LOWER LEGS: ICD-10-CM

## 2023-10-11 DIAGNOSIS — M79.661 PAIN IN BOTH LOWER LEGS: ICD-10-CM

## 2023-10-11 DIAGNOSIS — J45.40 MODERATE PERSISTENT ASTHMA WITHOUT COMPLICATION: ICD-10-CM

## 2023-10-11 DIAGNOSIS — M81.0 AGE-RELATED OSTEOPOROSIS WITHOUT CURRENT PATHOLOGICAL FRACTURE: ICD-10-CM

## 2023-10-11 DIAGNOSIS — L85.3 DRY SKIN: ICD-10-CM

## 2023-10-11 DIAGNOSIS — Z23 NEED FOR PROPHYLACTIC VACCINATION AND INOCULATION AGAINST INFLUENZA: ICD-10-CM

## 2023-10-11 LAB
ERYTHROCYTE [DISTWIDTH] IN BLOOD BY AUTOMATED COUNT: 14 % (ref 10–15)
HCT VFR BLD AUTO: 36.6 % (ref 35–47)
HGB BLD-MCNC: 11.6 G/DL (ref 11.7–15.7)
MCH RBC QN AUTO: 28.5 PG (ref 26.5–33)
MCHC RBC AUTO-ENTMCNC: 31.7 G/DL (ref 31.5–36.5)
MCV RBC AUTO: 90 FL (ref 78–100)
PLATELET # BLD AUTO: 215 10E3/UL (ref 150–450)
RBC # BLD AUTO: 4.07 10E6/UL (ref 3.8–5.2)
WBC # BLD AUTO: 7.5 10E3/UL (ref 4–11)

## 2023-10-11 PROCEDURE — 84100 ASSAY OF PHOSPHORUS: CPT

## 2023-10-11 PROCEDURE — G0439 PPPS, SUBSEQ VISIT: HCPCS | Mod: GC

## 2023-10-11 PROCEDURE — 82728 ASSAY OF FERRITIN: CPT

## 2023-10-11 PROCEDURE — 82306 VITAMIN D 25 HYDROXY: CPT

## 2023-10-11 PROCEDURE — 85027 COMPLETE CBC AUTOMATED: CPT

## 2023-10-11 PROCEDURE — 36415 COLL VENOUS BLD VENIPUNCTURE: CPT

## 2023-10-11 PROCEDURE — 80053 COMPREHEN METABOLIC PANEL: CPT

## 2023-10-11 PROCEDURE — 90662 IIV NO PRSV INCREASED AG IM: CPT

## 2023-10-11 PROCEDURE — 82607 VITAMIN B-12: CPT

## 2023-10-11 PROCEDURE — 83735 ASSAY OF MAGNESIUM: CPT

## 2023-10-11 PROCEDURE — 84425 ASSAY OF VITAMIN B-1: CPT | Mod: 90

## 2023-10-11 PROCEDURE — G0008 ADMIN INFLUENZA VIRUS VAC: HCPCS

## 2023-10-11 PROCEDURE — 99214 OFFICE O/P EST MOD 30 MIN: CPT | Mod: 25

## 2023-10-11 PROCEDURE — 99000 SPECIMEN HANDLING OFFICE-LAB: CPT

## 2023-10-11 ASSESSMENT — ACTIVITIES OF DAILY LIVING (ADL)
CURRENT_FUNCTION: SHOPPING REQUIRES ASSISTANCE
CURRENT_FUNCTION: BATHING REQUIRES ASSISTANCE
CURRENT_FUNCTION: LAUNDRY REQUIRES ASSISTANCE
CURRENT_FUNCTION: PREPARING MEALS REQUIRES ASSISTANCE
CURRENT_FUNCTION: HOUSEWORK REQUIRES ASSISTANCE
CURRENT_FUNCTION: TRANSPORTATION REQUIRES ASSISTANCE

## 2023-10-11 ASSESSMENT — PATIENT HEALTH QUESTIONNAIRE - PHQ9: SUM OF ALL RESPONSES TO PHQ QUESTIONS 1-9: 9

## 2023-10-11 ASSESSMENT — ENCOUNTER SYMPTOMS
DIARRHEA: 0
FREQUENCY: 1
DYSURIA: 0
ARTHRALGIAS: 1
PALPITATIONS: 1
MYALGIAS: 1
BREAST MASS: 0
FEVER: 0
HEADACHES: 1
ABDOMINAL PAIN: 0
HEMATURIA: 0
DIZZINESS: 1
NERVOUS/ANXIOUS: 1
HEARTBURN: 0
SORE THROAT: 1
COUGH: 1
EYE PAIN: 1
CHILLS: 1
JOINT SWELLING: 1
PARESTHESIAS: 1
CONSTIPATION: 0
HEMATOCHEZIA: 0
NAUSEA: 0
SHORTNESS OF BREATH: 1
WEAKNESS: 1

## 2023-10-11 ASSESSMENT — ASTHMA QUESTIONNAIRES: ACT_TOTALSCORE: 23

## 2023-10-11 NOTE — COMMUNITY RESOURCES LIST (ENGLISH)
10/11/2023   [facility]  N/A  For questions about this resource list or additional care needs, please contact your primary care clinic or care manager.  Phone: [phone]   Email: N/A   Address: [address] [place] [address]   Hours: N/A        Food and Nutrition       Food pantry  1  Jayda GUTIERREZ [facility] Distance: 0.61 miles      Delivery, Pickup   [address] [place] [address]  Language: English, Zimbabwean  Hours: Mon 9:00 AM - 6:00 PM Appt. Only, Tue - Fri 9:00 AM - 5:00 PM Appt. Only  Fees: Free   Phone: (310) 768-4866 Email: info@[employer].org Website: http://www.[employer]under.org/site     2  [facility] [place] Distance: 1.43 miles      [facility]   [address] [place] [address]  Language: English, Hmong, Vietnamese, Zimbabwean  Hours: Mon 11:00 AM - 3:00 PM , Wed 11:00 AM - 3:00 PM , Fri 11:00 AM - 3:00 PM  Fees: Free, Self Pay   Phone: (353) 915-6516 Email: Madelin@[facility] [employer].org Website: http://[facility].org/[facility]/     SNAP application assistance  3  [employer] [place] Distance: 0.89 miles      Phone/Virtual   [address] [facility] [address] [place] [address]  Language: English, Hmong, Austrian, Jordanian, Zimbabwean  Hours: Mon - Fri 8:30 AM - 4:30 PM  Fees: Free   Phone: (300) 804-4341 Email: helpline@[employer].org Website: https://www.hungersolutions.org/programs/[place]-food-helpline/     4  [employer]CAP) [facility] Distance: 1.54 miles      Phone/Virtual   [address] [place] [address]  Language: English  Hours: Mon - Fri 8:00 AM - 4:30 PM  Fees: Free   Phone: (373) 781-5271 Email: info@caprw.org Website: http://www.caprw.org/     Soup kitchen or free meals  5  [facility] [place] [facility] - Free Summer Meals Distance: 1.3 miles      In-Person   [address] [facility] [place] [address]  Language:  English, Hmong, Bengali  Hours: Mon - Fri 12:00 PM - 1:00 PM , Mon - Fri 3:00 PM - 4:00 PM  Fees: Free   Phone: (702) 531-5878 Email: Kasi@[facility]. Website: https://www.[facility]/departments/patterson-recreation/[facility]     6  [facility] [place] [facility] Distance: 2.06 miles      [address] [address]  Language: English  Hours: Tue 2:00 PM - 4:00 PM , Thu 2:00 PM - 4:00 PM  Fees: Free   Phone: (662) 737-2957 Email: lopez@[facility]. Website: https://www.[facility]/facilities/ridcyl-zuisbotdj-vvuusz          Important Numbers & Websites       Emergency Services   911  [facility]   311  Poison Control   (986) 363-2855  Suicide Prevention Lifeline   (860) 296-2850 (TALK)  Child Abuse Hotline   (239) 150-5126 (4-A-Child)  Sexual Assault Hotline   (739) 475-8543 (HOPE)  National Runaway Safeline   (662) 237-8619 (RUNAWAY)  All-Options Talkline   (543) 236-6372  Substance Abuse Referral   (751) 708-7204 (HELP)

## 2023-10-11 NOTE — COMMUNITY RESOURCES LIST (PATIENT PREFERRED LANGUAGE)
10/11/2023   [facility]  N/A  N?u có th?c m?c v? paty kailyn moore ngkarely này ho?c các tamra c?u ch?m sóc b? sung, vui lòng liên h? v?i phòng khám ch?m sóc chính ho?c ng??i qu?n lý ch?m sóc c?a b?n.  Phone: [phone]   Email: N/A   Address: [address] [place] [address]   Hours: N/A        Th?c ph?m và lauryn d??ng       phòng ??ng th?c ?n  1  Jonathan tâm c?ng ??ng Candelaria Roemro. Lisy?ng cách: 0.61 d?m      V?n elenita?n, Nh?t lên   [address] [place] [address]  Ngôn ng?: ng??i Tây Ban Nha, Ti?ng Natalia  Gi?: Th? mary anne 9:00 SA - 6:00 CH ?ng d?ng. Ch? m?t, Th? ba - Th? sáu 9:00 SA - 5:00 CH ?ng d?ng. Ch? m?t  Phí: Mi?n phí   Phone: [phone] Email: info@[facility].org Website: http://www.[employer].net.org/site     2  C?u Qu?c Quân - Jonathan Tâm Th? Cúng Và D?ch V? Kinh Thành Lisy?ng cách: 1.43 d?m      Nh?t lên   [address] [place] [address]  Ngôn ng?: ng??i Mông, ng??i Tây Ban Nha, Ti?ng Natalia, ti?ng Lào  Gi?: Th? mary anne 11:00 SA - 3:00 CH , Th? t? 11:00 SA - 3:00 CH , Th? sáu 11:00 SA - 3:00 CH  Phí: Mi?n phí, T? tr?   Phone: [phone]) [phone] Email: Madelin@[facility].salvationarmy.org Website: http://salvationarmynorth.org/community/ql-bigp-o-[facility]/     H? tr? ?ng d?ng SNAP  3  Gi?i pháp gi?i bc?t n?n ?ói [place] Lisy?ng cách: 0.89 d?m      ?i?n tho?i/?o   [address] [place] [address]  Ngôn ng?: ng??i Mông, ng??i Tây Ban Nha, Ti?ng Natalia, ti?ng Sherrie, ti?ng [place] Timorese  Gi?: Th? mary anne - Th? sáu 8:30 SA - 4:30 CH  Phí: R?nh r?i   Phone: [phone] Email: helpline@[employer].org Website: https://www.hungersoFibersparions.org/programs/[place]-food-helpline/     4  Lukas h? ??i tác hành ??ng c?ng ??ng (CAP) c?a các qu?n [facility] Lisy?ng cách: 1.54 d?m      ?i?n tho?i/?o   [address] [place] [address]  Ngôn ng?: Ti?ng Natalia  Gi?: Th? hai - Th? sáu 8:00 SA - 4:30 CH  Phí: Mi?n phí   Phone: [phone] Email: info@[employer]rOyster.com.org Website:  http://www.caprw.org/     B?p súp ho?c b?a ?n mi?n phí  5  Thành ph? [place] [facility] tâm c?ng ??ng Marfa - B?a ?n mùa hè mi?n phí Lisy?ng cách: 1.3 d?m      M?t ??i m?t   [address] [place] [address]  Ngôn ng?: ng??i Mông, ng??i Tây Ban Nha, Ti?ng Natalia  Gi?: Th? mary anne - Th? sáu 12:00 CH - 1:00 CH , Th? mary anne - Th? sáu 3:00 CH - 4:00 CH  Phí: Mi?n phí   Phone: [phone] Email: Kasi@[email or web address]. Website: https://www.[facility]/departments/patterson-recreation/[facility]     6  Thành ph? [place] tâm c?ng ??ng [place] Lisy?ng cách: 2.06 d?m      Nh?t lên   [address] [place] [address]  Ngôn ng?: Ti?ng Natalia  Gi?: Th? ba 2:00 CH - 4:00 CH , Th? n?m 2:00 CH - 4:00 CH  Phí: Mi?n phí   Phone: [phone] Email: [email or web address]@[email or web address]. Website: https://www.[facility]/facilities/gkdfhh-fhhggbrvw-jqupkl          Nh?ng con s? và jessica web chago tr?ng       Các d?ch v? kh?n c?p   911  D?ch v? thành ph?   311  Ki?m soát ??c   [phone]  ???ng dây phòng ch?ng t? t?   [phone] (TALK)  ???ng dây nóng l?m d?ng tr? em   [phone] (4-A-Child)  ???ng dây nóng t?n công tình d?c   [phone] (HOPE)  ???ng dây ch?y tr?n qu?c delicia   [phone] (RUNAWAY)  T?t c? các tùy ch?n Talkline   [phone]  Gi?i thi?u l?m d?ng d??c ch?t   [phone] (HELP)

## 2023-10-11 NOTE — PROGRESS NOTES
"SUBJECTIVE:   Lara is a 78 year old who presents for Preventive Visit.      10/11/2023     1:55 PM   Additional Questions   Roomed by marquita   Accompanied by self and son       Are you in the first 12 months of your Medicare coverage?  No    HPI    In general, how would you rate your overall physical health? fair   Frequency of exercise: 1 day/week   Do you usually eat at least 4 servings of fruit and vegetables a day, include whole grains & fiber, and avoid regularly eating high fat or \"junk\" foods? Yes   Taking medications regularly: Yes   Medication side effects: None   Activities of Daily Living transportation requires assistance, shopping requires assistance, preparing meals requires assistance, housework requires assistance, bathing requires assistance, laundry requires assistance   Home safety no safety concerns identified   Hearing Impairment: no hearing concerns   In general, how would you rate your overall mental or emotional health? fair   Additional concerns today: No   Duration of exercise Less than 15 minutes       Today's PHQ-2 Score:       10/11/2023     1:57 PM   PHQ-2 ( 1999 Pfizer)   Q1: Little interest or pleasure in doing things 3   Q2: Feeling down, depressed or hopeless 3   PHQ-2 Score 6     PATIENT HEALTH QUESTIONNAIRE-9 (PHQ - 9)    Over the last 2 weeks, how often have you been bothered by any of the following problems?    1. Little interest or pleasure in doing things -  More than half the days   2. Feeling down, depressed, or hopeless -  Not at all   3. Trouble falling or staying asleep, or sleeping too much - Several days   4. Feeling tired or having little energy -  Several days   5. Poor appetite or overeating -  Several days   6. Feeling bad about yourself - or that you are a failure or have let yourself or your family down -  Several days   7. Trouble concentrating on things, such as reading the newspaper or watching television - Several days   8. Moving or speaking so slowly that other " people could have noticed? Or the opposite - being so fidgety or restless that you have been moving around a lot more than usual Several days   9. Thoughts that you would be better off dead or of hurting  yourself in some way Several days   Total Score: 9     If you checked off any problems, how difficult have these problems made it for you to do your work, take care of things at home, or get along with other people? Very difficult    Developed by Mirta Solis, Jeanne Saab, Maximus Freitas and colleagues, with an educational anjelica from [employer]. No permission required to reproduce, translate, display or distribute. permission required to reproduce, translate, display or distribute.       Have you ever done Advance Care Planning? (For example, a Health Directive, POLST, or a discussion with a medical provider or your loved ones about your wishes): No, advance care planning information given to patient to review.  Patient plans to discuss their wishes with loved ones or provider.      Hearing grossly intact     Fall risk  Fallen 2 or more times in the past year?: Yes  Any fall with injury in the past year?: No  - Uses cane. Living with daughter.    Cognitive Screening   1) Repeat 3 items (Purse, Flower, Table): 3/3  2) Clock draw: NORMAL  3) 3 item recall: Recalls 1 object   Results: NORMAL clock, 1-2 items recalled: COGNITIVE IMPAIRMENT LESS LIKELY    Mini-CogTM Copyright S Carlos. Licensed by the author for use in [facility]; reprinted with permission (susan@.[facility]). All rights reserved.      Reviewed and updated as needed this visit by clinical staff   Tobacco  Allergies  Meds  Problems  Med Hx  Surg Hx  Fam Hx          Reviewed and updated as needed this visit by Provider   Tobacco    Problems  Med Hx  Surg Hx  Fam Hx         Social History     Tobacco Use    Smoking status: Never    Smokeless tobacco: Never   Substance Use Topics    Alcohol use: No         10/11/2023      1:47 PM   Alcohol Use   Prescreen: >3 drinks/day or >7 drinks/week? No     Do you have a current opioid prescription? No  Do you use any other controlled substances or medications that are not prescribed by a provider? None    Current providers sharing in care for this patient include:   Patient Care Team:  Sofía Biggs MD as Assigned Pulmonology Provider    The following health maintenance items are reviewed in Epic and correct as of today:  Health Maintenance   Topic Date Due    ZOSTER IMMUNIZATION (1 of 2) Never done    RSV VACCINE 60+ (1 - 1-dose 60+ series) Never done    ASTHMA ACTION PLAN  06/25/2020    COVID-19 Vaccine (5 - 2023-24 season) 09/01/2023    ASTHMA CONTROL TEST  04/11/2024    LIPID  06/25/2024    MEDICARE ANNUAL WELLNESS VISIT  10/11/2024    FALL RISK ASSESSMENT  10/11/2024    DTAP/TDAP/TD IMMUNIZATION (5 - Td or Tdap) 05/17/2027    ADVANCE CARE PLANNING  10/12/2028    DEXA  05/09/2031    HEPATITIS C SCREENING  Completed    PHQ-2 (once per calendar year)  Completed    INFLUENZA VACCINE  Completed    Pneumococcal Vaccine: 65+ Years  Completed    IPV IMMUNIZATION  Aged Out    HPV IMMUNIZATION  Aged Out    MENINGITIS IMMUNIZATION  Aged Out    MAMMO SCREENING  Discontinued    COLORECTAL CANCER SCREENING  Discontinued     Last mammogram completed in 2015 and was negative.  Age out of mammogram screenings at the age of 76.  Imaging was not ordered today.  - Pertinent mammograms are reviewed under the imaging tab.    Last Pap smear completed in 2012 and was negative.  Patient has aged out of Pap smear testing at the age of 67.  Imaging was not ordered today.    Last colonoscopy completed 9/1/2022 after positive FIT.  However, bowel preparation was inadequate.  Recommended colonoscopy at later date.  Patient is now out of age range for screening.  Patient does not want to test.    Review of Systems  In the past 6 months, have you been bothered by leaking of urine? No   abdominal pain No   Blood in stool  "No   Blood in urine No   chest pain Yes   chills Yes   congestion No   constipation No   cough Yes   diarrhea No   dizziness Yes   ear pain No   eye pain Yes   nervous/anxious Yes   fever No   frequency Yes   genital sores No   headaches Yes   hearing loss No   heartburn No   arthralgias Yes   joint swelling Yes   peripheral edema Yes   mood changes Yes   myalgias Yes   nausea No   dysuria No   palpitations Yes   Skin sensation changes Yes   sore throat Yes   urgency Yes   rash No   shortness of breath Yes   visual disturbance Yes   weakness Yes   pelvic pain No   vaginal bleeding No   vaginal discharge No   tenderness No   breast mass No   breast discharge No       OBJECTIVE:   BP (!) 146/86   Pulse 84   Temp 98.8  F (37.1  C) (Oral)   Resp 18   Ht 1.56 m (5' 1.42\")   Wt 43.8 kg (96 lb 9.6 oz)   SpO2 96%   BMI 18.01 kg/m   Estimated body mass index is 18.01 kg/m  as calculated from the following:    Height as of this encounter: 1.56 m (5' 1.42\").    Weight as of this encounter: 43.8 kg (96 lb 9.6 oz).    Physical Exam  GENERAL: alert and no distress, pleasant, frail  EYES: Eyes grossly normal to inspection, cataracts, PERRL and conjunctivae and sclerae normal  HENT: ear canals and TM's normal, nose and mouth without ulcers or lesions  NECK: no adenopathy, no asymmetry, masses, or scars and thyroid normal to palpation  RESP: lungs clear to auscultation - no rales, rhonchi or wheezes  CV: regular rate and rhythm, normal S1 S2, no S3 or S4, no murmur, click or rub, no peripheral edema  ABDOMEN: soft, nontender, no hepatosplenomegaly, no masses and bowel sounds normal  MS: Shuffling gait, uses cane, 4/5 strength throughout  SKIN: dry and cracked skin between the fingers  NEURO: Decreased strength and tone, decreased sensation in right dorsal surface of foot, mentation intact and speech normal  PSYCH: mentation appears normal, affect normal/bright  Right leg numbness at ankle and below.     Diagnostic Test " Results:  Labs reviewed in Epic    ASSESSMENT / PLAN:   (Z00.00) Encounter for Medicare annual wellness exam  (primary encounter diagnosis)  Comments: Patient uses cane with a shuffling gait. No current concerns about memory. Living with daughter. States she is eating well but BMI is 18.01.  Plan: Comprehensive metabolic panel, CBC with         platelets, Ferritin, Vitamin B1 whole blood    (M79.661,  M79.662) Pain in both lower legs  (M81.0) Age-related osteoporosis without current pathological fracture  (Z78.0) Postmenopausal  Comment: DEXA scan last completed in 2016 showing osteoporosis.  Currently on alendronate. Possibly restless leg syndrome, worsening bone health,   Plan: Dexa hip/pelvis/spine, Vitamin B12, Vitamin D deficiency screening, Phosphorus, Magnesium  - Consider MRI if patient continues to have pain    (J45.40) Moderate persistent asthma without complication  Comment: ACT 23.  Patient states she uses Symbicort and albuterol as needed.  Patient should use Symbicort as rescue inhaler as well.  Plan: Continue Symbicort.    (Z23) Need for prophylactic vaccination and inoculation against influenza  Plan: Influenza vaccination    (L85.3) Dry skin  Plan: continue using Vaseline as needed.      COUNSELING:  Reviewed preventive health counseling, as reflected in patient instructions       Regular exercise       Vision screening       Dental care       Bladder control       Fall risk prevention       Immunizations  Vaccinated for: Influenza    Recommend talking to pharmacy about RSV vaccination and shingles vaccines due to not being available at the clinic today.  Declined vaccination for COVID-19        She reports that she has never smoked. She has never used smokeless tobacco.      Appropriate preventive services were discussed with this patient, including applicable screening as appropriate for fall prevention, nutrition, physical activity, Tobacco-use cessation, weight loss and cognition.  Checklist  reviewing preventive services available has been given to the patient.    Reviewed patients plan of care and provided an AVS. The Intermediate Care Plan ( asthma action plan, low back pain action plan, and migraine action plan) for Lara meets the Care Plan requirement. This Care Plan has been established and reviewed with the Patient.      Patient was staffed with supervising physician, Dr. Silverio Somers  .    Inga Richmond MD  [facility]    Identified Health Risks:  She is at risk for falling and has been provided with information to reduce the risk of falling at home.

## 2023-10-11 NOTE — COMMUNITY RESOURCES LIST (PATIENT PREFERRED LANGUAGE)
10/11/2023   [facility]  N/A  N?u có th?c m?c v? paty kailyn moore ngkarely này ho?c các tamra c?u ch?m sóc b? sung, vui lòng liên h? v?i phòng khám ch?m sóc chính ho?c ng??i qu?n lý ch?m sóc c?a b?n.  Phone: [phone]   Email: N/A   Address: [address] [place] [address]   Hours: N/A        Th?c ph?m và lauryn d??ng       phòng ??ng th?c ?n  1  Jonathan tâm c?ng ??ng Candelaria Romero. Lisy?ng cách: 0.61 d?m      V?n elenita?n, Nh?t lên   [address] [place] [address]  Ngôn ng?: ng??i Tây Ban Nha, Ti?ng Natalia  Gi?: Th? mary anne 9:00 SA - 6:00 CH ?ng d?ng. Ch? m?t, Th? ba - Th? sáu 9:00 SA - 5:00 CH ?ng d?ng. Ch? m?t  Phí: Mi?n phí   Phone: [phone] Email: [email or web address].org Website: http://www.[email or web address].cn.org/site     2  C?u Qu?c Quân - Jonathan Tâm Th? Cúng Và D?ch V? Kinh Thành Lisy?ng cách: 1.43 d?m      Nh?t lên   [address] [place] [address]  Ngôn ng?: ng??i Mông, ng??i Tây Ban Nha, Ti?ng Natalia, ti?ng Lào  Gi?: Th? mary anne 11:00 SA - 3:00 CH , Th? t? 11:00 SA - 3:00 CH , Th? sáu 11:00 SA - 3:00 CH  Phí: Mi?n phí, T? tr?   Phone: [phone] Email: [email or web address].salvationarmy.org Website: http://salvationarmynorth.org/community/hx-jgum-f-[facility]/     H? tr? ?ng d?ng SNAP  3  Gi?i pháp gi?i bc?t n?n ?ói [place] Lisy?ng cách: 0.89 d?m      ?i?n tho?i/?o   [address] [place] [address]  Ngôn ng?: ng??i Mông, ng??i Tây Ban Nha, Ti?ng Natalia, ti?ng Sherrie, ti?ng Ugandan  Gi?: Th? mary anne - Th? sáu 8:30 SA - 4:30 CH  Phí: R?nh r?i   Phone: [phone] Email: helpline@[email or web address] Website: https://www.hungersoiPositionions.org/programs/[place]-food-helpline/     4  Lukas h? ??i tác hành ??ng c?ng ??ng (CAP) c?a các qu?n [facility] Lisy?ng cách: 1.54 d?m      ?i?n tho?i/?o   [address] [place] [address]  Ngôn ng?: Ti?ng Natalia  Gi?: Th? hai - Th? sáu 8:00 SA - 4:30 CH  Phí: Mi?n phí   Phone: [phone] Email: info@[employer].org Website:  http://www.caprw.org/     B?p súp ho?c b?a ?n mi?n phí  5  Thành ph? [facility] tâm c?ng ??ng [place] - B?a ?n mùa hè mi?n phí Lisy?ng cách: 1.3 d?m      M?t ??i m?t   [address] [place] [address]  Ngôn ng?: ng??i Mông, ng??i Tây Ban Nha, Ti?ng Natalia  Gi?: Th? mary anne - Th? sáu 12:00 CH - 1:00 CH , Th? mary anne - Th? sáu 3:00 CH - 4:00 CH  Phí: Mi?n phí   Phone: [phone] Email: Kasi@[email or web address]. Website: https://www.[facility]/departments/patterson-recreation/[place]     6  Thành ph? [place] [facility] [place] tâm c?ng ??ng [place] Lisy?ng cách: 2.06 d?m      Nh?t lên   [address] [place] [address]  Ngôn ng?: Ti?ng Natalia  Gi?: Th? ba 2:00 CH - 4:00 CH , Th? n?m 2:00 CH - 4:00 CH  Phí: Mi?n phí   Phone: [phone] Email: [email or web address]. Website: https://www.[facility]/facilities/zqydez-ixsymfsvg-lljzfs          Nh?ng con s? và jessica web chago tr?ng       Các d?ch v? kh?n c?p   911  D?ch v? thành ph?   311  Ki?m soát ??c   [phone]  ???ng dây phòng ch?ng t? t?   [phone] (TALK)  ???ng dây nóng l?m d?ng tr? em   [phone] (4-A-Child)  ???ng dây nóng t?n công tình d?c   [phone] (HOPE)  ???ng dây ch?y tr?n qu?c delicia   [phone] (RUNAWAY)  T?t c? các tùy ch?n Talkline   [phone]  Gi?i thi?u l?m d?ng d??c ch?t   [phone] (HELP)

## 2023-10-11 NOTE — COMMUNITY RESOURCES LIST (ENGLISH)
10/11/2023   [facility]  N/A  For questions about this resource list or additional care needs, please contact your primary care clinic or care manager.  Phone: [phone]   Email: N/A   Address: [address] [place] [address]   Hours: N/A        Food and Nutrition       Food pantry  1  Jayda GUTIERREZ [facility] Distance: 0.61 miles      Delivery, Pickup   [address] [place] [address]  Language: English, Cay[place] Islander  Hours: Mon 9:00 AM - 6:00 PM Appt. Only, Tue - Fri 9:00 AM - 5:00 PM Appt. Only  Fees: Free   Phone: [phone] Email: info@[employer].org Website: http://www.[employer].org/site     2  [facility] [place] Distance: 1.43 miles      [facility]   [address] [place] [address]  Language: English, Hmong, Cymro, [place] Islander  Hours: Mon 11:00 AM - 3:00 PM , Wed 11:00 AM - 3:00 PM , Fri 11:00 AM - 3:00 PM  Fees: Free, Self Pay   Phone: [phone]) [phone] Email: Madelin@[facility]vineProsperity Financial Services [employer]te Ltdy.org Website: http://[facility].org/[facility]/     SNAP application assistance  3  [employer] [place] Distance: 0.89 miles      Phone/Virtual   [address] [facility] [address] [place] [address]  Language: English, Hmong, Guatemalan, South Sudanese, [place] Islander  Hours: Mon - Fri 8:30 AM - 4:30 PM  Fees: Free   Phone: [phone]) [phone] Email: helpline@[employer].org Website: https://www.hungersolutions.org/programs/[place]-food-helpline/     4  [employer] (CAP) [facility] Distance: 1.54 miles      Phone/Virtual   [address] [place] [address]  Language: English  Hours: Mon - Fri 8:00 AM - 4:30 PM  Fees: Free   Phone: [phone]) 605-4162 Email: info@caprw.org Website: http://www.caprw.org/     Soup kitchen or free meals  5  [facility] [place] [facility] - Free Summer Meals Distance: 1.3 miles      In-Person   [address] [place] [address]  Language:  English, Hmong, Mongolian  Hours: Mon - Fri 12:00 PM - 1:00 PM , Mon - Fri 3:00 PM - 4:00 PM  Fees: Free   Phone: [phone]) [phone] Email: Kasi@[facility]. Website: https://www.[facility]/departments/patterson-recreation/[place]     6  [facility] [place] [facility] Distance: 2.06 miles      [address] [address]  Language: English  Hours: Tue 2:00 PM - 4:00 PM , Thu 2:00 PM - 4:00 PM  Fees: Free   Phone: [phone]) 322-2522 Email: lopez@[facility]. Website: https://www.[facility]/facilities/aohcfi-ezrvqnbce-daxoed          Important Numbers & Websites       Emergency Services   911  [facility]   311  Poison Control   [phone] [phone]  Suicide Prevention Lifeline   (326) 556-5640 (TALK)  Child Abuse Hotline   (764) 640-7407 (4-A-Child)  Sexual Assault Hotline   (325) 741-5435 (HOPE)  National Runaway Safeline   (528) 356-1317 (RUNAWAY)  All-Options Talkline   (355) 435-3707  Substance Abuse Referral   [phone] (HELP)

## 2023-10-11 NOTE — PATIENT INSTRUCTIONS
Patient Education   Personalized Prevention Plan  You are due for the preventive services outlined below.  Your care team is available to assist you in scheduling these services.  If you have already completed any of these items, please share that information with your care team to update in your medical record.  Health Maintenance Due   Topic Date Due     Zoster (Shingles) Vaccine (1 of 2) Never done     RSV VACCINE 60+ (1 - 1-dose 60+ series) Never done     Asthma Action Plan - yearly  06/25/2020     COVID-19 Vaccine (5 - Additional dose for Vipin series) 04/06/2023     Annual Wellness Visit  06/29/2023     Flu Vaccine (1) 09/01/2023     Preventing Falls: Care Instructions    Talk to your doctor about the medicines you take. Ask if any of them increase the risk of falls and whether they can be changed or stopped.   Try to exercise regularly. It can help improve your strength and balance. This can help lower your risk of falling.     Practice fall safety and prevention.    Wear low-heeled shoes that fit well and give your feet good support. Talk to your doctor if you have foot problems that make this hard.  Carry a cellphone or wear a medical alert device that you can use to call for help.  Use stepladders instead of chairs to reach high objects. Don't climb if you're at risk for falls. Ask for help, if needed.  Wear the correct eyeglasses, if you need them.    Make your home safer.    Remove rugs, cords, clutter, and furniture from walkways.  Keep your house well lit. Use night-lights in hallways and bathrooms.  Install and use sturdy handrails on stairways.  Wear nonskid footwear, even inside. Don't walk barefoot or in socks without shoes.    Be safe outside.    Use handrails, curb cuts, and ramps whenever possible.  Keep your hands free by using a shoulder bag or backpack.  Try to walk in well-lit areas. Watch out for uneven ground, changes in pavement, and debris.  Be careful in the winter. Walk on the grass  "or gravel when sidewalks are slippery. Use de-icer on steps and walkways. Add non-slip devices to shoes.    Put grab bars and nonskid mats in your shower or tub and near the toilet. Try to use a shower chair or bath bench when bathing.   Get into a tub or shower by putting in your weaker leg first. Get out with your strong side first. Have a phone or medical alert device in the bathroom with you.   Where can you learn more?  Go to https://www.Proud[employer]TV.net/patiented  Enter G117 in the search box to learn more about \"Preventing Falls: Care Instructions.\"  Current as of: November 9, 2022               Content Version: 13.7    [id number] [employer].   Care instructions adapted under license by your healthcare professional. If you have questions about a medical condition or this instruction, always ask your healthcare professional. [employer] disclaims any warranty or liability for your use of this information.      How to Get Up Safely After a Fall: Care Instructions  Overview     If you have injuries, health problems, or other reasons that may make it easy for you to fall at home, it is a good idea to learn how to get up safely after a fall. Learning how to get up correctly can help you avoid making an injury worse.  Also, knowing what to do if you cannot get up can help you stay safe until help arrives.  Follow-up care is a key part of your treatment and safety. Be sure to make and go to all appointments, and call your doctor if you are having problems. It's also a good idea to know your test results and keep a list of the medicines you take.  How can you care for yourself after a fall?  If you think you can get up  First lie still for a few minutes and think about how you feel. If your body feels okay and you think you can get up safely, follow the rest of the steps below:  Look for a chair or other piece of furniture that is close to you.  Roll onto your side and rest. Roll by turning " your head in the direction you want to roll, move your shoulder and arm, then hip and leg in the same direction.  Lie still for a moment to let your blood pressure adjust.  Slowly push your upper body up, lift your head, and take a moment to rest.  Slowly get up on your hands and knees, and crawl to the chair or other stable piece of furniture.  Put your hands on the chair.  Move one foot forward, and place it flat on the floor. Your other leg should be bent with the knee on the floor.  Rise slowly, turn your body, and sit in the chair. Stay seated for a bit and think about how you feel. Call for help. Even if you feel okay, let someone know what happened to you. You might not know that you have a serious injury.  If you cannot get up  If you think you are injured after a fall or you cannot get up, try not to panic.  Call out for help.  If you have a phone within reach or you have an emergency call device, use it to call for help.  If you do not have a phone within reach, try to slide yourself toward it. If you cannot get to the phone, try to slide toward a door or window or a place where you think you can be heard.  Loving or use an object to make noise so someone might hear you.  If you can reach something that you can use for a pillow, place it under your head. Try to stay warm by covering yourself with a blanket or clothing while you wait for help.  When should you call for help?   Call 911 anytime you think you may need emergency care. For example, call if:    You passed out (lost consciousness).     You cannot get up after a fall.     You have severe pain.   Call your doctor now or seek immediate medical care if:    You have new or worse pain.     You are dizzy or lightheaded.     You hit your head.   Watch closely for changes in your health, and be sure to contact your doctor if:    You do not get better as expected.   Where can you learn more?  Go to https://www.healthwise.net/patiented  Enter G513 in the  "search box to learn more about \"How to Get Up Safely After a Fall: Care Instructions.\"  Current as of: November 14, 2022               Content Version: 13.7 2006-2023 Touchbase.   Care instructions adapted under license by your healthcare professional. If you have questions about a medical condition or this instruction, always ask your healthcare professional. Healthwise, [employer] disclaims any warranty or liability for your use of this information.         "

## 2023-10-11 NOTE — COMMUNITY RESOURCES LIST (PATIENT PREFERRED LANGUAGE)
10/11/2023   [facility]  N/A  N?u có th?c m?c v? paty kailyn moore ngkarely này ho?c các tamra c?u ch?m sóc b? sung, vui lòng liên h? v?i phòng khám ch?m sóc chính ho?c ng??i qu?n lý ch?m sóc c?a b?n.  Phone: [phone]   Email: N/A   Address: [address] [place] [address]   Hours: N/A        Th?c ph?m và lauryn d??ng       phòng ??ng th?c ?n  1  Jonathan tâm c?ng ??ng Candelaria Romero. Lisy?ng cách: 0.61 d?m      V?n elenita?n, Nh?t lên   [address] [place] [address]  Ngôn ng?: ng??i Tây Ban Nha, Ti?ng Natalia  Gi?: Th? mary anne 9:00 SA - 6:00 CH ?ng d?ng. Ch? m?t, Th? ba - Th? sáu 9:00 SA - 5:00 CH ?ng d?ng. Ch? m?t  Phí: Mi?n phí   Phone: [phone] Email: [email or web address] Website: http://[email or web address].org/site     2  C?u Qu?c Quân - Jonathan Tâm Th? Cúng Và D?ch V? Kinh Thành Lisy?ng cách: 1.43 d?m      Nh?t lên   [address] [place] [address]  Ngôn ng?: ng??i Mông, ng??i Tây Ban Nha, Ti?ng Natalia, ti?ng Lào  Gi?: Th? mary anne 11:00 SA - 3:00 CH , Th? t? 11:00 SA - 3:00 CH , Th? sáu 11:00 SA - 3:00 CH  Phí: Mi?n phí, T? tr?   Phone: [phone] Email: Madelin@[email or web address].salvationarmy.org Website: http://salvationarmynorth.org/community/eg-gjwp-t-[facility]/     H? tr? ?ng d?ng SNAP  3  Gi?i pháp gi?i bc?t n?n ?ói [place] Lisy?ng cách: 0.89 d?m      ?i?n tho?i/?o   [address] [place] [address]  Ngôn ng?: ng??i Mông, ng??i Tây Ban Nha, Ti?ng Natalia, ti?ng Sherrie, ti?ng Pakistani  Gi?: Th? mary anne - Th? sáu 8:30 SA - 4:30 CH  Phí: R?nh r?i   Phone: [phone] Email: helpline@[facility] [employer].org Website: https://www.hungerso[facility]opsions.org/programs/[place]-food-helpline/     4  Lukas h? ??i tác hành ??ng c?ng ??ng (CAP) c?a các qu?n [facility] Lisy?ng cách: 1.54 d?m      ?i?n tho?i/?o   [address] [place] [address]  Ngôn ng?: Ti?ng Natalia  Gi?: Th? hai - Th? sáu 8:00 SA - 4:30 CH  Phí: Mi?n phí   Phone: [phone] Email: info@[employer].org Website:  http://www.caprw.org/     B?p súp ho?c b?a ?n mi?n phí  5  Thành ph? [facility] tâm c?ng ??ng [place] - B?a ?n mùa hè mi?n phí Lisy?ng cách: 1.3 d?m      M?t ??i m?t   [address] [place] [address]  Ngôn ng?: ng??i Mông, ng??i Tây Ban Nha, Ti?ng Natalia  Gi?: Th? mary anne - Th? sáu 12:00 CH - 1:00 CH , Th? mary anne - Th? sáu 3:00 CH - 4:00 CH  Phí: Mi?n phí   Phone: [phone] Email: Kasi@[email or web address]. Website: https://www.[facility]/departments/patterson-recreation/[facility]     6  Thành ph? [place] [facility] [place] tâm c?ng ??ng [place] Lisy?ng cách: 2.06 d?m      Nh?t lên   [address] [place] [address]  Ngôn ng?: Ti?ng Natalia  Gi?: Th? ba 2:00 CH - 4:00 CH , Th? n?m 2:00 CH - 4:00 CH  Phí: Mi?n phí   Phone: [phone] Email: [email or web address]. Website: https://www.[facility]/facilities/jonxkw-xahxtshlt-lxtegu          Nh?ng con s? và jessica web chago tr?ng       Các d?ch v? kh?n c?p   911  D?ch v? thành ph?   311  Ki?m soát ??c   [phone]  ???ng dây phòng ch?ng t? t?   [phone] (TALK)  ???ng dây nóng l?m d?ng tr? em   [phone] (4-A-Child)  ???ng dây nóng t?n công tình d?c   [phone] (HOPE)  ???ng dây ch?y tr?n qu?c delicia   [phone] (RUNAWAY)  T?t c? các tùy ch?n Talkline   [phone]  Gi?i thi?u l?m d?ng d??c ch?t   [phone] (HELP)

## 2023-10-11 NOTE — COMMUNITY RESOURCES LIST (ENGLISH)
10/11/2023   [facility]  N/A  For questions about this resource list or additional care needs, please contact your primary care clinic or care manager.  Phone: [phone]   Email: N/A   Address: [address] [place] [address]   Hours: N/A        Food and Nutrition       Food pantry  1  Jayda GUTIERREZ [facility] Distance: 0.61 miles      Delivery, Pickup   [address] [place] [address]  Language: English, Taiwanese  Hours: Mon 9:00 AM - 6:00 PM Appt. Only, Tue - Fri 9:00 AM - 5:00 PM Appt. Only  Fees: Free   Phone: [phone] Email: info@[employer].org Website: http://www.[employer].org/site     2  [facility] [place] Distance: 1.43 miles      [facility]   [address] [place] [address]  Language: English, Hmong, Liberian, Taiwanese  Hours: Mon 11:00 AM - 3:00 PM , Wed 11:00 AM - 3:00 PM , Fri 11:00 AM - 3:00 PM  Fees: Free, Self Pay   Phone: [phone]) [phone] Email: Madelin@[facility].org Website: http://[facility].org/[facility]/     SNAP application assistance  3  [employer] [place] Distance: 0.89 miles      Phone/Virtual   [address] [facility] [address] [place] [address]  Language: English, Hmong, Vincentian, Sammarinese, Taiwanese  Hours: Mon - Fri 8:30 AM - 4:30 PM  Fees: Free   Phone: (438) [phone] Email: helpline@[employer].org Website: https://www.hungersolutions.org/programs/[place]-food-helpline/     4  [employer]) [place] Distance: 1.54 miles      Phone/Virtual   [address] [place] [address]  Language: English  Hours: Mon - Fri 8:00 AM - 4:30 PM  Fees: Free   Phone: (227) 500-8070 Email: info@caprw.org Website: http://www.caprw.org/     Soup kitchen or free meals  5  [facility] [place] [facility] Free Summer Meals Distance: 1.3 miles      In-Person   [address] [facility] [place] [address]  Language:  English, Hmong, Nepali  Hours: Mon - Fri 12:00 PM - 1:00 PM , Mon - Fri 3:00 PM - 4:00 PM  Fees: Free   Phone: [phone]) [phone] Email: Kasi@[facility]. Website: https://www.[facility]/departments/patterson-recreation/[facility]     6  [facility] [place] [facility] Distance: 2.06 miles      [address] [address]  Language: English  Hours: Tue 2:00 PM - 4:00 PM , Thu 2:00 PM - 4:00 PM  Fees: Free   Phone: (360) [phone] Email: lopez@[facility]. Website: https://www.[facility]/facilities/ovbxki-awucjitcl-xgampu          Important Numbers & Websites       Emergency Services   911  [facility]   311  Poison Control   (981) 755-8367  Suicide Prevention Lifeline   (367) 872-2737 (TALK)  Child Abuse Hotline   (866) 361-6164 (4-A-Child)  Sexual Assault Hotline   (144) 832-6599 (HOPE)  National Runaway Safeline   (754) 747-1343 (RUNAWAY)  All-Options Talkline   (751) 370-9813  Substance Abuse Referral   ([phone]) [phone] (HELP)

## 2023-10-12 LAB
ALBUMIN SERPL BCG-MCNC: 4.5 G/DL (ref 3.5–5.2)
ALP SERPL-CCNC: 56 U/L (ref 35–104)
ALT SERPL W P-5'-P-CCNC: 9 U/L (ref 0–50)
ANION GAP SERPL CALCULATED.3IONS-SCNC: 10 MMOL/L (ref 7–15)
AST SERPL W P-5'-P-CCNC: 23 U/L (ref 0–45)
BILIRUB SERPL-MCNC: 0.6 MG/DL
BUN SERPL-MCNC: 24.2 MG/DL (ref 8–23)
CALCIUM SERPL-MCNC: 10.1 MG/DL (ref 8.8–10.2)
CHLORIDE SERPL-SCNC: 106 MMOL/L (ref 98–107)
CREAT SERPL-MCNC: 0.94 MG/DL (ref 0.51–0.95)
DEPRECATED HCO3 PLAS-SCNC: 24 MMOL/L (ref 22–29)
EGFRCR SERPLBLD CKD-EPI 2021: 63 ML/MIN/1.73M2
FERRITIN SERPL-MCNC: 180 NG/ML (ref 11–328)
GLUCOSE SERPL-MCNC: 103 MG/DL (ref 70–99)
MAGNESIUM SERPL-MCNC: 2.1 MG/DL (ref 1.7–2.3)
PHOSPHATE SERPL-MCNC: 3.6 MG/DL (ref 2.5–4.5)
POTASSIUM SERPL-SCNC: 4.4 MMOL/L (ref 3.4–5.3)
PROT SERPL-MCNC: 8.2 G/DL (ref 6.4–8.3)
SODIUM SERPL-SCNC: 140 MMOL/L (ref 135–145)
VIT B12 SERPL-MCNC: 299 PG/ML (ref 232–1245)
VIT D+METAB SERPL-MCNC: 31 NG/ML (ref 20–50)

## 2023-10-15 LAB — VIT B1 PYROPHOSHATE BLD-SCNC: 121 NMOL/L

## 2023-11-15 ENCOUNTER — OFFICE VISIT (OUTPATIENT)
Dept: PULMONOLOGY | Facility: CLINIC | Age: 78
End: 2023-11-15
Attending: NURSE PRACTITIONER
Payer: COMMERCIAL

## 2023-11-15 VITALS
BODY MASS INDEX: 18.27 KG/M2 | DIASTOLIC BLOOD PRESSURE: 82 MMHG | OXYGEN SATURATION: 97 % | WEIGHT: 98 LBS | HEART RATE: 82 BPM | SYSTOLIC BLOOD PRESSURE: 138 MMHG

## 2023-11-15 DIAGNOSIS — J30.9 ALLERGIC RHINITIS, UNSPECIFIED SEASONALITY, UNSPECIFIED TRIGGER: ICD-10-CM

## 2023-11-15 DIAGNOSIS — J47.9 BRONCHIECTASIS WITHOUT COMPLICATION (H): ICD-10-CM

## 2023-11-15 DIAGNOSIS — R76.8 ELEVATED IGE LEVEL: ICD-10-CM

## 2023-11-15 DIAGNOSIS — J45.40 MODERATE PERSISTENT ASTHMA WITHOUT COMPLICATION: Primary | ICD-10-CM

## 2023-11-15 PROCEDURE — 99214 OFFICE O/P EST MOD 30 MIN: CPT | Performed by: NURSE PRACTITIONER

## 2023-11-15 RX ORDER — ALBUTEROL SULFATE 90 UG/1
1-2 AEROSOL, METERED RESPIRATORY (INHALATION) EVERY 6 HOURS PRN
Qty: 18 G | Refills: 11 | Status: SHIPPED | OUTPATIENT
Start: 2023-11-15

## 2023-11-15 RX ORDER — BUDESONIDE AND FORMOTEROL FUMARATE DIHYDRATE 160; 4.5 UG/1; UG/1
2 AEROSOL RESPIRATORY (INHALATION) 2 TIMES DAILY
Qty: 18 G | Refills: 11 | Status: SHIPPED | OUTPATIENT
Start: 2023-11-15 | End: 2024-05-15 | Stop reason: ALTCHOICE

## 2023-11-15 RX ORDER — ALBUTEROL SULFATE 0.83 MG/ML
2.5 SOLUTION RESPIRATORY (INHALATION) EVERY 4 HOURS PRN
Qty: 180 ML | Refills: 11 | Status: SHIPPED | OUTPATIENT
Start: 2023-11-15 | End: 2024-05-15 | Stop reason: ALTCHOICE

## 2023-11-15 RX ORDER — MONTELUKAST SODIUM 10 MG/1
1 TABLET ORAL AT BEDTIME
Qty: 30 TABLET | Refills: 11 | Status: SHIPPED | OUTPATIENT
Start: 2023-11-15

## 2023-11-15 ASSESSMENT — ASTHMA QUESTIONNAIRES
QUESTION_5 LAST FOUR WEEKS HOW WOULD YOU RATE YOUR ASTHMA CONTROL: WELL CONTROLLED
QUESTION_1 LAST FOUR WEEKS HOW MUCH OF THE TIME DID YOUR ASTHMA KEEP YOU FROM GETTING AS MUCH DONE AT WORK, SCHOOL OR AT HOME: SOME OF THE TIME
ACT_TOTALSCORE: 16
QUESTION_3 LAST FOUR WEEKS HOW OFTEN DID YOUR ASTHMA SYMPTOMS (WHEEZING, COUGHING, SHORTNESS OF BREATH, CHEST TIGHTNESS OR PAIN) WAKE YOU UP AT NIGHT OR EARLIER THAN USUAL IN THE MORNING: TWO OR THREE NIGHTS A WEEK
ACT_TOTALSCORE: 16
QUESTION_2 LAST FOUR WEEKS HOW OFTEN HAVE YOU HAD SHORTNESS OF BREATH: ONCE OR TWICE A WEEK
QUESTION_4 LAST FOUR WEEKS HOW OFTEN HAVE YOU USED YOUR RESCUE INHALER OR NEBULIZER MEDICATION (SUCH AS ALBUTEROL): TWO OR THREE TIMES PER WEEK

## 2023-11-15 NOTE — PROGRESS NOTES
"    Pulmonary Clinic Follow Up  November 15, 2023      Assessment:  78 year old Taiwanese speaking female with asthma that is not well controlled. PFTs shows decreased FVC and FEV1 with preserved ratio suggestive of restriction.  IgE and midwest allergy panel are abnormal, Singulair started with improvement in symptoms. Recent chest CT scan showed bronchiectasis with bronchial wall thickening and debris. She was hospitalized in 04/2023 with pneumonia. She has been consistent with BID Symbicort, albuterol nebs, and flutter valve use.     Plan:   -Continue Flonase or OTC allergy treatment/non-drowsy antihistamine  -Continue ICS/LABA, Symbicort BID. Rinse and gargle after each use.   -Albuterol nebs twice daily and as needed. Increase to QID if increased symptoms. She was encouraged to do this at least once daily to help keep airways clear.    -Flutter valve following neb treatments   - low threshold for allergy referral for biologics if symptoms flare frequently.   -UTD with flu and pneumonia vaccine    If symptoms exacerbate: prednisone 40mg daily x 5 days    RTC in 6 months, sooner if needed    Freda Mina, [facility]  Pulmonary Medicine  [facility]   [phone]  ---------------------------------    Reason for Visit:   Chief Complaint   Patient presents with    Follow Up       Bronchiectasis without complication (H)  Moderate persistent asthma without complication.          HPI:   Lara returns for follow up, she is being seen with the help of an . She was last seen in clinic on 8/09/2023. Since that time we checked a IgE, this was elevated at 797. Singulair was initiated following this. Most recent CT shows resolution of pneumonia.   She is using Symbicort BID, had not been rinsing her mouth. She is using albuterol occasionally for an \"asthma attack\". She estimates this to be 2-3 times per week.   Denies exacerbation or prednisone use since last visit. Feels her breathing is good.   When her symptoms " "flare she reports coughing, chest tightness, and wheezing.  ACT score if difficult to obtain today given the language barrier while using . Score is 16 but she did not understand some of the questions or time frames.   Denies fevers. She will occasionally cough up a small amount of sputum that is white.      Previous IgE level in 2016 was elevated at 2,572, recheck in 08/2023 was 797.   Occasionally has eye itching and tearing. Unable to identify any triggers for her asthma.         8/30/2023     8:59 AM 10/11/2023     3:08 PM 11/15/2023     8:00 AM   ACT Total Scores   ACT TOTAL SCORE (Goal Greater than or Equal to 20) 9 23 16   In the past 12 months, how many times did you visit the emergency room for your asthma without being admitted to the hospital? 1 0 0   In the past 12 months, how many times were you hospitalized overnight because of your asthma? 0 0 0     ROS:  A 10-system review was negative aside from that mentioned in the HPI.     PMH:  Past Medical History:   Diagnosis Date    Disease of lung 8/19/2014    PFT 2014/in chart Problem list name updated by automated process. Provider to review    Edema of larynx 4/6/2014    Uncomplicated asthma      PSH:  Social History     Tobacco Use    Smoking status: Never    Smokeless tobacco: Never   Vaping Use    Vaping Use: Never used   Substance Use Topics    Alcohol use: No    Drug use: No       Allergies:  Allergies   Allergen Reactions    Pollen Extract      Allergic to pollen from a \"[place] leaf\" tree.    Seasonal Allergies Itching     Family HX:  The patient has no significant family history    Social Hx:  Social History     Socioeconomic History    Marital status:      Spouse name: Not on file    Number of children: Not on file    Years of education: Not on file    Highest education level: Not on file   Occupational History    Not on file   Tobacco Use    Smoking status: Never    Smokeless tobacco: Never   Vaping Use    Vaping Use: Never " used   Substance and Sexual Activity    Alcohol use: No    Drug use: No    Sexual activity: Not Currently   Other Topics Concern    Not on file   Social History Narrative    Not on file     Social Determinants of Health     Financial Resource Strain: Low Risk  (10/11/2023)    Financial Resource Strain     Within the past 12 months, have you or your family members you live with been unable to get utilities (heat, electricity) when it was really needed?: No   Food Insecurity: High Risk (10/11/2023)    Food Insecurity     Within the past 12 months, did you worry that your food would run out before you got money to buy more?: Yes     Within the past 12 months, did the food you bought just not last and you didn t have money to get more?: Yes   Transportation Needs: Low Risk  (10/11/2023)    Transportation Needs     Within the past 12 months, has lack of transportation kept you from medical appointments, getting your medicines, non-medical meetings or appointments, work, or from getting things that you need?: No   Physical Activity: Not on file   Stress: Not on file   Social Connections: Not on file   Interpersonal Safety: Not on file   Housing Stability: Low Risk  (10/11/2023)    Housing Stability     Do you have housing? : Yes     Are you worried about losing your housing?: No       Current Meds:  Current Outpatient Medications   Medication    acetaminophen (TYLENOL) 500 MG tablet    albuterol (PROAIR HFA/PROVENTIL HFA/VENTOLIN HFA) 108 (90 Base) MCG/ACT inhaler    albuterol (PROVENTIL) (2.5 MG/3ML) 0.083% neb solution    alendronate (FOSAMAX) 70 MG tablet    budesonide-formoterol (SYMBICORT) 160-4.5 MCG/ACT Inhaler    diclofenac (VOLTAREN) 1 % topical gel    fluticasone (FLONASE) 50 MCG/ACT nasal spray    meclizine (ANTIVERT) 25 MG tablet    montelukast (SINGULAIR) 10 MG tablet    naproxen (NAPROSYN) 375 MG tablet    olopatadine (PATADAY) 0.2 % ophthalmic solution    omega 3 1000 MG CAPS     No current  facility-administered medications for this visit.     Physical Exam:  /82 (BP Location: Left arm, Patient Position: Sitting, Cuff Size: Adult Regular)   Pulse 82   Wt 44.5 kg (98 lb)   SpO2 97%   BMI 18.27 kg/m      Physical Exam  Constitutional:       General: She is not in acute distress.     Appearance: She is not ill-appearing or diaphoretic.   HENT:      Nose: Nose normal.   Cardiovascular:      Rate and Rhythm: Normal rate and regular rhythm.      Pulses: Normal pulses.      Heart sounds: Normal heart sounds.   Pulmonary:      Effort: Pulmonary effort is normal. No respiratory distress.      Breath sounds: Examination of the right-lower field reveals decreased breath sounds. Examination of the left-lower field reveals decreased breath sounds. Decreased breath sounds present. No wheezing or rhonchi.   Musculoskeletal:      Right lower leg: No edema.      Left lower leg: No edema.   Skin:     General: Skin is warm and dry.      Findings: No rash.   Neurological:      Mental Status: She is alert.   Psychiatric:         Behavior: Behavior normal.       Labs:  Recent Labs   Lab Test 12/13/18  1003   WBC 8.1   RBC 4.99   HGB 14.2   HCT 45.3   MCV 91   MCH 28.5   MCHC 31.3*   RDW 13.0        IgE 6/2016  Component Ref Range & Units 6 yr ago      Immunoglobulin E 0.00 - 100.00 kU/L 2,572.00 High        Latest Reference Range & Units 08/30/23 09:17   Allergen A alternata <0.10 KU(A)/L <0.10   Allergen A fumigatus <0.10 KU(A)/L <0.10   Allergen, Bermuda Grass <0.10 KU(A)/L 9.27 (H)   Allergen C herbarum <0.10 KU(A)/L <0.10   Allergen Cat Dander <0.10 KU(A)/L <0.10   Allergen Cockroach <0.10 KU(A)/L 49.10 (H)   Allergen D farinae <0.10 KU(A)/L 0.93 (H)   Allergen, D Pteronyssinus <0.10 KU(A)/L 1.19 (H)   Allergen Dog Dander <0.10 KU(A)/L <0.10   Allergen Elm <0.10 KU(A)/L 7.07 (H)   Allergen Maple <0.10 KU(A)/L 6.34 (H)   Allergen, Mtn Cedar <0.10 KU(A)/L 3.95 (H)   Allergen Oak(white) <0.10 KU(A)/L 9.80  (H)   Allergen P notatum <0.10 KU(A)/L <0.10   Allergen Caleb <0.10 KU(A)/L 11.70 (H)   Allergen Tree White Linn IgE <0.10 KU(A)/L 1.63 (H)   Allergen Weed Nettle IgE <0.10 KU(A)/L 5.80 (H)   Allergen [place] <0.10 KU(A)/L 6.95 (H)   Allergen Marshelder <0.10 KU(A)/L 8.59 (H)   Allergen, Mouse Urine <0.10 KU(A)/L 7.38 (H)   Allergen, Ragweed Short <0.10 KU(A)/L 7.04 (H)   Allergen Russian Thistle <0.10 KU(A)/L 10.70 (H)   Allergen, Silver Birch <0.10 KU(A)/L 1.64 (H)   Allergen White Tyler <0.10 KU(A)/L 9.91 (H)   IGE 0 - 114 kU/L  0 - 114 kU/L 728 (H)  797 (H)     Imaging studies:  Personally reviewed image/s and Personally reviewed impression/s    XR CHEST 2 VIEWS, 12/13/2018 10:31 AM  INDICATION: Dyspnea.  COMPARISON: 03/01/2017 CT chest and 09/16/2016 chest radiograph.  FINDINGS: Shallow inspiration exaggerates heart size and pulmonary vascularity. Opacities in the right middle lobe, lingula have not changed significantly. There may be a trace amount of interstitial edema in the lower lungs. No pleural effusion. Bones   appear demineralized.    CT CHEST HI-RESOLUTION WO CONTRAST, DATE/TIME: 10/16/2022 11:45 AM  INDICATION:  Restrictive lung disease  COMPARISON: No prior cross-sectional imaging of the chest; frontal and lateral radiographs of the chest 12/13/2018  FINDINGS:   LUNGS AND PLEURA: There is a focal opacities within the anterior basal segment of the right lower lobe, and to a lesser extent in the right middle lobe and lingula associated with conspicuous volume loss, bronchiectasis, and subsegmental airway debris.   The anterior right lower lobe lung opacity contains a few small calcifications. In the adjacent lateral basal segment of the right lower lobe there are additional foci of visible peripheral airway material as well as branching centrilobular   nodules/tree-in-bud opacity. Sparse peripheral airway debris noted within the left lower lobe and superior lingular segment. Air trapping is not  a conspicuous feature on the expiratory images. No pleural effusion or pleural thickening.  MEDIASTINUM: Cardiac chambers are normal in size. No pericardial effusion. Mild calcification of the aortic root but no root enlargement. Preserved sinotubular junction. Normal caliber thoracic aorta. Conventional arch anatomy. There are granulomatous   calcifications present in right perihilar, subcarinal and right paratracheal lymph nodes, typical distribution for remote prior granulomatous infection. No lymphadenopathy. Decompressed esophagus. No actionable thyroid nodules.  CORONARY ARTERY CALCIFICATION: Mild.  IMPRESSION:   1.  Subsegmental airway abnormalities are present in the basal right lower lobe, lingula, and right middle lobe including bronchiectasis, bronchial wall thickening and endoluminal airway material. Focal cicatrization in the anterior basal right lower lobe and subsegmental cicatrization in the right middle lobe and lingula. 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  2.  No findings to suggest a diffuse fibrosing lung disorder or obstructive small airways process.    PFT's   Personally reviewed with patient.   FEV1/FVC is 78 and is normal.  FEV1 is 1.17 L (62% predicted) and is reduced.  FVC is 1.50 L (62% predicted) and reduced.  Unfortunately DLCO and lung volumes could not be performed.  Impression: normal ratio but reduced FEV1 and FVC suggests restriction.

## 2023-11-15 NOTE — PATIENT INSTRUCTIONS
- try and use the nebulizer treatments at least 1-2 times daily to help clear the mucous from your airways.   - continue Symbicort inhaler 2 times per day as you have been  - continue albuterol every 6 hours as needed for flares  - continue Singulair pill every night at bedtime     If you have worsening symptoms, questions, or need to speak with the nurse please call [phone].

## 2023-12-13 ENCOUNTER — OFFICE VISIT (OUTPATIENT)
Dept: FAMILY MEDICINE | Facility: CLINIC | Age: 78
End: 2023-12-13
Payer: COMMERCIAL

## 2023-12-13 VITALS
HEART RATE: 82 BPM | TEMPERATURE: 98.3 F | OXYGEN SATURATION: 98 % | RESPIRATION RATE: 18 BRPM | SYSTOLIC BLOOD PRESSURE: 119 MMHG | BODY MASS INDEX: 19.06 KG/M2 | HEIGHT: 62 IN | DIASTOLIC BLOOD PRESSURE: 79 MMHG | WEIGHT: 103.6 LBS

## 2023-12-13 DIAGNOSIS — Z23 ENCOUNTER FOR ADMINISTRATION OF COVID-19 VACCINE: ICD-10-CM

## 2023-12-13 DIAGNOSIS — G89.29 CHRONIC PAIN OF RIGHT HIP: Primary | ICD-10-CM

## 2023-12-13 DIAGNOSIS — M25.551 CHRONIC PAIN OF RIGHT HIP: Primary | ICD-10-CM

## 2023-12-13 PROCEDURE — 91320 SARSCV2 VAC 30MCG TRS-SUC IM: CPT

## 2023-12-13 PROCEDURE — 90480 ADMN SARSCOV2 VAC 1/ONLY CMP: CPT

## 2023-12-13 PROCEDURE — 99213 OFFICE O/P EST LOW 20 MIN: CPT | Mod: 25

## 2023-12-13 RX ORDER — RESPIRATORY SYNCYTIAL VIRUS VACCINE 120MCG/0.5
0.5 KIT INTRAMUSCULAR ONCE
Qty: 1 EACH | Refills: 0 | Status: CANCELLED | OUTPATIENT
Start: 2023-12-13 | End: 2023-12-13

## 2023-12-13 NOTE — PATIENT INSTRUCTIONS
Thank you for choosing [facility] for your care. Today we addressed:     - Chronic hip pain. You can take 2 tablets of Tylenol every 6-8 hours.  - You can take Naproxen 2 times daily, once in the morning and once in the evening.    If you have any questions or need further assistance, please call the clinic at [phone].    Saleem Anglin, DO PGY-1  [facility] Family Medicine Residency

## 2023-12-13 NOTE — PROGRESS NOTES
"  Assessment & Plan     Chronic pain of right hip  Patient here for evaluation of chronic right hip pain.  States that it has been worsening over the last 4 to 5 months.  Was last seen in August for this problem, hip x-ray demonstrated no intra-articular bony process indicating osteoporosis, grossly normal.  Patient denies any radiation of pain into her leg.  - naproxen (NAPROSYN) 375 MG tablet  Dispense: 30 tablet; Refill: 1  - Recommended PT, however patient states that she has much difficulty getting out of the house.  Discussed the possibility of assistance, patient stated she is not interested at this time.    Encounter for administration of COVID-19 vaccine  - COVID-19 12+ (2023-24) (PFIZER)      Return if symptoms worsen or fail to improve.    Saleem Anglin, [facility] OTTO Bermudez is a 78 year old, presenting for the following health issues:  Trauma (Painful, very hard to walking and aching. )      HPI   Patient is a 78-year-old female presenting to clinic for evaluation of right lower hip pain, chronic.  States that over the last 4 to 5 months it has been worsening, she has been taking 1 tablet of Tylenol every morning to manage her symptoms.  Patient states that this has been going on for over a year, states that the Tylenol that she has been taking his not managed her pain very well.    Patient denies any recent illness, fever, chills, shortness of breath, chest pain, abdominal pain.      Review of Systems   Constitutional, HEENT, cardiovascular, pulmonary, gi and gu systems are negative, except as otherwise noted.      Objective    /79   Pulse 82   Temp 98.3  F (36.8  C) (Oral)   Resp 18   Ht 1.58 m (5' 2.21\")   Wt 47 kg (103 lb 9.6 oz)   SpO2 98%   BMI 18.82 kg/m    Body mass index is 18.82 kg/m .  Physical Exam   Constitutional: awake, alert, cooperative, no apparent distress  Eyes: Lids and lashes normal, pupils equal, sclera clear, conjunctiva " normal  ENT: Normocephalic, without obvious abnormality, atraumatic, external ears without lesions, oral pharynx with moist mucous membranes  Respiratory: No increased work of breathing, good air exchange, clear to auscultation bilaterally, no crackles or wheezing  Cardiovascular: Regular rate and rhythm, no murmur noted  Skin: no bruising or bleeding and normal skin color, texture, turgor on exposed skin  Psych: Appropriate mood and affect.  MSK: Patient ambulates with a shuffling gait without her cane, right-sided hip flexion elicits pain in posterior right gluteal region.  Range of motion does not appear to be restricted.      ----- Service Performed and Documented by Resident or Fellow ------          Saleem Anglin DO PGY-1  [facility] Family Medicine Residency  12/13/23    Precepted today with Dr. Chet Gibbs.

## 2024-01-02 DIAGNOSIS — J30.2 SEASONAL ALLERGIC RHINITIS, UNSPECIFIED TRIGGER: ICD-10-CM

## 2024-01-02 DIAGNOSIS — M79.661 PAIN OF RIGHT LOWER LEG: ICD-10-CM

## 2024-01-03 RX ORDER — PSEUDOEPHED/ACETAMINOPH/DIPHEN 30MG-500MG
TABLET ORAL
Qty: 120 TABLET | Refills: 1 | Status: SHIPPED | OUTPATIENT
Start: 2024-01-03 | End: 2024-07-29

## 2024-01-03 RX ORDER — FLUTICASONE PROPIONATE 50 MCG
1-2 SPRAY, SUSPENSION (ML) NASAL DAILY
Qty: 48 ML | Refills: 0 | Status: SHIPPED | OUTPATIENT
Start: 2024-01-03 | End: 2024-03-26

## 2024-01-03 NOTE — TELEPHONE ENCOUNTER
Prescription approved per [facility] Refill Protocol.    Medication requested:  FLUTICASONE PROP 50 MCG SPRAY   Last office visit: 12/13/23  Future [facility] appointments: none  Medication last refilled: 9/12/2022   Last qualifying labs:     Nasal Allergy Protocol Pisatf5801/02/2024 12:23 PM   Protocol Details Patient is age 12 or older    Recent (12 mo) or future (30 days) visit within the authorizing provider's specialty    Medication is active on med list        DIO Vogt    Routing refill request to provider for review/approval because:   Medication matches indication     Medication requested: ACETAMINOPHEN 500 MG TABLET   Last office visit: 12/13/23  Future [facility] appointments: none  Medication last refilled:  7/13/2022   Last qualifying labs: none    Routed to provider due to failed RN protocol.     DIO Vogt

## 2024-03-10 DIAGNOSIS — G89.29 CHRONIC PAIN OF RIGHT HIP: ICD-10-CM

## 2024-03-10 DIAGNOSIS — M25.551 CHRONIC PAIN OF RIGHT HIP: ICD-10-CM

## 2024-03-11 NOTE — TELEPHONE ENCOUNTER
Routing refill request to provider for review/approval because:    NSAID Medications Jmyvct02/10/2024 11:36 AM   Protocol Details Patient is age 6-64 years    Normal CBC on file in past 12 months    Always Fail Criteria - Chart Review Required    Recent (12 mo) or future (90 days) visit within the authorizing provider's specialty        Medication requested: NAPROXEN 375 MG TABLET   Last office visit: 12/13/2023  Future [facility] appointments: none  Medication last refilled: 2/15/2024   Last qualifying labs:   Component      Latest Ref Rng 10/11/2023  3:33 PM   WBC      4.0 - 11.0 10e3/uL 7.5    RBC Count      3.80 - 5.20 10e6/uL 4.07    Hemoglobin      11.7 - 15.7 g/dL 11.6 (L)    Hematocrit      35.0 - 47.0 % 36.6    MCV      78 - 100 fL 90    MCH      26.5 - 33.0 pg 28.5    MCHC      31.5 - 36.5 g/dL 31.7    RDW      10.0 - 15.0 % 14.0    Platelet Count      150 - 450 10e3/uL 215       Legend:  (L) Osei Rosa RN

## 2024-03-26 DIAGNOSIS — J30.2 SEASONAL ALLERGIC RHINITIS, UNSPECIFIED TRIGGER: ICD-10-CM

## 2024-03-26 RX ORDER — FLUTICASONE PROPIONATE 50 MCG
1-2 SPRAY, SUSPENSION (ML) NASAL DAILY
Qty: 48 ML | Refills: 0 | Status: SHIPPED | OUTPATIENT
Start: 2024-03-26

## 2024-03-26 NOTE — TELEPHONE ENCOUNTER
Medication requested: FLUTICASONE PROP 50 MCG SPRAY   Last office visit: 12/13/24  Future [facility] appointments: none  Medication last refilled: 1/3/24  Last qualifying labs: none    Prescription approved per [facility] Refill Protocol.  Nasal Allergy Protocol [id number]/26/2024 08:31 AM   Protocol Details Patient is age 12 or older    Recent (12 mo) or future (30 days) visit within the authorizing provider's specialty    Medication is active on med list     DIO Wilson, BSN

## 2024-05-02 ENCOUNTER — MEDICAL CORRESPONDENCE (OUTPATIENT)
Dept: HEALTH INFORMATION MANAGEMENT | Facility: CLINIC | Age: 79
End: 2024-05-02
Payer: COMMERCIAL

## 2024-05-02 ENCOUNTER — DOCUMENTATION ONLY (OUTPATIENT)
Dept: FAMILY MEDICINE | Facility: CLINIC | Age: 79
End: 2024-05-02
Payer: COMMERCIAL

## 2024-05-02 NOTE — PROGRESS NOTES
To be completed in Nursing note:    Please reference list for forms that require a visit for completion.  Please remind patients that providers are given 3-5 business days to complete and return forms.      Form type:Activstyle: Prescription / Certificate of Medical Necessity    Date form received: 24    Date form completed by Physician:24    How was form returned to patient (mailed, faxed, or at  for patient to ):  Faxed to [phone]  Date form mailed/faxed/left at  for patient and sent to HIM for [email or web address]    Once form is left for patient, faxed, or mailed PCS will then close the documentation only encounter.

## 2024-05-15 ENCOUNTER — OFFICE VISIT (OUTPATIENT)
Dept: PULMONOLOGY | Facility: CLINIC | Age: 79
End: 2024-05-15
Attending: NURSE PRACTITIONER
Payer: COMMERCIAL

## 2024-05-15 VITALS
WEIGHT: 107 LBS | BODY MASS INDEX: 19.44 KG/M2 | HEART RATE: 81 BPM | OXYGEN SATURATION: 95 % | SYSTOLIC BLOOD PRESSURE: 136 MMHG | DIASTOLIC BLOOD PRESSURE: 81 MMHG

## 2024-05-15 DIAGNOSIS — J98.4 RESTRICTIVE LUNG DISEASE: ICD-10-CM

## 2024-05-15 DIAGNOSIS — J45.40 MODERATE PERSISTENT ASTHMA WITHOUT COMPLICATION: Primary | ICD-10-CM

## 2024-05-15 DIAGNOSIS — R76.8 ELEVATED IGE LEVEL: ICD-10-CM

## 2024-05-15 DIAGNOSIS — J30.9 ALLERGIC RHINITIS, UNSPECIFIED SEASONALITY, UNSPECIFIED TRIGGER: ICD-10-CM

## 2024-05-15 PROCEDURE — 99214 OFFICE O/P EST MOD 30 MIN: CPT | Performed by: NURSE PRACTITIONER

## 2024-05-15 PROCEDURE — T1013 SIGN LANG/ORAL INTERPRETER: HCPCS | Mod: GT | Performed by: NURSE PRACTITIONER

## 2024-05-15 RX ORDER — BUDESONIDE 0.5 MG/2ML
0.5 INHALANT ORAL 2 TIMES DAILY
Qty: 120 ML | Refills: 11 | Status: SHIPPED | OUTPATIENT
Start: 2024-05-15

## 2024-05-15 RX ORDER — IPRATROPIUM BROMIDE AND ALBUTEROL SULFATE 2.5; .5 MG/3ML; MG/3ML
1 SOLUTION RESPIRATORY (INHALATION) 2 TIMES DAILY
Qty: 180 ML | Refills: 11 | Status: SHIPPED | OUTPATIENT
Start: 2024-05-15

## 2024-05-15 ASSESSMENT — ASTHMA QUESTIONNAIRES
QUESTION_4 LAST FOUR WEEKS HOW OFTEN HAVE YOU USED YOUR RESCUE INHALER OR NEBULIZER MEDICATION (SUCH AS ALBUTEROL): ONE OR TWO TIMES PER DAY
QUESTION_2 LAST FOUR WEEKS HOW OFTEN HAVE YOU HAD SHORTNESS OF BREATH: MORE THAN ONCE A DAY
QUESTION_1 LAST FOUR WEEKS HOW MUCH OF THE TIME DID YOUR ASTHMA KEEP YOU FROM GETTING AS MUCH DONE AT WORK, SCHOOL OR AT HOME: MOST OF THE TIME
ACT_TOTALSCORE: 8
QUESTION_3 LAST FOUR WEEKS HOW OFTEN DID YOUR ASTHMA SYMPTOMS (WHEEZING, COUGHING, SHORTNESS OF BREATH, CHEST TIGHTNESS OR PAIN) WAKE YOU UP AT NIGHT OR EARLIER THAN USUAL IN THE MORNING: TWO OR THREE NIGHTS A WEEK
QUESTION_5 LAST FOUR WEEKS HOW WOULD YOU RATE YOUR ASTHMA CONTROL: NOT CONTROLLED AT ALL
ACT_TOTALSCORE: 8

## 2024-05-15 NOTE — PROGRESS NOTES
Pulmonary Clinic Follow Up  May 15, 2024      Assessment:  79 year old Yi speaking female with asthma that is not well controlled. PFTs shows decreased FVC and FEV1 with preserved ratio suggestive of restriction.  IgE and [place] allergy panel are abnormal, Singulair started with improvement in symptoms. She has been consistent with BID Symbicort, albuterol nebs, and flutter valve use.  Reports worsening shortness of breath and cough over the last month.  Likely secondary to her allergies.  We assessed her inhaler technique today with the In-check device.  She was unable to achieve the suggested inspiratory flow for Symbicort so we will initiate nebulized only therapy.  She continues to feel her albuterol HFA is helpful so we will not discontinue this.  Chest CT scan showed bronchiectasis with bronchial wall thickening and debris. She was hospitalized in 04/2023 with pneumonia.   ACT has worsened since last visit, 8 today.     Plan:   -Continue Flonase or OTC allergy treatment/non-drowsy antihistamine  -Stop Symbicort   -START budesonide nebulized twice daily.  Rinse and gargle after each use.   -Neither LABA nebulized formula is covered by her insurance so we will initiate DuoNebs twice daily. Increase to QID if increased symptoms. She was encouraged to do this at least once daily to help keep airways clear.    -We went over inhaled medication regimen in great detail.  We will have close follow-up to ensure she is using medications correctly.  Contact us  -Flutter valve following neb treatments   - low threshold for allergy referral for biologics if symptoms flare frequently.   -UTD with flu and pneumonia vaccine    If symptoms exacerbate: prednisone 40mg daily x 5 days    RTC in 1 month for recheck    Freda Mina CNP  Pulmonary Medicine  [facility]   [phone]  ---------------------------------    Reason for Visit:   Chief Complaint   Patient presents with    Follow Up     Asthma        HPI:  "  Lara returns for follow up, she is being seen with the help of an . She was last seen in clinic in 11/2023.  Since that time she was doing well on Symbicort, Singulair, and albuterol as needed.  Over the past month since the weather has warmed she has had increased cough, wheeze, and shortness of breath.  She is also having irritated, watery eyes.  She reports using her Symbicort twice daily no matter what and has been needing albuterol multiple times a day.  She feels the albuterol is helpful.    Previous IgE level in 2016 was elevated at 2,572, recheck in 08/2023 was 797. Singulair was initiated following this.   Occasionally has eye itching and tearing. Unable to identify any triggers for her asthma.         10/11/2023     3:08 PM 11/15/2023     8:00 AM 5/15/2024     7:52 AM   ACT Total Scores   ACT TOTAL SCORE (Goal Greater than or Equal to 20) 23 16 8   In the past 12 months, how many times did you visit the emergency room for your asthma without being admitted to the hospital? 0 0 0   In the past 12 months, how many times were you hospitalized overnight because of your asthma? 0 0 0     ROS:  A 10-system review was negative aside from that mentioned in the HPI.     PMH:  Past Medical History:   Diagnosis Date    Disease of lung 8/19/2014    PFT 2014/in chart Problem list name updated by automated process. Provider to review    Edema of larynx 4/6/2014    Uncomplicated asthma      PSH:  Social History     Tobacco Use    Smoking status: Never     Passive exposure: Never    Smokeless tobacco: Never   Vaping Use    Vaping status: Never Used   Substance Use Topics    Alcohol use: No    Drug use: No     Allergies:  Allergies   Allergen Reactions    Pollen Extract      Allergic to pollen from a \"[place] leaf\" tree.    Seasonal Allergies Itching     Family HX:  The patient has no significant family history    Social Hx:  Social History     Tobacco Use    Smoking status: Never     Passive exposure: Never "    Smokeless tobacco: Never   Vaping Use    Vaping status: Never Used   Substance Use Topics    Alcohol use: No    Drug use: No     Current Meds:  Current Outpatient Medications   Medication Sig Dispense Refill    albuterol (PROAIR HFA/PROVENTIL HFA/VENTOLIN HFA) 108 (90 Base) MCG/ACT inhaler Inhale 1-2 puffs into the lungs every 6 hours as needed for shortness of breath, wheezing or cough 18 g 11    albuterol (PROVENTIL) (2.5 MG/3ML) 0.083% neb solution Take 1 vial (2.5 mg) by nebulization every 4 hours as needed for shortness of breath, wheezing or cough 180 mL 11    budesonide-formoterol (SYMBICORT) 160-4.5 MCG/ACT Inhaler Inhale 2 puffs into the lungs 2 times daily 18 g 11    acetaminophen (TYLENOL) 500 MG tablet TAKE 1 TABLET (500 MG) BY MOUTH EVERY 8 HOURS AS NEEDED FOR MILD PAIN 120 tablet 1    alendronate (FOSAMAX) 70 MG tablet TAKE 1 TAB EVERY 7 DAYS 60 MINUTES BEFORE MORNING MEAL WITH 8OZ WATER & REMAIN UPRIGHT FOR 30 MIN. 12 tablet 3    diclofenac (VOLTAREN) 1 % topical gel Apply 4 g topically 4 times daily 350 g 3    fluticasone (FLONASE) 50 MCG/ACT nasal spray SHAKE LIQUID AND USE 1 TO 2 SPRAYS IN EACH NOSTRIL DAILY 48 mL 0    meclizine (ANTIVERT) 25 MG tablet TAKE 1 TABLET BY MOUTH TWICE DAILY AS NEEDED FOR DIZZINESS 30 tablet 1    montelukast (SINGULAIR) 10 MG tablet Take 1 tablet (10 mg) by mouth at bedtime 30 tablet 11    naproxen (NAPROSYN) 375 MG tablet TAKE 1 TABLET BY MOUTH TWICE A DAY WITH MEALS 30 tablet 1    olopatadine (PATADAY) 0.2 % ophthalmic solution Place 0.05 mLs (1 drop) into both eyes daily 2.5 mL 3    omega 3 1000 MG CAPS Take 1,000 mg by mouth Once a day       No current facility-administered medications for this visit.     Physical Exam:  BP (!) 149/95 (BP Location: Left arm, Patient Position: Chair, Cuff Size: Adult Small)   Pulse 81   Wt 48.5 kg (107 lb)   SpO2 95%   BMI 19.44 kg/m      Physical Exam  Constitutional:       General: She is not in acute distress.      Appearance: She is not ill-appearing or diaphoretic.   HENT:      Nose: Nose normal.   Cardiovascular:      Rate and Rhythm: Normal rate and regular rhythm.      Pulses: Normal pulses.      Heart sounds: Normal heart sounds.   Pulmonary:      Effort: Pulmonary effort is normal. No respiratory distress.      Breath sounds: Examination of the right-lower field reveals decreased breath sounds. Examination of the left-lower field reveals decreased breath sounds. Decreased breath sounds present. No wheezing or rhonchi.   Musculoskeletal:      Right lower leg: No edema.      Left lower leg: No edema.   Skin:     General: Skin is warm and dry.      Findings: No rash.   Neurological:      Mental Status: She is alert.   Psychiatric:         Behavior: Behavior normal.       Labs:  Recent Labs   Lab Test 12/13/18  1003   WBC 8.1   RBC 4.99   HGB 14.2   HCT 45.3   MCV 91   MCH 28.5   MCHC 31.3*   RDW 13.0        IgE 6/2016  Component Ref Range & Units 6 yr ago      Immunoglobulin E 0.00 - 100.00 kU/L 2,572.00 High        Latest Reference Range & Units 08/30/23 09:17   Allergen A alternata <0.10 KU(A)/L <0.10   Allergen A fumigatus <0.10 KU(A)/L <0.10   Allergen, B[place] Grass <0.10 KU(A)/L 9.27 (H)   Allergen C herbarum <0.10 KU(A)/L <0.10   Allergen Cat Dander <0.10 KU(A)/L <0.10   Allergen Cockroach <0.10 KU(A)/L 49.10 (H)   Allergen D farinae <0.10 KU(A)/L 0.93 (H)   Allergen, D Pteronyssinus <0.10 KU(A)/L 1.19 (H)   Allergen Dog Dander <0.10 KU(A)/L <0.10   Allergen Elm <0.10 KU(A)/L 7.07 (H)   Allergen Maple <0.10 KU(A)/L 6.34 (H)   Allergen, Mtn Cedar <0.10 KU(A)/L 3.95 (H)   Allergen Oak(white) <0.10 KU(A)/L 9.80 (H)   Allergen P notatum <0.10 KU(A)/L <0.10   Allergen Caleb <0.10 KU(A)/L 11.70 (H)   Allergen Tree White [place] IgE <0.10 KU(A)/L 1.63 (H)   Allergen Weed Nettle IgE <0.10 KU(A)/L 5.80 (H)   Allergen [place] <0.10 KU(A)/L 6.95 (H)   Allergen Marshelder <0.10 KU(A)/L 8.59 (H)   Allergen, Mouse  Urine <0.10 KU(A)/L 7.38 (H)   Allergen, Ragweed Short <0.10 KU(A)/L 7.04 (H)   Allergen Russian Thistle <0.10 KU(A)/L 10.70 (H)   Allergen, Silver Birch <0.10 KU(A)/L 1.64 (H)   Allergen White [place] <0.10 KU(A)/L 9.91 (H)   IGE 0 - 114 kU/L  0 - 114 kU/L 728 (H)  797 (H)     Imaging studies:  Personally reviewed image/s and Personally reviewed impression/s    XR CHEST 2 VIEWS, 12/13/2018 10:31 AM  INDICATION: Dyspnea.  COMPARISON: 03/01/2017 CT chest and 09/16/2016 chest radiograph.  FINDINGS: Shallow inspiration exaggerates heart size and pulmonary vascularity. Opacities in the right middle lobe, lingula have not changed significantly. There may be a trace amount of interstitial edema in the lower lungs. No pleural effusion. Bones   appear demineralized.    CT CHEST HI-RESOLUTION WO CONTRAST, DATE/TIME: 10/16/2022 11:45 AM  INDICATION:  Restrictive lung disease  COMPARISON: No prior cross-sectional imaging of the chest; frontal and lateral radiographs of the chest 12/13/2018  FINDINGS:   LUNGS AND PLEURA: There is a focal opacities within the anterior basal segment of the right lower lobe, and to a lesser extent in the right middle lobe and lingula associated with conspicuous volume loss, bronchiectasis, and subsegmental airway debris.   The anterior right lower lobe lung opacity contains a few small calcifications. In the adjacent lateral basal segment of the right lower lobe there are additional foci of visible peripheral airway material as well as branching centrilobular   nodules/tree-in-bud opacity. Sparse peripheral airway debris noted within the left lower lobe and superior lingular segment. Air trapping is not a conspicuous feature on the expiratory images. No pleural effusion or pleural thickening.  MEDIASTINUM: Cardiac chambers are normal in size. No pericardial effusion. Mild calcification of the aortic root but no root enlargement. Preserved sinotubular junction. Normal caliber thoracic aorta.  Conventional arch anatomy. There are granulomatous   calcifications present in right perihilar, subcarinal and right paratracheal lymph nodes, typical distribution for remote prior granulomatous infection. No lymphadenopathy. Decompressed esophagus. No actionable thyroid nodules.  CORONARY ARTERY CALCIFICATION: Mild.  IMPRESSION:   1.  Subsegmental airway abnormalities are present in the basal right lower lobe, lingula, and right middle lobe including bronchiectasis, bronchial wall thickening and endoluminal airway material. Focal cicatrization in the anterior basal right lower lobe and subsegmental cicatrization in the right middle lobe and lingula. 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  2.  No findings to suggest a diffuse fibrosing lung disorder or obstructive small airways process.    PFT's    2022  FEV1/FVC is 78 and is normal.  FEV1 is 1.17 L (62% predicted) and is reduced.  FVC is 1.50 L (62% predicted) and reduced.  Unfortunately DLCO and lung volumes could not be performed.  Impression: normal ratio but reduced FEV1 and FVC suggests restriction.

## 2024-05-15 NOTE — PATIENT INSTRUCTIONS
It was a pleasure seeing you in clinic today. This is what we discussed:    We will STOP the Symbicort inhaler since you are not able to inhale strong enough to use it.   START the budesonide and Duonebs (albuterol/ipratropium) twice daily no matter what.   Use your albuterol every 4 hours as needed for cough, shortness of breath, wheeze, or chest tightness.   Make sure you are taking the Singulair pill every day no matter what. This will help with your allergies AND asthma.   Follow up 1 month   If you have worsening symptoms, questions, or need to speak with the nurse please call [phone].

## 2024-05-23 ENCOUNTER — TELEPHONE (OUTPATIENT)
Dept: PHARMACY | Facility: OTHER | Age: 79
End: 2024-05-23
Payer: COMMERCIAL

## 2024-05-23 NOTE — TELEPHONE ENCOUNTER
[facility] Recruitment: [facility]     Referral outreach attempt #1 on May 23, 2024      Outcome: left voicemail- Call back number [phone]. Called with Faroese  ID #[id number].    Tari BillingsleyD  Medication Therapy Management Pharmacist  [facility]  Office phone: [phone]

## 2024-05-29 ENCOUNTER — TELEPHONE (OUTPATIENT)
Dept: FAMILY MEDICINE | Facility: CLINIC | Age: 79
End: 2024-05-29
Payer: COMMERCIAL

## 2024-05-29 NOTE — TELEPHONE ENCOUNTER
Attempted to assist patient with scheduling DEXA scan, left vm with return number and number to scheduling line via .     LON Ace

## 2024-06-17 ENCOUNTER — TELEPHONE (OUTPATIENT)
Dept: PHARMACY | Facility: OTHER | Age: 79
End: 2024-06-17
Payer: COMMERCIAL

## 2024-06-17 NOTE — TELEPHONE ENCOUNTER
[facility] Recruitment outreach attempt #3 on June 17, 2024  [facility] Recruitment: [employer] insurance     Outcome: left voicemail. Left callback number for [facility] [phone]. [facility] [place]  ID # [id number] assisted in leaving the message      Jacqueline Campbell, Pharm.D.  [facility] Pharmacist  [facility]

## 2024-06-26 NOTE — TELEPHONE ENCOUNTER
MTM Recruitment: [facility]     Referral outreach attempt #4 on June 26, 2024      Outcome: left voicemail- Call back number [phone] using Salvadorean .    Diamond Winston, PharmD, BCACP  Medication Therapy Management Pharmacist

## 2024-06-27 ENCOUNTER — TELEPHONE (OUTPATIENT)
Dept: PHARMACY | Facility: OTHER | Age: 79
End: 2024-06-27
Payer: COMMERCIAL

## 2024-07-17 ENCOUNTER — OFFICE VISIT (OUTPATIENT)
Dept: PULMONOLOGY | Facility: CLINIC | Age: 79
End: 2024-07-17
Attending: NURSE PRACTITIONER
Payer: COMMERCIAL

## 2024-07-17 VITALS — SYSTOLIC BLOOD PRESSURE: 134 MMHG | OXYGEN SATURATION: 99 % | DIASTOLIC BLOOD PRESSURE: 78 MMHG | HEART RATE: 82 BPM

## 2024-07-17 DIAGNOSIS — J45.40 MODERATE PERSISTENT ASTHMA WITHOUT COMPLICATION: Primary | ICD-10-CM

## 2024-07-17 DIAGNOSIS — J98.4 RESTRICTIVE LUNG DISEASE: ICD-10-CM

## 2024-07-17 DIAGNOSIS — R76.8 ELEVATED IGE LEVEL: ICD-10-CM

## 2024-07-17 DIAGNOSIS — J30.9 ALLERGIC RHINITIS, UNSPECIFIED SEASONALITY, UNSPECIFIED TRIGGER: ICD-10-CM

## 2024-07-17 DIAGNOSIS — J47.9 BRONCHIECTASIS WITHOUT COMPLICATION (H): ICD-10-CM

## 2024-07-17 PROCEDURE — G2211 COMPLEX E/M VISIT ADD ON: HCPCS | Performed by: NURSE PRACTITIONER

## 2024-07-17 PROCEDURE — 99214 OFFICE O/P EST MOD 30 MIN: CPT | Performed by: NURSE PRACTITIONER

## 2024-07-17 RX ORDER — BUDESONIDE AND FORMOTEROL FUMARATE DIHYDRATE 160; 4.5 UG/1; UG/1
2 AEROSOL RESPIRATORY (INHALATION) 2 TIMES DAILY
Qty: 10.2 G | Refills: 11 | Status: SHIPPED | OUTPATIENT
Start: 2024-07-17

## 2024-07-17 ASSESSMENT — ASTHMA QUESTIONNAIRES
QUESTION_1 LAST FOUR WEEKS HOW MUCH OF THE TIME DID YOUR ASTHMA KEEP YOU FROM GETTING AS MUCH DONE AT WORK, SCHOOL OR AT HOME: SOME OF THE TIME
ACT_TOTALSCORE: 16
QUESTION_2 LAST FOUR WEEKS HOW OFTEN HAVE YOU HAD SHORTNESS OF BREATH: THREE TO SIX TIMES A WEEK
ACT_TOTALSCORE: 16
QUESTION_3 LAST FOUR WEEKS HOW OFTEN DID YOUR ASTHMA SYMPTOMS (WHEEZING, COUGHING, SHORTNESS OF BREATH, CHEST TIGHTNESS OR PAIN) WAKE YOU UP AT NIGHT OR EARLIER THAN USUAL IN THE MORNING: ONCE OR TWICE
QUESTION_4 LAST FOUR WEEKS HOW OFTEN HAVE YOU USED YOUR RESCUE INHALER OR NEBULIZER MEDICATION (SUCH AS ALBUTEROL): TWO OR THREE TIMES PER WEEK
QUESTION_5 LAST FOUR WEEKS HOW WOULD YOU RATE YOUR ASTHMA CONTROL: SOMEWHAT CONTROLLED

## 2024-07-17 NOTE — PROGRESS NOTES
Pulmonary Clinic Follow Up  July 17, 2024      Assessment:  79 year old American speaking female with asthma that is not well controlled. PFTs shows decreased FVC and FEV1 with preserved ratio suggestive of restriction.  IgE and [place] allergy panel are abnormal, Singulair started with improvement in symptoms. She had increased symptoms this past spring so we assessed her inhaler technique with the In-check device.  She was unable to achieve the suggested inspiratory flow for Symbicort so we started a trial of nebulized medications alone. She notes symptom improvement and ACT was significantly improved but she does not like how time consuming this is and prefers the inhaler.  I explained a nebulized regimen would be better for her symptom control but she still prefers the inhaler. I fear she will not be compliant if Symbicort is not prescribed so we compromised with once daily dosing of each, which she has been doing. She has been consistent with daily Symbicort, budesonide and  duonebs once daily, and flutter valve use.      Chest CT scan showed bronchiectasis with bronchial wall thickening and debris. She was hospitalized in 04/2023 with pneumonia.   ACT has improved since last visit, 16 today.     Plan:   -Continue Flonase or OTC allergy treatment/non-drowsy antihistamine  -Continue Symbicort once daily. We discussed the need to use this once daily only and to be sure she is using the nebulizer at least 1 time daily.   -Continue budesonide nebulized 1-2 daily.  Rinse and gargle after each use.   -Neither LABA nebulized formula is covered by her insurance.  -DuoNebs 1-4 times daily. Increase to QID if increased symptoms. She was encouraged to do this at least once daily to help keep airways clear.    -Flutter valve following neb treatments   - low threshold for allergy referral for biologics if symptoms flare frequently.   -UTD with flu and pneumonia vaccine    The longitudinal plan of care for the  diagnosis(es)/condition(s) as documented were addressed during this visit. Due to the added complexity in care, I will continue to support Lara in the subsequent management and with ongoing continuity of care.    If symptoms exacerbate: prednisone 40mg daily x 5 days    RTC in 6 months, sooner if needed    Freda Mina CNP  Pulmonary Medicine  [facility]   [phone]  ---------------------------------    Reason for Visit:   Chief Complaint   Patient presents with    Follow Up     Stop Symbicort   -START budesonide nebulized twice daily.  Moderate persistent asthma without complication  Elevated IgE level  Restrictive lung disease  Allergic rhinitis, unspecified seasonality, unspecified trigger          HPI:   Lara returns for follow up, she is being seen with the help of an . She was last seen in clinic in 05/2024. Since that time she has been using budesnodie and duonebs only once daily. She does not like to bring her neb machine to her son's and stays there often. She is asking for Symbicort inhaler again.   Denies current cough or wheeze. SOB has improved.   Continues Flonase, Singulair, and Pataday eye drops for allergy management.   When the weather has warmed she had increased cough, wheeze, and shortness of breath.  She is also having irritated, watery eyes. Suggesting allergy trigger to worsening symptoms previously.     Previous IgE level in 2016 was elevated at 2,572, recheck in 08/2023 was 797. Singulair was initiated following this.   Unable to identify any triggers for her asthma.         11/15/2023     8:00 AM 5/15/2024     7:52 AM 7/17/2024     8:05 AM   ACT Total Scores   ACT TOTAL SCORE (Goal Greater than or Equal to 20) 16 8 16   In the past 12 months, how many times did you visit the emergency room for your asthma without being admitted to the hospital? 0 0 0   In the past 12 months, how many times were you hospitalized overnight because of your asthma? 0 0 0     ROS:  A  "10-system review was negative aside from that mentioned in the HPI.     PMH:  Past Medical History:   Diagnosis Date    Disease of lung 8/19/2014    PFT 2014/in chart Problem list name updated by automated process. Provider to review    Edema of larynx 4/6/2014    Uncomplicated asthma      PSH:  Social History     Tobacco Use    Smoking status: Never     Passive exposure: Never    Smokeless tobacco: Never   Vaping Use    Vaping status: Never Used   Substance Use Topics    Alcohol use: No    Drug use: No     Allergies:  Allergies   Allergen Reactions    Pollen Extract      Allergic to pollen from a \"Pakistani leaf\" tree.    Seasonal Allergies Itching     Family HX:  The patient has no significant family history    Social Hx:  Social History     Tobacco Use    Smoking status: Never     Passive exposure: Never    Smokeless tobacco: Never   Vaping Use    Vaping status: Never Used   Substance Use Topics    Alcohol use: No    Drug use: No     Current Meds:  Current Outpatient Medications   Medication Sig Dispense Refill    acetaminophen (TYLENOL) 500 MG tablet TAKE 1 TABLET (500 MG) BY MOUTH EVERY 8 HOURS AS NEEDED FOR MILD PAIN 120 tablet 1    albuterol (PROAIR HFA/PROVENTIL HFA/VENTOLIN HFA) 108 (90 Base) MCG/ACT inhaler Inhale 1-2 puffs into the lungs every 6 hours as needed for shortness of breath, wheezing or cough 18 g 11    alendronate (FOSAMAX) 70 MG tablet TAKE 1 TAB EVERY 7 DAYS 60 MINUTES BEFORE MORNING MEAL WITH 8OZ WATER & REMAIN UPRIGHT FOR 30 MIN. 12 tablet 3    budesonide (PULMICORT) 0.5 MG/2ML neb solution Take 2 mLs (0.5 mg) by nebulization 2 times daily 120 mL 11    diclofenac (VOLTAREN) 1 % topical gel Apply 4 g topically 4 times daily 350 g 3    fluticasone (FLONASE) 50 MCG/ACT nasal spray SHAKE LIQUID AND USE 1 TO 2 SPRAYS IN EACH NOSTRIL DAILY 48 mL 0    ipratropium - albuterol 0.5 mg/2.5 mg/3 mL (DUONEB) 0.5-2.5 (3) MG/3ML neb solution Take 1 vial (3 mLs) by nebulization 2 times daily 180 mL 11    " meclizine (ANTIVERT) 25 MG tablet TAKE 1 TABLET BY MOUTH TWICE DAILY AS NEEDED FOR DIZZINESS 30 tablet 1    montelukast (SINGULAIR) 10 MG tablet Take 1 tablet (10 mg) by mouth at bedtime 30 tablet 11    naproxen (NAPROSYN) 375 MG tablet TAKE 1 TABLET BY MOUTH TWICE A DAY WITH MEALS 30 tablet 1    olopatadine (PATADAY) 0.2 % ophthalmic solution Place 0.05 mLs (1 drop) into both eyes daily 2.5 mL 3    omega 3 1000 MG CAPS Take 1,000 mg by mouth Once a day       No current facility-administered medications for this visit.     Physical Exam:  /78 (BP Location: Right arm, Patient Position: Sitting, Cuff Size: Adult Small)   Pulse 82   SpO2 99%     Physical Exam  Constitutional:       General: She is not in acute distress.     Appearance: She is not ill-appearing or diaphoretic.   HENT:      Nose: Nose normal.   Cardiovascular:      Rate and Rhythm: Normal rate and regular rhythm.      Pulses: Normal pulses.      Heart sounds: Normal heart sounds.   Pulmonary:      Effort: Pulmonary effort is normal. No respiratory distress.      Breath sounds: No wheezing or rhonchi.   Musculoskeletal:      Right lower leg: No edema.      Left lower leg: No edema.   Skin:     General: Skin is warm and dry.      Findings: No rash.   Neurological:      Mental Status: She is alert.   Psychiatric:         Behavior: Behavior normal.       Labs:  Recent Labs   Lab Test 12/13/18  1003   WBC 8.1   RBC 4.99   HGB 14.2   HCT 45.3   MCV 91   MCH 28.5   MCHC 31.3*   RDW 13.0        IgE 6/2016  Component Ref Range & Units 6 yr ago      Immunoglobulin E 0.00 - 100.00 kU/L 2,572.00 High        Latest Reference Range & Units 08/30/23 09:17   Allergen A alternata <0.10 KU(A)/L <0.10   Allergen A fumigatus <0.10 KU(A)/L <0.10   Allergen, [place] Grass <0.10 KU(A)/L 9.27 (H)   Allergen C herbarum <0.10 KU(A)/L <0.10   Allergen Cat Dander <0.10 KU(A)/L <0.10   Allergen Cockroach <0.10 KU(A)/L 49.10 (H)   Allergen D farinae <0.10 KU(A)/L 0.93  (H)   Allergen, D Pteronyssinus <0.10 KU(A)/L 1.19 (H)   Allergen Dog Dander <0.10 KU(A)/L <0.10   Allergen Elm <0.10 KU(A)/L 7.07 (H)   Allergen Maple <0.10 KU(A)/L 6.34 (H)   Allergen, Mtn Cedar <0.10 KU(A)/L 3.95 (H)   Allergen Oak(white) <0.10 KU(A)/L 9.80 (H)   Allergen P notatum <0.10 KU(A)/L <0.10   Allergen Caleb <0.10 KU(A)/L 11.70 (H)   Allergen Tree White [place] IgE <0.10 KU(A)/L 1.63 (H)   Allergen Weed Nettle IgE <0.10 KU(A)/L 5.80 (H)   Allergen Gordon <0.10 KU(A)/L 6.95 (H)   Allergen Marshelder <0.10 KU(A)/L 8.59 (H)   Allergen, Mouse Urine <0.10 KU(A)/L 7.38 (H)   Allergen, Ragweed Short <0.10 KU(A)/L 7.04 (H)   Allergen Russian Thistle <0.10 KU(A)/L 10.70 (H)   Allergen, Silver Birch <0.10 KU(A)/L 1.64 (H)   Allergen White Tyler <0.10 KU(A)/L 9.91 (H)   IGE 0 - 114 kU/L  0 - 114 kU/L 728 (H)  797 (H)     Imaging studies:  Personally reviewed image/s and Personally reviewed impression/s    XR CHEST 2 VIEWS, 12/13/2018 10:31 AM  INDICATION: Dyspnea.  COMPARISON: 03/01/2017 CT chest and 09/16/2016 chest radiograph.  FINDINGS: Shallow inspiration exaggerates heart size and pulmonary vascularity. Opacities in the right middle lobe, lingula have not changed significantly. There may be a trace amount of interstitial edema in the lower lungs. No pleural effusion. Bones   appear demineralized.    CT CHEST HI-RESOLUTION WO CONTRAST, DATE/TIME: 10/16/2022 11:45 AM  INDICATION:  Restrictive lung disease  COMPARISON: No prior cross-sectional imaging of the chest; frontal and lateral radiographs of the chest 12/13/2018  FINDINGS:   LUNGS AND PLEURA: There is a focal opacities within the anterior basal segment of the right lower lobe, and to a lesser extent in the right middle lobe and lingula associated with conspicuous volume loss, bronchiectasis, and subsegmental airway debris.   The anterior right lower lobe lung opacity contains a few small calcifications. In the adjacent lateral basal segment of the  right lower lobe there are additional foci of visible peripheral airway material as well as branching centrilobular   nodules/tree-in-bud opacity. Sparse peripheral airway debris noted within the left lower lobe and superior lingular segment. Air trapping is not a conspicuous feature on the expiratory images. No pleural effusion or pleural thickening.  MEDIASTINUM: Cardiac chambers are normal in size. No pericardial effusion. Mild calcification of the aortic root but no root enlargement. Preserved sinotubular junction. Normal caliber thoracic aorta. Conventional arch anatomy. There are granulomatous   calcifications present in right perihilar, subcarinal and right paratracheal lymph nodes, typical distribution for remote prior granulomatous infection. No lymphadenopathy. Decompressed esophagus. No actionable thyroid nodules.  CORONARY ARTERY CALCIFICATION: Mild.  IMPRESSION:   1.  Subsegmental airway abnormalities are present in the basal right lower lobe, lingula, and right middle lobe including bronchiectasis, bronchial wall thickening and endoluminal airway material. Focal cicatrization in the anterior basal right lower lobe and subsegmental cicatrization in the right middle lobe and lingula. 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  2.  No findings to suggest a diffuse fibrosing lung disorder or obstructive small airways process.    PFT's    2022  FEV1/FVC is 78 and is normal.  FEV1 is 1.17 L (62% predicted) and is reduced.  FVC is 1.50 L (62% predicted) and reduced.  Unfortunately DLCO and lung volumes could not be performed.  Impression: normal ratio but reduced FEV1 and FVC suggests restriction.

## 2024-07-17 NOTE — PATIENT INSTRUCTIONS
It was a pleasure seeing you in clinic today. This is what we discussed:    You can use the Symbicort 1 time daily if you are not able to use the nebulizer machine.   YOU NEED TO USE THE NEBULIZER AT LEAST 1 TIME PER DAY. You should be using both budesonide AND a Duoneb.   Use your Duonebs every 6 hours as needed for cough, shortness of breath, wheeze, or chest tightness.   Follow up 6 months   If you have worsening symptoms, questions, or need to speak with the nurse please call [phone].

## 2024-07-27 DIAGNOSIS — M79.661 PAIN OF RIGHT LOWER LEG: ICD-10-CM

## 2024-07-29 RX ORDER — PSEUDOEPHED/ACETAMINOPH/DIPHEN 30MG-500MG
TABLET ORAL
Qty: 120 TABLET | Refills: 1 | Status: SHIPPED | OUTPATIENT
Start: 2024-07-29

## 2024-07-29 NOTE — TELEPHONE ENCOUNTER
Name from pharmacy: ACETAMINOPHEN 500 MG TABLET          Will file in chart as: acetaminophen (TYLENOL) 500 MG tablet    Sig: TAKE 1 TABLET (500 MG) BY MOUTH EVERY 8 HOURS AS NEEDED FOR MILD PAIN    Disp: 120 tablet    Refills: 1    Start: 7/27/2024    Class: E-Prescribe    Non-formulary For: Pain of right lower leg    Last ordered: 6 months ago (1/3/2024) by Neftali Gibbs MD    Last refill: 2/15/2024    Rx #: [id number]    Analgesics (Non-Narcotic Tylenol and ASA Only) [id number]/27/2024 02:55 PM   Protocol Details Patient is 7 months old or older    Medication is active on med list    Medication matches indication    Recent (12 mo) or future (90 days) visit within the authorizing provider's specialty        Prescription approved per [facility] Refill Protocol.    Steve RN, BSN

## 2024-09-04 ENCOUNTER — OFFICE VISIT (OUTPATIENT)
Dept: FAMILY MEDICINE | Facility: CLINIC | Age: 79
End: 2024-09-04
Payer: COMMERCIAL

## 2024-09-04 VITALS
WEIGHT: 103.4 LBS | BODY MASS INDEX: 19.03 KG/M2 | OXYGEN SATURATION: 97 % | HEART RATE: 73 BPM | RESPIRATION RATE: 18 BRPM | SYSTOLIC BLOOD PRESSURE: 138 MMHG | HEIGHT: 62 IN | TEMPERATURE: 98 F | DIASTOLIC BLOOD PRESSURE: 83 MMHG

## 2024-09-04 DIAGNOSIS — R29.898 BILATERAL LEG WEAKNESS: Primary | ICD-10-CM

## 2024-09-04 DIAGNOSIS — R29.6 FALLS FREQUENTLY: ICD-10-CM

## 2024-09-04 DIAGNOSIS — M81.8 OTHER OSTEOPOROSIS WITHOUT CURRENT PATHOLOGICAL FRACTURE: ICD-10-CM

## 2024-09-04 LAB
BASOPHILS # BLD AUTO: 0 10E3/UL (ref 0–0.2)
BASOPHILS NFR BLD AUTO: 0 %
EOSINOPHIL # BLD AUTO: 0.1 10E3/UL (ref 0–0.7)
EOSINOPHIL NFR BLD AUTO: 2 %
ERYTHROCYTE [DISTWIDTH] IN BLOOD BY AUTOMATED COUNT: 12.9 % (ref 10–15)
HCT VFR BLD AUTO: 41.4 % (ref 35–47)
HGB BLD-MCNC: 12.8 G/DL (ref 11.7–15.7)
IMM GRANULOCYTES # BLD: 0.1 10E3/UL
IMM GRANULOCYTES NFR BLD: 1 %
LYMPHOCYTES # BLD AUTO: 2.5 10E3/UL (ref 0.8–5.3)
LYMPHOCYTES NFR BLD AUTO: 34 %
MCH RBC QN AUTO: 28.1 PG (ref 26.5–33)
MCHC RBC AUTO-ENTMCNC: 30.9 G/DL (ref 31.5–36.5)
MCV RBC AUTO: 91 FL (ref 78–100)
MONOCYTES # BLD AUTO: 0.7 10E3/UL (ref 0–1.3)
MONOCYTES NFR BLD AUTO: 10 %
NEUTROPHILS # BLD AUTO: 4 10E3/UL (ref 1.6–8.3)
NEUTROPHILS NFR BLD AUTO: 53 %
PLATELET # BLD AUTO: 249 10E3/UL (ref 150–450)
RBC # BLD AUTO: 4.56 10E6/UL (ref 3.8–5.2)
WBC # BLD AUTO: 7.4 10E3/UL (ref 4–11)

## 2024-09-04 PROCEDURE — 99213 OFFICE O/P EST LOW 20 MIN: CPT | Performed by: STUDENT IN AN ORGANIZED HEALTH CARE EDUCATION/TRAINING PROGRAM

## 2024-09-04 PROCEDURE — 85025 COMPLETE CBC W/AUTO DIFF WBC: CPT | Performed by: STUDENT IN AN ORGANIZED HEALTH CARE EDUCATION/TRAINING PROGRAM

## 2024-09-04 PROCEDURE — 36415 COLL VENOUS BLD VENIPUNCTURE: CPT | Performed by: STUDENT IN AN ORGANIZED HEALTH CARE EDUCATION/TRAINING PROGRAM

## 2024-09-04 PROCEDURE — 80053 COMPREHEN METABOLIC PANEL: CPT | Performed by: STUDENT IN AN ORGANIZED HEALTH CARE EDUCATION/TRAINING PROGRAM

## 2024-09-04 PROCEDURE — 87389 HIV-1 AG W/HIV-1&-2 AB AG IA: CPT | Performed by: STUDENT IN AN ORGANIZED HEALTH CARE EDUCATION/TRAINING PROGRAM

## 2024-09-04 PROCEDURE — 82607 VITAMIN B-12: CPT | Performed by: STUDENT IN AN ORGANIZED HEALTH CARE EDUCATION/TRAINING PROGRAM

## 2024-09-04 PROCEDURE — 84443 ASSAY THYROID STIM HORMONE: CPT | Performed by: STUDENT IN AN ORGANIZED HEALTH CARE EDUCATION/TRAINING PROGRAM

## 2024-09-04 ASSESSMENT — ASTHMA QUESTIONNAIRES
QUESTION_5 LAST FOUR WEEKS HOW WOULD YOU RATE YOUR ASTHMA CONTROL: SOMEWHAT CONTROLLED
QUESTION_4 LAST FOUR WEEKS HOW OFTEN HAVE YOU USED YOUR RESCUE INHALER OR NEBULIZER MEDICATION (SUCH AS ALBUTEROL): THREE OR MORE TIMES PER DAY
ACT_TOTALSCORE: 16
QUESTION_2 LAST FOUR WEEKS HOW OFTEN HAVE YOU HAD SHORTNESS OF BREATH: ONCE OR TWICE A WEEK
QUESTION_1 LAST FOUR WEEKS HOW MUCH OF THE TIME DID YOUR ASTHMA KEEP YOU FROM GETTING AS MUCH DONE AT WORK, SCHOOL OR AT HOME: A LITTLE OF THE TIME
ACT_TOTALSCORE: 16
QUESTION_3 LAST FOUR WEEKS HOW OFTEN DID YOUR ASTHMA SYMPTOMS (WHEEZING, COUGHING, SHORTNESS OF BREATH, CHEST TIGHTNESS OR PAIN) WAKE YOU UP AT NIGHT OR EARLIER THAN USUAL IN THE MORNING: ONCE OR TWICE

## 2024-09-04 NOTE — PROGRESS NOTES
Assessment & Plan     Bilateral leg weakness  Patient reports weakness for about a year that is getting worse. She has not had any trauma. Previous imaging was unremarkable for cause but that was 2023. Will get some blood work to look for metabolic etiologies. Based on exam, low effort. She has at least 4-/5  strength in parts of the leg. Recommended that she do some formal PT because she has been doing home exercise on her own which has not improved function. Exam is not consistent with central process or clear deficits.   - Vitamin B12; Future  - TSH with free T4 reflex; Future  - Comprehensive metabolic panel; Future  - CBC with Platelets & Differential; Future  - HIV Antigen Antibody Combo Cascade; Future    Other osteoporosis without current pathological fracture  - TSH with free T4 reflex; Future          No follow-ups on file.    Subjective   Lara is a 79 year old, presenting for the following health issues:  Joint Pain (Groin pain) and buttock pain (Have it over a year )      9/4/2024     2:30 PM   Additional Questions   Roomed by ML   Accompanied by self         9/4/2024    Information    services provided? Yes   Language Swiss   Type of interpretation provided Telephone    name [facility]    Agency [employer]        [facility]           Reason for Visit:  Injured/painful/body part: both legs, particularly the right  Date of injury/Onset: over a year  Cause: last April during hospitalization got weak and pain  Any previous surgeries: no  What symptoms do you have now, and how long have you had them: pain and numbness.   What makes it better: topical have short term relief   What makes it worse: getting up from sitting  What have you done for this problem (meds, topicals, ice/heat,injections, therapy): hot oil and massage gun  What does it keep you from doing: walking    Has been using a cane to help with mobility at home and a walker when she is out.     Patient reports  "she can't do physical therapy because she doesn't have time. She reports doing some exercises from online. She is unsure of how long she has been doing that.           Objective    /83   Pulse 73   Temp 98  F (36.7  C) (Oral)   Resp 18   Ht 1.58 m (5' 2.2\")   Wt 46.9 kg (103 lb 6.4 oz)   SpO2 97%   BMI 18.79 kg/m    Body mass index is 18.79 kg/m .  Physical Exam  Constitutional:       Comments: Frail appearing   HENT:      Head: Normocephalic.      Nose: Nose normal.      Mouth/Throat:      Mouth: Mucous membranes are moist.   Eyes:      Extraocular Movements: Extraocular movements intact.      Conjunctiva/sclera: Conjunctivae normal.   Cardiovascular:      Rate and Rhythm: Normal rate and regular rhythm.   Pulmonary:      Effort: Pulmonary effort is normal.      Breath sounds: Normal breath sounds.   Abdominal:      Palpations: Abdomen is soft.   Musculoskeletal:      Cervical back: Normal range of motion.   Skin:     General: Skin is warm.      Capillary Refill: Capillary refill takes less than 2 seconds.   Neurological:      General: No focal deficit present.     Hip exam  Examination limited to bilateral  hip    Inspection:  Gait antalgic   Hip region appeared grossly normal without significant deformity. Significant atrophy noted in hamstring and quads    TTP:  Iliopsoas  General anterior groin soft tissue/musculature: -  Greater trochanteric bursa: -  Gluteal tendons: -  Piriformis: -  IT band: -  ASIS: -  Hamstring origin: +    Range of motion:  Flexion: 100    Strength:  Flexion: 4+/5  Adduction: 4+/5  Abduction: 4-/5    Special Testing:  Logroll:  negative  Stinchfield:  negative    SI / LBP:  Lumbar paraspinal musculature not  tender  SI joint: not tender  Straight leg raise: -      Sensation throughout the hip was grossly normal  Neurovascularly intact         Documentation of Face-to-Face and Certification for Home Health Services     Patient: Lara Frost   YOB: 1945  MR " Number: [id number]  Today's Date: 9/9/2024    I certify that patient: Lara Frost is under my care and that I, or a nurse practitioner or physician's assistant working with me, had a face-to-face encounter that meets the physician face-to-face encounter requirements with this patient on: 9/4/2024.    This encounter with the patient was in whole, or in part, for the following medical condition, which is the primary reason for home health care: bilateral leg weakness, frequent falls.    I certify that, based on my findings, the following services are medically necessary home health services: Occupational Therapy and Physical Therapy.    My clinical findings support the need for the above services because: Occupational Therapy Services are needed to assess and treat cognitive ability and address ADL safety due to impairment in weakness in the lower extremities, difficulty completing tasks. and Physical Therapy Services are needed to assess and treat the following functional impairments: decreased strength and loss of sensation in bilateral lower extremities..    Further, I certify that my clinical findings support that this patient is homebound (i.e. absences from home require considerable and taxing effort and are for medical reasons or [employer] services or infrequently or of short duration when for other reasons) because: Requires assistance of another person or specialized equipment to access medical services because patient: Is prone to wander/get lost without assistance. and Is unable to operate assistive equipment on their own...    Based on the above findings. I certify that this patient is confined to the home and needs intermittent skilled nursing care, physical therapy and/or speech therapy.  The patient is under my care, and I have initiated the establishment of the plan of care.  This patient will be followed by a physician who will periodically review the plan of care.  Physician/Provider to provide  follow up care: Neftali Gibbs    Responsible Medicare certified PECOS Physician: Neftali Gibbs MD  Physician Signature: See electronic signature associated with these discharge orders.  Date: 9/9/2024              Signed Electronically by: Neftali Gibbs MD

## 2024-09-05 LAB
ALBUMIN SERPL BCG-MCNC: 4.3 G/DL (ref 3.5–5.2)
ALP SERPL-CCNC: 53 U/L (ref 40–150)
ALT SERPL W P-5'-P-CCNC: 12 U/L (ref 0–50)
ANION GAP SERPL CALCULATED.3IONS-SCNC: 11 MMOL/L (ref 7–15)
AST SERPL W P-5'-P-CCNC: 23 U/L (ref 0–45)
BILIRUB SERPL-MCNC: 0.6 MG/DL
BUN SERPL-MCNC: 17.4 MG/DL (ref 8–23)
CALCIUM SERPL-MCNC: 9.7 MG/DL (ref 8.8–10.4)
CHLORIDE SERPL-SCNC: 105 MMOL/L (ref 98–107)
CREAT SERPL-MCNC: 0.81 MG/DL (ref 0.51–0.95)
EGFRCR SERPLBLD CKD-EPI 2021: 74 ML/MIN/1.73M2
GLUCOSE SERPL-MCNC: 107 MG/DL (ref 70–99)
HCO3 SERPL-SCNC: 27 MMOL/L (ref 22–29)
HIV 1+2 AB+HIV1 P24 AG SERPL QL IA: NONREACTIVE
POTASSIUM SERPL-SCNC: 4.1 MMOL/L (ref 3.4–5.3)
PROT SERPL-MCNC: 8 G/DL (ref 6.4–8.3)
SODIUM SERPL-SCNC: 143 MMOL/L (ref 135–145)
TSH SERPL DL<=0.005 MIU/L-ACNC: 2.76 UIU/ML (ref 0.3–4.2)
VIT B12 SERPL-MCNC: 315 PG/ML (ref 232–1245)

## 2024-10-23 ENCOUNTER — OFFICE VISIT (OUTPATIENT)
Dept: FAMILY MEDICINE | Facility: CLINIC | Age: 79
End: 2024-10-23
Payer: COMMERCIAL

## 2024-10-23 VITALS
BODY MASS INDEX: 18.92 KG/M2 | HEIGHT: 62 IN | SYSTOLIC BLOOD PRESSURE: 126 MMHG | WEIGHT: 102.8 LBS | OXYGEN SATURATION: 96 % | DIASTOLIC BLOOD PRESSURE: 81 MMHG | RESPIRATION RATE: 16 BRPM | TEMPERATURE: 97.8 F | HEART RATE: 82 BPM

## 2024-10-23 DIAGNOSIS — Z29.11 NEED FOR VACCINATION AGAINST RESPIRATORY SYNCYTIAL VIRUS: ICD-10-CM

## 2024-10-23 DIAGNOSIS — M25.562 CHRONIC PAIN OF LEFT KNEE: ICD-10-CM

## 2024-10-23 DIAGNOSIS — Z79.899 MEDICATION MANAGEMENT: Primary | ICD-10-CM

## 2024-10-23 DIAGNOSIS — G62.9 POLYNEUROPATHY: ICD-10-CM

## 2024-10-23 DIAGNOSIS — G89.29 CHRONIC PAIN OF LEFT KNEE: ICD-10-CM

## 2024-10-23 DIAGNOSIS — M81.0 OSTEOPOROSIS WITHOUT PATHOLOGICAL FRACTURE: ICD-10-CM

## 2024-10-23 PROCEDURE — 80061 LIPID PANEL: CPT | Performed by: FAMILY MEDICINE

## 2024-10-23 PROCEDURE — G0439 PPPS, SUBSEQ VISIT: HCPCS | Performed by: FAMILY MEDICINE

## 2024-10-23 PROCEDURE — 36415 COLL VENOUS BLD VENIPUNCTURE: CPT | Performed by: FAMILY MEDICINE

## 2024-10-23 SDOH — HEALTH STABILITY: PHYSICAL HEALTH: ON AVERAGE, HOW MANY DAYS PER WEEK DO YOU ENGAGE IN MODERATE TO STRENUOUS EXERCISE (LIKE A BRISK WALK)?: 0 DAYS

## 2024-10-23 ASSESSMENT — ASTHMA QUESTIONNAIRES
ACT_TOTALSCORE: 16
QUESTION_1 LAST FOUR WEEKS HOW MUCH OF THE TIME DID YOUR ASTHMA KEEP YOU FROM GETTING AS MUCH DONE AT WORK, SCHOOL OR AT HOME: SOME OF THE TIME
ACT_TOTALSCORE: 16
QUESTION_5 LAST FOUR WEEKS HOW WOULD YOU RATE YOUR ASTHMA CONTROL: SOMEWHAT CONTROLLED
QUESTION_2 LAST FOUR WEEKS HOW OFTEN HAVE YOU HAD SHORTNESS OF BREATH: THREE TO SIX TIMES A WEEK
QUESTION_4 LAST FOUR WEEKS HOW OFTEN HAVE YOU USED YOUR RESCUE INHALER OR NEBULIZER MEDICATION (SUCH AS ALBUTEROL): TWO OR THREE TIMES PER WEEK
QUESTION_3 LAST FOUR WEEKS HOW OFTEN DID YOUR ASTHMA SYMPTOMS (WHEEZING, COUGHING, SHORTNESS OF BREATH, CHEST TIGHTNESS OR PAIN) WAKE YOU UP AT NIGHT OR EARLIER THAN USUAL IN THE MORNING: ONCE OR TWICE

## 2024-10-23 ASSESSMENT — SOCIAL DETERMINANTS OF HEALTH (SDOH): HOW OFTEN DO YOU GET TOGETHER WITH FRIENDS OR RELATIVES?: ONCE A WEEK

## 2024-10-23 NOTE — PROGRESS NOTES
Preventive Care Visit  [facility]  Jaskaran Parra MD, Family Medicine  Oct 23, 2024  {Provider  Link to SmartSet :873086}    {PROVIDER CHARTING PREFERENCE:216490}    Bryce Bermudez is a 79 year old, presenting for the following:  Wellness Visit        10/23/2024     3:22 PM   Additional Questions   Roomed by Mao   Accompanied by self         10/23/2024    Information    services provided? Yes   Language Honduran   Type of interpretation provided Face-to-face    name Jonathan Velez             HPI  ***  {MA/LPN/RN Pre-Provider Visit Orders- hCG/UA/Strep (Optional):139388}  {SUPERLIST (Optional):206953}  {additonal problems for provider to add (Optional):612580}  Health Care Directive  Patient does not have a Health Care Directive: {ADVANCE_DIRECTIVE_STATUS:019449}      10/23/2024   General Health   How would you rate your overall physical health? (!) FAIR            10/23/2024   Nutrition   Diet: Regular (no restrictions)            10/23/2024   Exercise   Days per week of moderate/strenous exercise 0 days            10/11/2023   Social Factors   Worry food won't last until get money to buy more Yes   Food not last or not have enough money for food? Yes   Do you have housing? (Housing is defined as stable permanent housing and does not include staying ouside in a car, in a tent, in an abandoned building, in an overnight shelter, or couch-surfing.) Yes   Are you worried about losing your housing? No   Lack of transportation? No   Unable to get utilities (heat,electricity)? No            10/23/2024   Fall Risk   Fallen 2 or more times in the past year? No   Trouble with walking or balance? Yes    Yes   Reason Gait Speed Test Not Completed Patient verbalizes unable to perform test       Multiple values from one day are sorted in reverse-chronological order          10/11/2023   Activities of Daily Living- Home Safety   Needs help with the following daily  activites Transportation    Shopping    Preparing meals    Housework    Bathing    Laundry   Safety concerns in the home None of the above       Multiple values from one day are sorted in reverse-chronological order          No data to display                  10/11/2023   Hearing Screening   Hearing concerns? No concerns               No data to display                   {Rooming Staff Patient needs a PHQ as part of the AWV.  Use this link to complete and then refresh the note to pull results Link to PHQ2 Assessment :120598}  {USE TO PULL IN PHQ RESULTS FOR TODAY:011964}      10/11/2023   Substance Use   Alcohol more than 3/day or more than 7/wk No        Social History     Tobacco Use    Smoking status: Never     Passive exposure: Never    Smokeless tobacco: Never   Vaping Use    Vaping status: Never Used   Substance Use Topics    Alcohol use: No    Drug use: No     {Provider  If there are gaps in the social history shown above, please follow the link to update and then refresh the note Link to Social and Substance History :419556}     {Mammogram Decision Support (Optional):492271}    ASCVD Risk   The ASCVD Risk score (Gerard HOWE, et al., 2019) failed to calculate for the following reasons:    Cannot find a previous HDL lab    Cannot find a previous total cholesterol lab    {Link to Fracture Risk Assessment Tool (Optional):477860}    {Provider  REQUIRED FOR AWV Use the storyboard to review patient history, after sections have been marked as reviewed, refresh note to capture documentation:305686}  {Provider   REQUIRED AWV use this link to review and update sexual activity history  after section has been marked as reviewed, refresh note to capture documentation:619165}  Reviewed and updated as needed this visit by Provider                    {HISTORY OPTIONS (Optional):497967}  Current providers sharing in care for this patient include:  Patient Care Team:  Neftali Gibbs MD as PCP - General  "(Family Medicine - Sports Medicine)  Freda Mina APRN CNP as Assigned Pulmonology Provider  Neftali Gibbs MD as Assigned PCP  No Ref-Primary, Physician    The following health maintenance items are reviewed in Epic and correct as of today:  Health Maintenance   Topic Date Due    ZOSTER IMMUNIZATION (1 of 2) Never done    ASTHMA ACTION PLAN  06/25/2020    RSV VACCINE (1 - 1-dose 75+ series) Never done    Medicare Annual MTM Pharmacist Visit (once per calendar year)  Never done    LIPID  06/25/2024    INFLUENZA VACCINE (1) 09/01/2024    COVID-19 Vaccine (6 - 2024-25 season) 09/01/2024    MEDICARE ANNUAL WELLNESS VISIT  10/11/2024    ASTHMA CONTROL TEST  03/04/2025    FALL RISK ASSESSMENT  10/23/2025    DTAP/TDAP/TD IMMUNIZATION (5 - Td or Tdap) 05/17/2027    GLUCOSE  09/04/2027    ADVANCE CARE PLANNING  10/12/2028    DEXA  05/09/2031    HEPATITIS C SCREENING  Completed    PHQ-2 (once per calendar year)  Completed    Pneumococcal Vaccine: 65+ Years  Completed    HPV IMMUNIZATION  Aged Out    MENINGITIS IMMUNIZATION  Aged Out    RSV MONOCLONAL ANTIBODY  Aged Out    MAMMO SCREENING  Discontinued    COLORECTAL CANCER SCREENING  Discontinued       {ROS Picklists (Optional):009392}     Objective    Exam  /81   Pulse 82   Temp 97.8  F (36.6  C) (Oral)   Resp 16   Ht 1.575 m (5' 2\")   Wt 46.6 kg (102 lb 12.8 oz)   SpO2 96%   BMI 18.80 kg/m     Estimated body mass index is 18.8 kg/m  as calculated from the following:    Height as of this encounter: 1.575 m (5' 2\").    Weight as of this encounter: 46.6 kg (102 lb 12.8 oz).    Physical Exam  {Exam Choices (Optional):465716}        10/23/2024   Mini Cog   Clock Draw Score 2 Normal   3 Item Recall 2 objects recalled   Mini Cog Total Score 4        {A Mini-Cog total score of 0-2 suggests the possibility of dementia, score of 3-5 suggests no dementia:454309}    Vision Screen  Patient wears corrective lenses (select all that apply): Worn during vision " screen  Vision Screen Results: Pass  {Provider  Link to Vision and Hearing Results :258281}    Signed Electronically by: Jaskaran Parra MD  {Email feedback regarding this note to [email or web address]   :368735}

## 2024-10-23 NOTE — PATIENT INSTRUCTIONS
[facility] should be able to give you a ride to physical therapy.  [phone].      Call [facility] at [phone] to arrange the physical therapy appointment.

## 2024-10-23 NOTE — LETTER
October 24, 2024      Lara Frost  [address] [place] [address]        Dear ,    We are writing to inform you of your test results.    Your cholesterol is quite high.  You would be better off taking a cholesterol lowering medicine.     Can you follow up in the clinic in the next month to discuss this?  Alternatively, we could do a phone visit to discuss this further.     Resulted Orders   Lipid panel reflex to direct LDL Non-fasting   Result Value Ref Range    Cholesterol 289 (H) <200 mg/dL    Triglycerides 205 (H) <150 mg/dL    Direct Measure HDL 63 >=50 mg/dL    LDL Cholesterol Calculated 185 (H) <100 mg/dL    Non HDL Cholesterol 226 (H) <130 mg/dL    Patient Fasting > 8hrs? Unknown     Narrative    Cholesterol  Desirable: < 200 mg/dL  Borderline High: 200 - 239 mg/dL  High: >= 240 mg/dL    Triglycerides  Normal: < 150 mg/dL  Borderline High: 150 - 199 mg/dL  High: 200-499 mg/dL  Very High: >= 500 mg/dL    Direct Measure HDL  Female: >= 50 mg/dL   Male: >= 40 mg/dL    LDL Cholesterol  Desirable: < 100 mg/dL  Above Desirable: 100 - 129 mg/dL   Borderline High: 130 - 159 mg/dL   High:  160 - 189 mg/dL   Very High: >= 190 mg/dL    Non HDL Cholesterol  Desirable: < 130 mg/dL  Above Desirable: 130 - 159 mg/dL  Borderline High: 160 - 189 mg/dL  High: 190 - 219 mg/dL  Very High: >= 220 mg/dL       If you have any questions or concerns, please call the clinic at the number listed above.       Sincerely,      Jaskaran Parra MD

## 2024-10-23 NOTE — PROGRESS NOTES
Preventive Care Visit  [facility]  Jaskaran Parra MD, Family Medicine  Oct 23, 2024      The longitudinal plan of care for the diagnosis(es)/condition(s) as documented were addressed during this visit. Due to the added complexity in care, I will continue to support her in the subsequent management and with ongoing continuity of care.  Diagnosis or treatment significantly limited by social determinants of health - low health literacy  30 minutes spent by me on the date of the encounter doing chart review, patient visit, documentation, and discussion with other provider(s)     1. Need for vaccination against respiratory syncytial virus  Recommended getting this at am outpatient pharmacy.    2. Medication management (Primary)    - Lipid panel reflex to direct LDL Non-fasting; Future  - Med Therapy Management Referral  - Lipid panel reflex to direct LDL Non-fasting    3. Chronic pain of left knee  She has osteoarthritis.  We will try PT for pain and also for gait given her unsteadiness.  - OFFICE/OUTPT VISIT,NUB[facility] ALEGRIA III    4. Polyneuropathy    - EMG; Future  - Physical Therapy  Referral; Future    5. Osteoporosis without pathological fracture  She needs a repeat DEXA scan.  - DX Bone Density; Future       SUBJECTIVE:   Lara is a 79 year old, presenting for the following:  Wellness Visit        10/23/2024     3:22 PM   Additional Questions   Roomed by Mao   Accompanied by self         10/23/2024    Information    services provided? Yes   Language Palauan   Type of interpretation provided Face-to-face    name Jonathan Velez        Are you in the first 12 months of your Medicare coverage?  No    HPI    Today's PHQ-2 Score:       10/23/2024     3:44 PM   PHQ-2 ( 1999 [employer])   Q1: Little interest or pleasure in doing things 1    Q2: Feeling down, depressed or hopeless 1    PHQ-2 Score 2    Q1: Little interest or pleasure in doing things Several days   Q2:  Feeling down, depressed or hopeless Several days   PHQ-2 Score 2       Patient-reported     Had DEXA 5/9/2016 showing osteoporosis.      Have you ever done Advance Care Planning? (For example, a Health Directive, POLST, or a discussion with a medical provider or your loved ones about your wishes): No, advance care planning information given to patient to review.  Patient plans to discuss their wishes with loved ones or provider.         Fall risk  Fallen 2 or more times in the past year?: (Patient-Rptd) No  click delete button to remove this line now  Cognitive Screening   1) Repeat 3 items (Leader, Season, Table)    2) Clock draw: NORMAL  3) 3 item recall: Recalls 2 objects   Results: NORMAL clock, 1-2 items recalled: COGNITIVE IMPAIRMENT LESS LIKELY    Mini-CogTM Copyright S Carlos. Licensed by the author for use in [facility]; reprinted with permission ([email or web address]). All rights reserved.      Do you have sleep apnea, excessive snoring or daytime drowsiness? : no    Reviewed and updated as needed this visit by clinical staff   Tobacco  Allergies               Reviewed and updated as needed this visit by Provider                  Social History     Tobacco Use    Smoking status: Never     Passive exposure: Never    Smokeless tobacco: Never   Substance Use Topics    Alcohol use: No             10/23/2024     3:43 PM   Alcohol Use   Prescreen: >3 drinks/day or >7 drinks/week? Not Applicable        Patient-reported     Do you have a current opioid prescription? No  Do you use any other controlled substances or medications that are not prescribed by a provider? None          The patient has bilateral knee pain.  She has past xrays showing osteoarthritis.  It is worse with climbing steps and does not hurt at at rest.    Current providers sharing in care for this patient include:   Patient Care Team:  Neftali Gibbs MD as PCP - General (Family Medicine - Sports Medicine)  Freda Mina APRN CNP as  "Assigned Pulmonology Provider  Neftali Gibbs MD as Assigned PCP  No Ref-Primary, Physician    The following health maintenance items are reviewed in Epic and correct as of today:  Health Maintenance   Topic Date Due    ZOSTER IMMUNIZATION (1 of 2) Never done    ASTHMA ACTION PLAN  06/25/2020    RSV VACCINE (1 - 1-dose 75+ series) Never done    Medicare Annual MTM Pharmacist Visit (once per calendar year)  Never done    LIPID  06/25/2024    INFLUENZA VACCINE (1) 09/01/2024    COVID-19 Vaccine (6 - 2024-25 season) 09/01/2024    ASTHMA CONTROL TEST  04/23/2025    MEDICARE ANNUAL WELLNESS VISIT  10/23/2025    FALL RISK ASSESSMENT  10/23/2025    DTAP/TDAP/TD IMMUNIZATION (5 - Td or Tdap) 05/17/2027    GLUCOSE  09/04/2027    ADVANCE CARE PLANNING  10/12/2028    DEXA  05/09/2031    HEPATITIS C SCREENING  Completed    PHQ-2 (once per calendar year)  Completed    Pneumococcal Vaccine: 65+ Years  Completed    HPV IMMUNIZATION  Aged Out    MENINGITIS IMMUNIZATION  Aged Out    RSV MONOCLONAL ANTIBODY  Aged Out    MAMMO SCREENING  Discontinued    COLORECTAL CANCER SCREENING  Discontinued         Pertinent mammograms are reviewed under the imaging tab.  Review of Systems     Review of Systems  Constitutional, neuro, ENT, endocrine, pulmonary, cardiac, gastrointestinal, genitourinary, musculoskeletal, integument and psychiatric systems are negative, except as otherwise noted.    OBJECTIVE:   /81   Pulse 82   Temp 97.8  F (36.6  C) (Oral)   Resp 16   Ht 1.575 m (5' 2\")   Wt 46.6 kg (102 lb 12.8 oz)   SpO2 96%   BMI 18.80 kg/m     Estimated body mass index is 18.8 kg/m  as calculated from the following:    Height as of this encounter: 1.575 m (5' 2\").    Weight as of this encounter: 46.6 kg (102 lb 12.8 oz).  Physical Exam  GENERAL: alert and no distress  RESP: lungs clear to auscultation - no rales, rhonchi or wheezes  CV: regular rate and rhythm, normal S1 S2, no S3 or S4, no murmur, click or rub, no peripheral " edema  ABDOMEN: soft, nontender, no hepatosplenomegaly, no masses and bowel sounds normal  MS: no gross musculoskeletal defects noted, no edema  Ortho: Crepitus in the knees and pain with extremes of flexion and extension.          Patient has been advised of split billing requirements and indicates understanding: Yes      Counseling  Reviewed preventive health counseling, as reflected in patient instructions       Regular exercise       Vision screening       Hearing screening       Dental care        She reports that she has never smoked. She has never been exposed to tobacco smoke. She has never used smokeless tobacco.      Appropriate preventive services were discussed with this patient, including applicable screening as appropriate for fall prevention, nutrition, physical activity, Tobacco-use cessation, weight loss and cognition.  Checklist reviewing preventive services available has been given to the patient.    Reviewed patients plan of care and provided an AVS. The Basic Care Plan (routine screening as documented in Health Maintenance) for Lara meets the Care Plan requirement. This Care Plan has been established and reviewed with the Patient and daughter.          Signed Electronically by: Jaskaran Parra MD    Identified Health Risks

## 2024-10-24 ENCOUNTER — PATIENT OUTREACH (OUTPATIENT)
Dept: CARE COORDINATION | Facility: CLINIC | Age: 79
End: 2024-10-24
Payer: COMMERCIAL

## 2024-10-24 LAB
CHOLEST SERPL-MCNC: 289 MG/DL
FASTING STATUS PATIENT QL REPORTED: ABNORMAL
HDLC SERPL-MCNC: 63 MG/DL
LDLC SERPL CALC-MCNC: 185 MG/DL
NONHDLC SERPL-MCNC: 226 MG/DL
TRIGL SERPL-MCNC: 205 MG/DL

## 2024-10-24 NOTE — RESULT ENCOUNTER NOTE
Your cholesterol is quite high.  You would be better off taking a cholesterol lowering medicine.    Can you follow up in the clinic in the next month to discuss this?  Alternatively, we could do a phone visit to discuss this further.

## 2024-11-06 NOTE — PROGRESS NOTES
Medication Therapy Management (MTM) Encounter    ASSESSMENT:                            Medication Adherence/Access: No issues identified.    Hyperlipidemia   LDL above goal <100. With age >70 and LDL not greater than 190, indicated for moderate intensity statin. Has scheduled visit with PCP to discuss starting. Briefly reviewed recent lipids and benefits of statins.       Allergy   Symptoms well controlled at this time.      Asthma   ACT not at goal >19. Assessed MDI inhaler technique. Technique poor. Does have breath capacity to use the inhaler. Not wraping lips tightly around the mouth piece, lost most of the dose. Was able to take a long, slow, deep breath without inhaler. Encouraged patient to do this with the inhaler. States she does this at home. Feels she's been using her inhalers for a long time and does not agree that technique was poor. States it was a clinic sample device so she did not want to demonstrate a second time. Encouraged patient to bring her inhaler with her to clinic for upcoming PCP visit.       Bone Health   2016 Dexa indicated  Femoral T-score less than 2.5, therefore high risk. Recommend continuing bisphosphonate for 10 years vs 5.   Calcium and Vit D within normal limits. Consider updated Vit D with next round of routine labs       Pain:   Recently referred to PT. Scheduled in 2 weeks.        PLAN:                            Continue your current medicines.   Bring your inhaler to your next visit with Dr. Parra.     Future Considerations:   Start statin   Reassess inhaler technique.   Repeat Vitamin D level     Follow-up: Return in about 12 days (around 11/20/2024) for Next Scheduled visit with PCP.    SUBJECTIVE/OBJECTIVE:                          Lara Frost is a 79 year old female seen for a yearly medication review. She was referred to me from insurance plan. Professional Syriac   by video (ID# Lemon).      Reason for visit: Medication review. Legs are feeling weak and  "tired.     Allergies/ADRs: Reviewed in chart  Past Medical History: Reviewed in chart  Tobacco: She reports that she has never smoked. She has never been exposed to tobacco smoke. She has never used smokeless tobacco.  Alcohol:  reports no history of alcohol use.     Medication Adherence/Access:     Did not bring medications to the visit.   Daughter helps with medicines at home. She arranges medicines in a pill box weekly. Shows a picture of a med list on her phone that daughter is using for set up.         Hyperlipidemia     No current medications      Recent Labs   Lab Test 10/23/24  1625 06/25/19  1017   CHOL 289* 226.5*   HDL 63 62.3   * 140*   TRIG 205* 122.8   CHOLHDLRATIO  --  3.6               Allergy   Flonase (fluticasone) nasal spray -1-2 spray(s) each nostril once daily  Montelukast 10 mg daily.     Patient reports no current medication side effects.      No concerns with symptoms right now.              Asthma   Albuterol HFA 90 mcg 1 to 2 puffs every 6 hours as needed  Budesonide neb solution 0.5 mg twice daily  Symbicort 160-4.5 mg 2 puffs twice daily  Montelukast 10 mg daily  ipratropium-albuterol 0.5-2.5 mg twice daily    Showed pictures online. Uses Symbicort twice daily. Uses Albuterol only as needed.     From July Pul notes:   \"She was unable to achieve the suggested inspiratory flow for Symbicort so we started a trial of nebulized medications alone. She notes symptom improvement and ACT was significantly improved but she does not like how time consuming this is and prefers the inhaler. \"     1-2 times per week, having chest tightness. Blue inhaler helps with this.            7/17/2024     8:05 AM 9/4/2024     2:21 PM 10/23/2024     3:47 PM   ACT Total Scores   ACT TOTAL SCORE (Goal Greater than or Equal to 20) 16 16 16    In the past 12 months, how many times did you visit the emergency room for your asthma without being admitted to the hospital? 0  0  0    In the past 12 months, how " many times were you hospitalized overnight because of your asthma? 0  0  0        Patient-reported          Bone Health     Osteoporosis:   alendronate (Fosamax) 70mg weekly (has been on current therapy since May 2016)  Patient is not experiencing side effects.     Lab Results   Component Value Date    CHECO 9.7 09/04/2024    VITDT 31 10/11/2023             Pain:   Acetaminophen 500 mg every 8 hours as needed  Naproxen 375 mg twice daily    Recently referred to PT by PCP.         Today's Vitals: /81   Pulse 78           ----------------      I spent 40 minutes with this patient today (an extra 15 minutes was spent creating the Medication Action Plan). I offer these suggestions for consideration by Dr Parra. A copy of the visit note was provided to the patient's provider(s).    A summary of these recommendations was given to the patient.    Vic Madera, PharmD  Medication Therapy Management (MTM) Pharmacist  [facility]           Medication Therapy Recommendations  Restrictive lung disease   1 Current Medication: albuterol (PROAIR HFA/PROVENTIL HFA/VENTOLIN HFA) 108 (90 Base) MCG/ACT inhaler   Current Medication Sig: Inhale 1-2 puffs into the lungs every 6 hours as needed for shortness of breath, wheezing or cough   Rationale: Does not understand instructions - Adherence - Adherence   Recommendation: Provide Education   Status: Patient Agreed - Adherence/Education   Identified Date: 11/8/2024 Completed Date: 11/8/2024

## 2024-11-08 ENCOUNTER — OFFICE VISIT (OUTPATIENT)
Dept: PHARMACY | Facility: CLINIC | Age: 79
End: 2024-11-08
Attending: FAMILY MEDICINE
Payer: COMMERCIAL

## 2024-11-08 VITALS — DIASTOLIC BLOOD PRESSURE: 81 MMHG | SYSTOLIC BLOOD PRESSURE: 128 MMHG | HEART RATE: 78 BPM

## 2024-11-08 DIAGNOSIS — M79.661 PAIN OF RIGHT LOWER LEG: ICD-10-CM

## 2024-11-08 DIAGNOSIS — J98.4 RESTRICTIVE LUNG DISEASE: ICD-10-CM

## 2024-11-08 DIAGNOSIS — M81.0 OSTEOPOROSIS WITHOUT PATHOLOGICAL FRACTURE: ICD-10-CM

## 2024-11-08 DIAGNOSIS — J31.0 CHRONIC RHINITIS: ICD-10-CM

## 2024-11-08 DIAGNOSIS — E78.5 HYPERLIPIDEMIA LDL GOAL <100: Primary | ICD-10-CM

## 2024-11-08 PROCEDURE — 99605 MTMS BY PHARM NP 15 MIN: CPT | Performed by: PHARMACIST

## 2024-11-08 PROCEDURE — 99607 MTMS BY PHARM ADDL 15 MIN: CPT | Performed by: PHARMACIST

## 2024-11-08 NOTE — Clinical Note
Met with Lara today. Reviewed inhaler technique - it was poor. She lost most of the medicine right away. Not wrapping lips around mouth piece. Pulm notes said she didn't have adequate breath capacity for the inhaler, but she was able to take a deep breath without the inhaler, just wasn't doing it with. She didn't seem to think her technique was a problem and didn't care to repeat it for me today. I asked her to bring her inhaler to her upcoming visit. Perhaps it can be reassessed then. I did talk to her about the statin. She seemed onboard to starting when she meets with you.

## 2024-11-08 NOTE — PATIENT INSTRUCTIONS
"Recommendations from today's MTM visit:                                                      MTM (medication therapy management) is a service provided by a clinical pharmacist designed to help you get the most of out of your medicines.   Today we reviewed what your medicines are for, how to know if they are working, that your medicines are safe and how to make your medicine regimen as easy as possible.      Continue your current medicines.   Bring your inhaler to your next visit with Dr. Parra.     Follow-up: Return in about 12 days (around 11/20/2024) for Next Scheduled visit with PCP.    It was great speaking with you today.  I value your experience and would be very thankful for your time in providing feedback in our clinic survey. In the next few days, you may receive an email or text message from [employer] with a link to a survey related to your  clinical pharmacist.\"     To schedule another MTM appointment, please call the clinic directly or you may call the MTM scheduling line at [phone].    My Clinical Pharmacist's contact information:                                                      Please feel free to contact me with any questions or concerns you have.      Vic Madera, PharmD  Medication Therapy Management (MTM) Pharmacist  [facility]      "

## 2024-11-08 NOTE — LETTER
"Recommended To-Do List      Prepared on: Nov 8, 2024       You can get the best results from your medications by completing the items on this \"To-Do List.\"      Bring your To-Do List when you go to your doctor. And, share it with your family or caregivers.    My To-Do List:  What we talked about: What I should do:   The importance of taking your medication as intended    Education: Wrap your lips tightly around your inhaler to prevent the medicine from escaping your mouth.            What we talked about: What I should do:                     "

## 2024-11-08 NOTE — LETTER
November 8, 2024  Lara MIKEY Margot  [address] [place] [address]    Dear Ms. Frost, [facility]     Thank you for talking with me on Nov 8, 2024 about your health and medications. As a follow-up to our conversation, I have included two documents:      Your Recommended To-Do List has steps you should take to get the best results from your medications.  Your Medication List will help you keep track of your medications and how to take them.    If you want to talk about these documents, please call Vic Madera PharmD at phone: [phone], Monday-Friday 8-4:30pm.    I look forward to working with you and your doctors to make sure your medications work well for you.    Sincerely,  Vic Madera PharmD  [facility] Pharmacist, [facility]

## 2024-11-08 NOTE — LETTER
_  Medication List        Prepared on: Nov 8, 2024     Bring your Medication List when you go to the doctor, hospital, or   emergency room. And, share it with your family or caregivers.     Note any changes to how you take your medications.  Cross out medications when you no longer use them.    Medication How I take it Why I use it Prescriber   acetaminophen (TYLENOL) 500 MG tablet TAKE 1 TABLET (500 MG) BY MOUTH EVERY 8 HOURS AS NEEDED FOR MILD PAIN Pain of right lower leg Neftali Gibbs MD   albuterol (PROAIR HFA/PROVENTIL HFA/VENTOLIN HFA) 108 (90 Base) MCG/ACT inhaler Inhale 1-2 puffs into the lungs every 6 hours as needed for shortness of breath, wheezing or cough Moderate persistent asthma without complication ANTHONY Smith CNP   alendronate (FOSAMAX) 70 MG tablet TAKE 1 TAB EVERY 7 DAYS 60 MINUTES BEFORE MORNING MEAL WITH 8OZ WATER & REMAIN UPRIGHT FOR 30 MIN. Other osteoporosis without current pathological fracture Neftali Gibbs MD   budesonide (PULMICORT) 0.5 MG/2ML neb solution Take 2 mLs (0.5 mg) by nebulization 2 times daily Moderate persistent asthma without complication ANTHONY Smith CNP   budesonide-formoterol (SYMBICORT) 160-4.5 MCG/ACT Inhaler Inhale 2 puffs into the lungs 2 times daily Moderate persistent asthma without complication ANTHONY Smith CNP   diclofenac (VOLTAREN) 1 % topical gel Apply 4 g topically 4 times daily Other osteoporosis without current pathological fracture Chet Gibbs,    fluticasone (FLONASE) 50 MCG/ACT nasal spray SHAKE LIQUID AND USE 1 TO 2 SPRAYS IN EACH NOSTRIL DAILY Seasonal allergic rhinitis, unspecified trigger Neftali Gibbs MD   ipratropium - albuterol 0.5 mg/2.5 mg/3 mL (DUONEB) 0.5-2.5 (3) MG/3ML neb solution Take 1 vial (3 mLs) by nebulization 2 times daily Moderate persistent asthma without complication ANTHONY Smith CNP   meclizine (ANTIVERT) 25 MG tablet TAKE 1 TABLET BY MOUTH TWICE DAILY AS NEEDED FOR DIZZINESS  Dizziness Carlos A Bates MD   montelukast (SINGULAIR) 10 MG tablet Take 1 tablet (10 mg) by mouth at bedtime Moderate persistent asthma without complication; Elevated IgE Level ANTHONY Smith CNP   naproxen (NAPROSYN) 375 MG tablet TAKE 1 TABLET BY MOUTH TWICE A DAY WITH MEALS Chronic pain of right hip Neftali Gibbs MD   olopatadine (PATADAY) 0.2 % ophthalmic solution Place 0.05 mLs (1 drop) into both eyes daily Allergic Conjunctivitis, Bilateral Carlos A Bates MD   omega 3 1000 MG CAPS Take 1,000 mg by mouth Once a day  General Health  Patient Reported         Add new medications, over-the-counter drugs, herbals, vitamins, or  minerals in the blank rows below.    Medication How I take it Why I use it Prescriber                                      Allergies:      - Pollen Extract  - Seasonal Allergies - Itching        Side effects I have had:      Not on File        Other Information:              My notes and questions:

## 2024-11-25 ENCOUNTER — THERAPY VISIT (OUTPATIENT)
Dept: PHYSICAL THERAPY | Facility: REHABILITATION | Age: 79
End: 2024-11-25
Attending: FAMILY MEDICINE
Payer: COMMERCIAL

## 2024-11-25 DIAGNOSIS — R26.89 IMPAIRED GAIT AND MOBILITY: ICD-10-CM

## 2024-11-25 DIAGNOSIS — G60.3 IDIOPATHIC PROGRESSIVE POLYNEUROPATHY: Primary | ICD-10-CM

## 2024-11-25 PROCEDURE — 97161 PT EVAL LOW COMPLEX 20 MIN: CPT | Mod: GP

## 2024-11-25 PROCEDURE — 97110 THERAPEUTIC EXERCISES: CPT | Mod: GP

## 2024-11-25 PROCEDURE — 97116 GAIT TRAINING THERAPY: CPT | Mod: GP

## 2024-11-25 NOTE — PROGRESS NOTES
PHYSICAL THERAPY EVALUATION  Type of Visit: Evaluation              Subjective         Presenting condition or subjective complaint: (Patient-Rptd) both knees  Date of onset: 10/23/24    Relevant medical history: (Patient-Rptd) Arthritis; Asthma; Bladder or bowel problems; Dizziness; Menopause; Osteoporosis; Pain at night or rest; Vision problems   Dates & types of surgery:      Prior diagnostic imaging/testing results:         IMPRESSION: Straightening of usual lumbar lordosis without significant spinal listhesis. No acute fracture. Scattered multilevel degenerative change most prominent L5-S1 where there is mild to moderate disc height loss and mild facet arthropathy.   Vascular calcification.    IMPRESSION:   1.  Normal bilateral hip joint spacing and alignment.  2.  No fracture.  3.  Lower lumbar facet arthrosis.   Prior therapy history for the same diagnosis, illness or injury: (Patient-Rptd) No      Prior Level of Function  Transfers: Assistive equipment, Assistive person  Ambulation: Assistive equipment, Assistive person  ADL: Assistive equipment, Assistive person  IADL:  family     Living Environment  Social support: (Patient-Rptd) With family members   Type of home: (Patient-Rptd) House; Basement   Stairs to enter the home: (Patient-Rptd) Yes (Patient-Rptd) 7 Is there a railing: (Patient-Rptd) Yes     Ramp: (Patient-Rptd) No   Stairs inside the home: (Patient-Rptd) Yes (Patient-Rptd) 12 Is there a railing: (Patient-Rptd) Yes     Help at home: (Patient-Rptd) Self Cares (home health aide/personal care attendant, family, etc); Home management tasks (cooking, cleaning); Medication and/or finances; Home and Yard maintenance tasks; Assist for driving and community activities; Emergency call system  Equipment owned: (Patient-Rptd) Straight Cane; Grab bars; Commode, FWW    Employment: (Patient-Rptd) No    Hobbies/Interests: (Patient-Rptd) music    Patient goals for therapy: (Patient-Rptd) walking    Pain  assessment: Pain present     Objective      BED MOBILITY: Independent, very slow to perform     TRANSFERS: Independent, very slow to perform     BALANCE:         KNEE EVALUATION  PAIN: Pain Level at Rest: 6/10  Pain Level with Use: 7/10  Pain Location: B knee and hip pain  Pain Quality: B hip grinding pain  Pain Frequency: constant  Pain is Worst: with activity  Pain is Exacerbated By: standing, walking  Pain is Relieved By: rest and laying flat   Pain Progression: Worsened  INTEGUMENTARY (edema, incisions):   POSTURE:  forward flexed posture and flexed trunk   GAIT:  Weightbearing Status: WBAT  Assistive Device(s): Cane (single end)  Gait Deviations: Antalgic  Slow shuffling gait, small steps, use of assistive person and SEC, generalized unsteadiness, short shuffling gait without significant foot clearance     SPECIAL TESTS  Functional Gait Assessment (FGA)      10 Meter Walk Test (Comfortable)     10 Meter Walk Test (Fast)     6 Minute Walk Test (6MWT)           Singh Balance Scale (BBS)     5 Times Sit-to-Stand (5TSTS)  10 sec for 1 rep     Dynamic Gait Index (DGI)     Timed Up and Go (TUG) - sec 1 min 40 with SEC, 1 20 with 4WW    Single Leg Stance Right (sec)    Single Leg Stance Left (sec)    Modified CTSIB Conditions (sec) Cond 1: 30 sec  Cond 2:  30 sec  Cond 4:    Cond 5 :    Romberg  (sec) 30 sec   Sharpened Romberg (sec)    30 Second Sit to Stand (reps/height)    Mini-BESTest              STRENGTH: WFL  Against gravity demonstrated 3-/5 MMT B   FLEXIBILITY:  WFL   SPECIAL TESTS:  See balance   FUNCTIONAL TESTS:  STS requires BUE, able to perform without UE though knee and hip pain  PALPATION:  B hip and knees TTP   JOINT MOBILITY:  hypomobile     Assessment & Plan   CLINICAL IMPRESSIONS  Medical Diagnosis: G62.9 (ICD-10-CM) - Polyneuropathy    Treatment Diagnosis: Balance impairment, generalized weakness, B knee pain   Impression/Assessment: Patient is a 79 year old female with balance and B knee pain  complaints.  The following significant findings have been identified: Pain, Decreased ROM/flexibility, Decreased joint mobility, Decreased strength, Impaired balance, Decreased proprioception, Impaired sensation, Impaired gait, Impaired muscle performance, Decreased activity tolerance, Impaired posture, and Instability. These impairments interfere with their ability to perform self care tasks, work tasks, recreational activities, household chores, driving , household mobility, and community mobility as compared to previous level of function. Pt presents to PT with B hip and knee pain, and increased gait unsteadiness. PMH relevant for osteoporosis. Upon eval pt demonstrating significant decreased activity tolerance likely limited due to B hip and and global LE weakness. Pt would benefit from skilled PT to improve strength to allow for more independence with function and safety with mobility     Clinical Decision Making (Complexity):  Clinical Presentation: Stable/Uncomplicated  Clinical Presentation Rationale: based on medical and personal factors listed in PT evaluation  Clinical Decision Making (Complexity): Low complexity    PLAN OF CARE  Treatment Interventions:  Modalities: E-stim, Ultrasound  Interventions: Gait Training, Manual Therapy, Neuromuscular Re-education, Therapeutic Activity, Therapeutic Exercise, Self-Care/Home Management    Long Term Goals     PT Goal 1  Goal Identifier: HEP  Goal Description: Patient will be independent in self-management of condition and HEP to reach functional goals.  Target Date: 02/17/25  PT Goal 2  Goal Identifier: FGA  Goal Description: Pt will demonstrate a 4 point improvement on the FGA to demonstrate minimum clinically significant difference in score to demonstrate improved safety with functional mobility and decrease risk of falls.  Rationale: to maximize safety and independence with performance of ADLs and functional tasks;to maximize safety and independence with self  cares  Target Date: 02/17/25  PT Goal 3  Goal Identifier: 6 MWT  Goal Description: Pt will increase distance ambulated on 6MWT with LRAD by 120 feet to demonstrate improved endurance with ambulation.  Rationale: to maximize safety and independence with performance of ADLs and functional tasks;to maximize safety and independence with self cares  Target Date: 02/17/25  PT Goal 4  Goal Identifier: 5 x STS  Goal Description: Pt will be able to perform 5xSTS in 12 seconds or less to demonstrate appropriate LE strength for community dwelling adults.  Target Date: 02/17/25      Frequency of Treatment: 1x/wk  Duration of Treatment: 12 weeks    Recommended Referrals to Other Professionals:   Education Assessment:   Learner/Method: Patient  Education Comments: Verbalizes understanding    Risks and benefits of evaluation/treatment have been explained.   Patient/Family/caregiver agrees with Plan of Care.     Evaluation Time:     PT Eval, Low Complexity Minutes (62592): 29       Signing Clinician: LIANNE PAREDES, PT        [facility]                                                                                   OUTPATIENT PHYSICAL THERAPY      PLAN OF TREATMENT FOR OUTPATIENT REHABILITATION   Patient's Last Name, First Name, M.Lara Oconnell  T YOB: 1945   Provider's Name   [facility]   Medical Record No.  [id number]     Onset Date: 10/23/24  Start of Care Date: 11/25/24     Medical Diagnosis:  G62.9 (ICD-10-CM) - Polyneuropathy      PT Treatment Diagnosis:  Balance impairment, generalized weakness, B knee pain Plan of Treatment  Frequency/Duration: 1x/wk/ 12 weeks    Certification date from 11/25/24 to 02/17/25         See note for plan of treatment details and functional goals     LIANNE PAREDES, PT                         I CERTIFY THE NEED FOR THESE SERVICES FURNISHED UNDER        THIS PLAN OF TREATMENT AND WHILE UNDER MY CARE     (Physician  attestation of this document indicates review and certification of the therapy plan).              Referring Provider:  Jaskaran Parra    Initial Assessment  See Epic Evaluation- Start of Care Date: 11/25/24

## 2024-11-26 ENCOUNTER — OFFICE VISIT (OUTPATIENT)
Dept: FAMILY MEDICINE | Facility: CLINIC | Age: 79
End: 2024-11-26
Payer: COMMERCIAL

## 2024-11-26 VITALS
WEIGHT: 107 LBS | TEMPERATURE: 98 F | HEIGHT: 62 IN | HEART RATE: 72 BPM | BODY MASS INDEX: 19.69 KG/M2 | RESPIRATION RATE: 16 BRPM | DIASTOLIC BLOOD PRESSURE: 72 MMHG | SYSTOLIC BLOOD PRESSURE: 143 MMHG | OXYGEN SATURATION: 98 %

## 2024-11-26 DIAGNOSIS — M79.651 BILATERAL THIGH PAIN: ICD-10-CM

## 2024-11-26 DIAGNOSIS — E78.2 MIXED HYPERLIPIDEMIA: Primary | ICD-10-CM

## 2024-11-26 DIAGNOSIS — M79.652 BILATERAL THIGH PAIN: ICD-10-CM

## 2024-11-26 DIAGNOSIS — M62.81 GENERALIZED MUSCLE WEAKNESS: ICD-10-CM

## 2024-11-26 RX ORDER — ROSUVASTATIN CALCIUM 20 MG/1
20 TABLET, COATED ORAL DAILY
Qty: 90 TABLET | Refills: 0 | Status: CANCELLED | OUTPATIENT
Start: 2024-11-26

## 2024-11-26 RX ORDER — ROSUVASTATIN CALCIUM 40 MG/1
40 TABLET, COATED ORAL DAILY
Qty: 90 TABLET | Refills: 0 | Status: SHIPPED | OUTPATIENT
Start: 2024-11-26

## 2024-11-26 ASSESSMENT — ASTHMA QUESTIONNAIRES
QUESTION_4 LAST FOUR WEEKS HOW OFTEN HAVE YOU USED YOUR RESCUE INHALER OR NEBULIZER MEDICATION (SUCH AS ALBUTEROL): ONCE A WEEK OR LESS
QUESTION_5 LAST FOUR WEEKS HOW WOULD YOU RATE YOUR ASTHMA CONTROL: WELL CONTROLLED
ACT_TOTALSCORE: 20
QUESTION_1 LAST FOUR WEEKS HOW MUCH OF THE TIME DID YOUR ASTHMA KEEP YOU FROM GETTING AS MUCH DONE AT WORK, SCHOOL OR AT HOME: A LITTLE OF THE TIME
QUESTION_2 LAST FOUR WEEKS HOW OFTEN HAVE YOU HAD SHORTNESS OF BREATH: ONCE OR TWICE A WEEK
ACT_TOTALSCORE: 20
QUESTION_3 LAST FOUR WEEKS HOW OFTEN DID YOUR ASTHMA SYMPTOMS (WHEEZING, COUGHING, SHORTNESS OF BREATH, CHEST TIGHTNESS OR PAIN) WAKE YOU UP AT NIGHT OR EARLIER THAN USUAL IN THE MORNING: ONCE OR TWICE

## 2024-11-26 NOTE — Clinical Note
This patient requires assistance to schedule a DEXA scan.  Please call her son whose information is listed in the chart by her request.  Thank you!

## 2024-11-26 NOTE — PROGRESS NOTES
Physician Attestation   I, Neftali Gibbs MD, saw this patient and agree with the findings and plan of care as documented in the note.      Items personally reviewed/procedural attestation: vitals.    Neftali Gibbs MD

## 2024-11-26 NOTE — PROGRESS NOTES
"  Assessment & Plan     Mixed hyperlipidemia  Recent lipid panel demonstrating LDL of 185 with recommendation per PCP to consider statin as patient has not previously been on one.  Patient amenable to this today.  Follow-up with PCP for repeat lipid check in 4 to 6 weeks.  - rosuvastatin (CRESTOR) 40 MG tablet  Dispense: 90 tablet; Refill: 0    Generalized muscle weakness  Bilateral thigh pain  Ongoing generalized weakness with today predominantly bilateral thigh pain likely related to age and underlying osteoarthritis.  Prior lab work unrevealing for metabolic etiologies.  Recently started physical therapy with request for 4 wheeled walker placed by PCP to assist in ambulation.  Previously ordered EMG scheduled.  Appreciate help from referral coordinator to schedule DEXA scan this previously been ordered.      Return in about 4 weeks (around 12/24/2024).    Subjective   Lara is a 79 year old, presenting for the following health issues:  Knee Pain (Both knee and hips. Has been a year) and OTHER (Concerns about left eye swollen )        11/26/2024     9:16 AM   Additional Questions   Roomed by Dickson CAZARES   Accompanied by Self     HPI     Presents for follow-up in knee pain, cholesterol, and DEXA scan.  Patient has had ongoing generalized weakness and today predominantly bilateral thigh pain.  She also has knee pain.  She also has buttock pain around her ischial tuberosities.  Patient has previously been seen by multiple providers in this clinic with recommendation for physical therapy and DEXA scan.  She has not yet had a DEXA scan done.  She did start physical therapy.    Reviewed message from physical therapist with request for 4 wheeled walker.        Objective    BP (!) 143/72   Pulse 72   Temp 98  F (36.7  C) (Oral)   Resp 16   Ht 1.575 m (5' 2\")   Wt 48.5 kg (107 lb)   SpO2 98%   BMI 19.57 kg/m    Body mass index is 19.57 kg/m .  Physical Exam   Constitutional: healthy, alert, no distress, and " cooperative  Head: normocephalic  Cardiovascular: appears well perfused  Respiratory: breathing comfortably on RA  Musculoskeletal: Pain to palpation along hamstrings and glutes with pain to palpation predominantly around ischial tuberosities bilaterally, gluteal muscle wasting, slow shuffling gait, neurovascularly intact  Skin: no suspicious lesions or rashes on exposed skin  Neurologic: grossly normal CN  Psychiatric: mentation appears normal and affect normal        Signed Electronically by: Svitlana Gutiérrez MD

## 2024-11-27 ENCOUNTER — TELEPHONE (OUTPATIENT)
Dept: FAMILY MEDICINE | Facility: CLINIC | Age: 79
End: 2024-11-27
Payer: COMMERCIAL

## 2024-11-27 NOTE — TELEPHONE ENCOUNTER
FYI - Status Update    Update: Call attempted, left message on both phone number to return call so we can help assist in scheduling. Imaging's number also left in case they wanted to call imaging directly to schedule.

## 2024-12-02 NOTE — TELEPHONE ENCOUNTER
FYI - Status Update    Update: Spoke with Rema and was able to connect him with Imaging scheduler. Patient scheduled for 12/04/2024 for Dexa.

## 2024-12-04 ENCOUNTER — ANCILLARY PROCEDURE (OUTPATIENT)
Dept: BONE DENSITY | Facility: CLINIC | Age: 79
End: 2024-12-04
Attending: FAMILY MEDICINE
Payer: COMMERCIAL

## 2024-12-04 DIAGNOSIS — M81.0 OSTEOPOROSIS WITHOUT PATHOLOGICAL FRACTURE: ICD-10-CM

## 2024-12-04 PROCEDURE — 77091 TBS TECHL CALCULATION ONLY: CPT | Performed by: RADIOLOGY

## 2024-12-04 PROCEDURE — 77081 DXA BONE DENSITY APPENDICULR: CPT | Mod: TC | Performed by: RADIOLOGY

## 2024-12-04 PROCEDURE — 77080 DXA BONE DENSITY AXIAL: CPT | Mod: TC | Performed by: RADIOLOGY

## 2024-12-09 DIAGNOSIS — Z78.9 INTOLERANCE OF ORAL BISPHOSPHONATE THERAPY: Primary | ICD-10-CM

## 2024-12-09 DIAGNOSIS — M81.0 AGE-RELATED OSTEOPOROSIS WITHOUT CURRENT PATHOLOGICAL FRACTURE: ICD-10-CM

## 2024-12-09 NOTE — PROGRESS NOTES
proliaThe following steps were completed to comply with the REMS program for Prolia:  Reviewed the serious risks of Prolia  and the symptoms of each risk.  Advised patient to seek prompt medical attention if they have signs or symptoms of any of the serious risks.  Patient will be provided a copy of the Medication Guide and Patient Brochure prior to first injection.    Jaskaran Parra MD

## 2025-01-06 ENCOUNTER — THERAPY VISIT (OUTPATIENT)
Dept: PHYSICAL THERAPY | Facility: REHABILITATION | Age: 80
End: 2025-01-06
Payer: COMMERCIAL

## 2025-01-06 DIAGNOSIS — G60.3 IDIOPATHIC PROGRESSIVE POLYNEUROPATHY: Primary | ICD-10-CM

## 2025-01-06 PROCEDURE — 97116 GAIT TRAINING THERAPY: CPT | Mod: GP

## 2025-01-06 PROCEDURE — 97110 THERAPEUTIC EXERCISES: CPT | Mod: GP

## 2025-01-13 ENCOUNTER — THERAPY VISIT (OUTPATIENT)
Dept: PHYSICAL THERAPY | Facility: REHABILITATION | Age: 80
End: 2025-01-13
Payer: COMMERCIAL

## 2025-01-13 DIAGNOSIS — G60.3 IDIOPATHIC PROGRESSIVE POLYNEUROPATHY: Primary | ICD-10-CM

## 2025-01-13 PROCEDURE — 97116 GAIT TRAINING THERAPY: CPT | Mod: GP

## 2025-01-13 PROCEDURE — 97110 THERAPEUTIC EXERCISES: CPT | Mod: GP

## 2025-01-20 ENCOUNTER — THERAPY VISIT (OUTPATIENT)
Dept: PHYSICAL THERAPY | Facility: REHABILITATION | Age: 80
End: 2025-01-20
Payer: COMMERCIAL

## 2025-01-20 DIAGNOSIS — G60.3 IDIOPATHIC PROGRESSIVE POLYNEUROPATHY: Primary | ICD-10-CM

## 2025-01-20 PROCEDURE — 97116 GAIT TRAINING THERAPY: CPT | Mod: GP

## 2025-01-20 PROCEDURE — 97110 THERAPEUTIC EXERCISES: CPT | Mod: GP

## 2025-01-27 ENCOUNTER — THERAPY VISIT (OUTPATIENT)
Dept: PHYSICAL THERAPY | Facility: REHABILITATION | Age: 80
End: 2025-01-27
Payer: COMMERCIAL

## 2025-01-27 DIAGNOSIS — G60.3 IDIOPATHIC PROGRESSIVE POLYNEUROPATHY: Primary | ICD-10-CM

## 2025-01-27 PROCEDURE — 97110 THERAPEUTIC EXERCISES: CPT | Mod: GP

## 2025-01-27 PROCEDURE — 97116 GAIT TRAINING THERAPY: CPT | Mod: GP

## 2025-02-04 ENCOUNTER — APPOINTMENT (OUTPATIENT)
Dept: INTERPRETER SERVICES | Facility: CLINIC | Age: 80
End: 2025-02-04
Payer: COMMERCIAL

## 2025-02-05 ENCOUNTER — OFFICE VISIT (OUTPATIENT)
Dept: NEUROLOGY | Facility: CLINIC | Age: 80
End: 2025-02-05
Attending: FAMILY MEDICINE
Payer: COMMERCIAL

## 2025-02-05 DIAGNOSIS — R26.81 GAIT INSTABILITY: Primary | ICD-10-CM

## 2025-02-05 DIAGNOSIS — M62.81 MUSCLE WEAKNESS (GENERALIZED): ICD-10-CM
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
## 2025-02-05 NOTE — RESULT ENCOUNTER NOTE
These test showed nerve damage.  Please follow-up with Dr. Gutiérrez in the clinic to discuss next steps.
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
## 2025-02-18 ENCOUNTER — APPOINTMENT (OUTPATIENT)
Dept: INTERPRETER SERVICES | Facility: CLINIC | Age: 80
End: 2025-02-18
Payer: COMMERCIAL

## 2025-03-01 DIAGNOSIS — E78.2 MIXED HYPERLIPIDEMIA: ICD-10-CM

## 2025-03-03 RX ORDER — ROSUVASTATIN CALCIUM 40 MG/1
40 TABLET, COATED ORAL DAILY
Qty: 90 TABLET | Refills: 0 | Status: SHIPPED | OUTPATIENT
Start: 2025-03-03

## 2025-03-03 NOTE — TELEPHONE ENCOUNTER
Name from pharmacy: ROSUVASTATIN CALCIUM 40 MG TAB          Will file in chart as: rosuvastatin (CRESTOR) 40 MG tablet    Sig: TAKE 1 TABLET BY MOUTH EVERY DAY    Disp: 90 tablet    Refills: 0 (Pharmacy requested: Not specified)    Start: 3/1/2025    Class: E-Prescribe    Non-formulary For: Mixed hyperlipidemia    Last ordered: 3 months ago (11/26/2024) by Neftali Gibbs MD    Last refill: 11/26/2024    Rx #: [id number]    Antihyperlipidemic agents Xyakao2903/01/2025 12:22 AM   Protocol Details LDL on file in the past 12 months    Medication is active on med list and the sig matches. RN to manually verify dose and sig if red X/fail.    Recent (12 mo) or future (90 days) visit within the authorizing provider's specialty    Patient is age 18 years or older    No active pregnancy on record    No positive pregnancy test in past 12 mos        LDL Cholesterol Calculated   Date Value Ref Range Status   10/23/2024 185 (H) <100 mg/dL Final   06/25/2019 140 (H) 0 - 129 mg/dL Final     Prescription approved per [facility] Refill Protocol.  DIO Wilson, BSN     Ears: no ear pain and no hearing problems. Nose: no nasal congestion and no nasal drainage. Mouth/Throat: no dysphagia, no hoarseness and no throat pain. Neck: no lumps, no pain, no stiffness and no swollen glands.

## 2025-03-14 PROBLEM — J47.9 BRONCHIECTASIS WITHOUT COMPLICATION (H): Status: ACTIVE | Noted: 2025-03-14

## 2025-04-16 ENCOUNTER — HOSPITAL ENCOUNTER (OUTPATIENT)
Dept: MRI IMAGING | Facility: CLINIC | Age: 80
Discharge: HOME | End: 2025-04-16
Attending: STUDENT IN AN ORGANIZED HEALTH CARE EDUCATION/TRAINING PROGRAM
Payer: COMMERCIAL

## 2025-04-16 DIAGNOSIS — M54.16 LUMBAR RADICULOPATHY: ICD-10-CM

## 2025-04-16 DIAGNOSIS — R26.9 ABNORMAL GAIT: ICD-10-CM

## 2025-04-16 PROCEDURE — A9585 GADOBUTROL INJECTION: HCPCS | Performed by: STUDENT IN AN ORGANIZED HEALTH CARE EDUCATION/TRAINING PROGRAM

## 2025-04-16 PROCEDURE — 70553 MRI BRAIN STEM W/O & W/DYE: CPT

## 2025-04-16 PROCEDURE — 255N000002 HC RX 255 OP 636: Performed by: STUDENT IN AN ORGANIZED HEALTH CARE EDUCATION/TRAINING PROGRAM

## 2025-04-16 RX ORDER — GADOBUTROL 604.72 MG/ML
5 INJECTION INTRAVENOUS ONCE
Status: COMPLETED | OUTPATIENT
Start: 2025-04-16 | End: 2025-04-16

## 2025-04-16 RX ADMIN — GADOBUTROL 5 ML: 604.72 INJECTION INTRAVENOUS at 13:26

## 2025-04-27 DIAGNOSIS — M25.551 CHRONIC PAIN OF RIGHT HIP: ICD-10-CM

## 2025-04-27 DIAGNOSIS — G89.29 CHRONIC PAIN OF RIGHT HIP: ICD-10-CM

## 2025-04-27 DIAGNOSIS — M81.8 OTHER OSTEOPOROSIS WITHOUT CURRENT PATHOLOGICAL FRACTURE: ICD-10-CM

## 2025-04-28 RX ORDER — NAPROXEN 375 MG/1
TABLET ORAL
Qty: 30 TABLET | Refills: 1 | Status: SHIPPED | OUTPATIENT
Start: 2025-04-28

## 2025-04-28 NOTE — TELEPHONE ENCOUNTER
Name from pharmacy: NAPROXEN 375 MG TABLET         Will file in chart as: naproxen (NAPROSYN) 375 MG tablet    Sig: TAKE 1 TABLET BY MOUTH TWICE A DAY WITH FOOD    Disp: 30 tablet    Refills: 1    Start: 4/27/2025    Class: E-Prescribe    Non-formulary For: Chronic pain of right hip    Last ordered: 1 year ago (3/12/2024) by Neftali Gibbs MD    Last refill: 9/11/2024    Rx #: [id number]    NSAID Medications Tachnk0704/27/2025 03:02 PM   Protocol Details Patient is age 6-64 years    Medication is active on med list and the sig matches. RN to manually verify dose and sig if red X/fail.    Always Fail Criteria - Chart Review Required          Shay Sanchez, RN, MSN

## 2025-04-28 NOTE — TELEPHONE ENCOUNTER
Name from pharmacy: DICLOFENAC SODIUM 1% GEL          Will file in chart as: diclofenac (VOLTAREN) 1 % topical gel    Sig: APPLY 4 G TOPICALLY 4 TIMES A DAY    Disp: 300 g    Refills: 3    Start: 4/27/2025    Class: E-Prescribe    Non-formulary For: Other osteoporosis without current pathological fracture    Last ordered: 1 year ago (8/2/2023) by Chet Gibbs DO    Last refill: 2/15/2024    Rx #: [id number]    Topical Steroids and Nonsteroidals Protocol Trpvwf9304/27/2025 03:06 PM   Protocol Details Medication is active on med list and the sig matches. RN to manually verify dose and sig if red X/fail.    Recent (12 mo) or future (90 days) visit within the authorizing provider's specialty          Shay Sanchez RN, MSN

## 2025-05-02 ENCOUNTER — OFFICE VISIT (OUTPATIENT)
Dept: FAMILY MEDICINE | Facility: CLINIC | Age: 80
End: 2025-05-02
Payer: COMMERCIAL

## 2025-05-02 VITALS
DIASTOLIC BLOOD PRESSURE: 90 MMHG | BODY MASS INDEX: 20.7 KG/M2 | RESPIRATION RATE: 16 BRPM | TEMPERATURE: 97.3 F | WEIGHT: 113.2 LBS | OXYGEN SATURATION: 98 % | SYSTOLIC BLOOD PRESSURE: 167 MMHG | HEART RATE: 71 BPM

## 2025-05-02 DIAGNOSIS — Z23 NEED FOR VACCINATION: ICD-10-CM

## 2025-05-02 DIAGNOSIS — J45.40 MODERATE PERSISTENT ASTHMA WITHOUT COMPLICATION: ICD-10-CM

## 2025-05-02 DIAGNOSIS — M81.0 OSTEOPOROSIS WITHOUT PATHOLOGICAL FRACTURE: ICD-10-CM

## 2025-05-02 DIAGNOSIS — R76.8 ELEVATED IGE LEVEL: ICD-10-CM

## 2025-05-02 DIAGNOSIS — E78.2 MIXED HYPERLIPIDEMIA: ICD-10-CM

## 2025-05-02 DIAGNOSIS — J45.50 SEVERE PERSISTENT ASTHMA WITHOUT COMPLICATION (H): ICD-10-CM

## 2025-05-02 DIAGNOSIS — M54.16 LUMBAR RADICULOPATHY: Primary | ICD-10-CM

## 2025-05-02 LAB
CALCIUM SERPL-MCNC: 10 MG/DL (ref 8.8–10.4)
CHOLEST SERPL-MCNC: 280 MG/DL
CREAT SERPL-MCNC: 0.79 MG/DL (ref 0.51–0.95)
EGFRCR SERPLBLD CKD-EPI 2021: 76 ML/MIN/1.73M2
FASTING STATUS PATIENT QL REPORTED: NO
HDLC SERPL-MCNC: 56 MG/DL
LDLC SERPL CALC-MCNC: 171 MG/DL
NONHDLC SERPL-MCNC: 224 MG/DL
TRIGL SERPL-MCNC: 264 MG/DL

## 2025-05-02 RX ORDER — BUDESONIDE AND FORMOTEROL FUMARATE DIHYDRATE 160; 4.5 UG/1; UG/1
2 AEROSOL RESPIRATORY (INHALATION) 2 TIMES DAILY
Qty: 10.2 G | Refills: 11 | Status: SHIPPED | OUTPATIENT
Start: 2025-05-02

## 2025-05-02 RX ORDER — GABAPENTIN 300 MG/1
300 CAPSULE ORAL DAILY
Qty: 90 CAPSULE | Refills: 0 | Status: SHIPPED | OUTPATIENT
Start: 2025-05-02

## 2025-05-02 RX ORDER — MONTELUKAST SODIUM 10 MG/1
1 TABLET ORAL AT BEDTIME
Qty: 90 TABLET | Refills: 2 | Status: SHIPPED | OUTPATIENT
Start: 2025-05-02

## 2025-05-02 RX ORDER — IPRATROPIUM BROMIDE AND ALBUTEROL SULFATE 2.5; .5 MG/3ML; MG/3ML
1 SOLUTION RESPIRATORY (INHALATION) 2 TIMES DAILY
Qty: 180 ML | Refills: 11 | Status: SHIPPED | OUTPATIENT
Start: 2025-05-02

## 2025-05-02 RX ORDER — ALBUTEROL SULFATE 90 UG/1
1-2 INHALANT RESPIRATORY (INHALATION) EVERY 6 HOURS PRN
Qty: 18 G | Refills: 11 | Status: SHIPPED | OUTPATIENT
Start: 2025-05-02

## 2025-05-02 ASSESSMENT — ASTHMA QUESTIONNAIRES: ACT_TOTALSCORE: 21

## 2025-05-02 NOTE — PATIENT INSTRUCTIONS
Schedule MRI of the back. The MRI of the brain was normal.   Schedule the injection into the back  For inhalers   Use symbicort 2 puff twice a day  Take singulair daily at bedtime  Use albuterol as needed for worsening symptoms  Get blood work to look at bone health and cholesterol  Can try take gabapentin at bedtime for leg pain. If makes you feel weird then do not have to take it. Hopefully the injection will help with symptoms  Follow up with me and pharmacist in 4 weeks

## 2025-05-02 NOTE — LETTER
My Asthma Action Plan  Name: Lara Frost   Date: 5/2/2025   My doctor: Neftali Gibbs   My clinic: [facility] [address] [place] [address] [phone] My Control and Rescue Medicine: Budesonide + formoterol (Symbicort HFA)  160/4.5 mcg    Inhale 2 puffs once daily plus 1-2 puffs as needed. May use up to 12 puffs per day.   If having symptoms then can use albuterol  My Asthma Severity: Severe Persistent    Avoid your asthma triggers: upper respiratory infections        GREEN ZONE   Good Control  I feel good  No cough or wheeze  Can work, sleep and play without asthma symptoms   Take your asthma control medicine (preventer) every day, regardless of how you feel.  If exercise triggers your asthma, take an extra puff of Symbicort  15 minutes before exercise or sports, and  During exercise if you have asthma symptoms  Spacer to use with inhaler: If you have a spacer, make sure to use it with your inhaler             YELLOW ZONE Getting Worse  I have ANY of these:  Cough or wheeze  Chest feels tight  Wake up at night  If cough is only symptom & there are also cold symptoms, use some judgement - meaning you can watch without treating unless instinct tells you asthma attack coming   Keep taking your Green Zone medications  Start taking additional puffs of Symbicort inhaler as needed. Up to 12 puffs per day total  Contact your doctor  If you do not return to the Green Zone in 12-24 hours or you get worse, you must see your doctor or go to the emergency room same day           RED ZONE Medical Alert - Get Help  I have ANY of these:  I feel awful  Medicine is not helping  Breathing getting harder  Trouble walking or talking  Blue lips  Little to no improvement from my Symbicort reliever inhalations   Call 911 or go to the emergency room right away  Take 1 inhalation of Symbicort inhaler. Wait 1-3 minutes. If there is no improvement take another inhalation of Symbicort (up to 6  inhalation on a single occasion)  If your provider has prescribed an oral steroid medicine, start taking it NOW  If only albuterol is available, take 4 puffs as often as needed until seen by a doctor    See your regular doctor within 2 weeks of an Emergency Room or Urgent Care visit for follow-up treatment.        Electronically signed by: Neftali Gibbs MD, May 2, 2025  Annual Reminders:  Flu Shot in the Fall, Pneumonia Shot                        Asthma Triggers  How To Control Things That Make Your Asthma Worse    Triggers are things that make your asthma worse.  Look at the list below to help you find your triggers and what you can do about them.  You can help prevent asthma flare-ups by staying away from your triggers.       Trigger                                                          What you can do   Cigarette Smoke  Tobacco smoke can make asthma worse. Do not allow smoking in your home, car or around you.  Be sure no one smokes at a child s day care or school.  If you smoke, ask your health care provider for ways to help you quick.  Ask family members to quit too.  Ask your health care provider for a referral to Quit plan to help you quit smoking, or call 7-817-702-PLAN.     Colds, Flu, Bronchitis  These are common triggers of asthma. Wash your hands often.  Don t touch your eyes, nose or mouth.  Get a flu shot every year.     Dust Mites  These are tiny bugs that live in cloth or carpet. They are too small to see. Wash sheets and blankets in hot water every week.   Encase pillows and mattress in dust mite proof covers.  Avoid having carpet if you can. If you have carpet, vacuum weekly.   Use a dust mask and HEPA vacuum.   Pollen and Outdoor Mold  Some people are allergic to trees, grass, or weed pollen, or molds. Try to keep your windows closed.  Limit time out doors when pollen count is high.   Ask you health care provider about taking medicine during allergy season.     Animal Dander  Some people are  allergic to skin flakes, urine or saliva from pets with fur or feathers. Keep pets with fur or feathers out of your home.    If you can t keep the pet outdoors, then keep the pet out of your bedroom.  Keep the bedroom door closed.  Keep pets off cloth furniture and away from stuffed toys.     Mice, Rats, and Cockroaches  Some people are allergic to the waste from these pets.   Cover food and garbage.  Clean up spills and food crumbs.  Store grease in the refrigerator.   Keep food out of the bedroom.   Indoor Mold  This can be a trigger if your home has high moisture Fix leaking faucets, pipes, or other sources of water.   Clean moldy surfaces.  Dehumidify basement if it is damp and smelly.   Smoke, Strong Odors, and Sprays  These can reduce air quality. Stay away from strong odors and sprays, such as perfume, powder, hair spray, paints, smoke incense, paints, cleaning products, candles and new carpet.   Exercise or Sports  Some people with asthma have this trigger. Be active!  Ask you doctor about taking medicine before sports or exercise to prevent symptoms.    Warm up for 5-10 minutes before and after sports or exercise.     Other Triggers of Asthma  Cold air:  Cover your nose and mouth with a scarf.  Sometimes laughing or crying can be a trigger.  Some medicines and food can trigger asthma.                 Your inhaler is called Symbicort, and is 160/4.5 mcg strength. It contains 2 medicines: a steroid (budesonide) to help prevent asthma attacks, and a bronchodilator (formoterol) to help relieve asthma symptoms when they happen. The bronchodilator works fast to relieve your symptoms right away, and lasts a long time to help you feel better for a longer time than albuterol. This is what it looks like. If your insurance only covers the generic inhaler, it may look different.     This inhaler leads to less asthma attacks, and better control of asthma overall.   Use 1 puff twice daily as your maintenance dose. You  can also use this inhaler as a rescue inhaler for shortness of breath or wheezing. Do not use more than 12 puffs in 1 day.    You can keep track of how many puffs are left in the inhaler by using the dose counter on the top of the inhaler. Your prescription is for 2 inhalers -- This should last you at least 30 days.

## 2025-05-10 ENCOUNTER — RESULTS FOLLOW-UP (OUTPATIENT)
Dept: FAMILY MEDICINE | Facility: CLINIC | Age: 80
End: 2025-05-10

## 2025-05-28 ENCOUNTER — OFFICE VISIT (OUTPATIENT)
Dept: FAMILY MEDICINE | Facility: CLINIC | Age: 80
End: 2025-05-28
Payer: COMMERCIAL

## 2025-05-28 VITALS
RESPIRATION RATE: 18 BRPM | WEIGHT: 113 LBS | SYSTOLIC BLOOD PRESSURE: 150 MMHG | HEART RATE: 90 BPM | TEMPERATURE: 97 F | BODY MASS INDEX: 20.8 KG/M2 | OXYGEN SATURATION: 96 % | DIASTOLIC BLOOD PRESSURE: 82 MMHG | HEIGHT: 62 IN

## 2025-05-28 DIAGNOSIS — K21.9 GASTROESOPHAGEAL REFLUX DISEASE WITHOUT ESOPHAGITIS: Primary | ICD-10-CM

## 2025-05-28 DIAGNOSIS — J45.50 SEVERE PERSISTENT ASTHMA WITHOUT COMPLICATION (H): ICD-10-CM

## 2025-05-28 DIAGNOSIS — L30.9 DERMATITIS: ICD-10-CM

## 2025-05-28 DIAGNOSIS — R35.0 URINARY FREQUENCY: ICD-10-CM

## 2025-05-28 DIAGNOSIS — L85.3 DRY SKIN DERMATITIS: ICD-10-CM

## 2025-05-28 DIAGNOSIS — H25.9 AGE-RELATED CATARACT OF BOTH EYES, UNSPECIFIED AGE-RELATED CATARACT TYPE: ICD-10-CM

## 2025-05-28 LAB
ALBUMIN UR-MCNC: ABNORMAL MG/DL
APPEARANCE UR: CLEAR
BACTERIA #/AREA URNS HPF: ABNORMAL /HPF
BILIRUB UR QL STRIP: NEGATIVE
COLOR UR AUTO: YELLOW
GLUCOSE UR STRIP-MCNC: NEGATIVE MG/DL
HGB UR QL STRIP: ABNORMAL
KETONES UR STRIP-MCNC: NEGATIVE MG/DL
LEUKOCYTE ESTERASE UR QL STRIP: NEGATIVE
NITRATE UR QL: NEGATIVE
PH UR STRIP: 5.5 [PH] (ref 5–8)
RBC #/AREA URNS AUTO: ABNORMAL /HPF
SP GR UR STRIP: >=1.03 (ref 1–1.03)
SQUAMOUS #/AREA URNS AUTO: ABNORMAL /LPF
UROBILINOGEN UR STRIP-ACNC: 0.2 E.U./DL
WBC #/AREA URNS AUTO: ABNORMAL /HPF

## 2025-05-28 RX ORDER — FAMOTIDINE 20 MG/1
20 TABLET, FILM COATED ORAL 2 TIMES DAILY
Qty: 180 TABLET | Refills: 1 | Status: SHIPPED | OUTPATIENT
Start: 2025-05-28

## 2025-05-28 RX ORDER — LANOLIN ALCOHOL/MO/W.PET/CERES
CREAM (GRAM) TOPICAL PRN
Qty: 300 G | Refills: 11 | Status: SHIPPED | OUTPATIENT
Start: 2025-05-28

## 2025-05-28 ASSESSMENT — ASTHMA QUESTIONNAIRES: ACT_TOTALSCORE: 21

## 2025-05-28 NOTE — PATIENT INSTRUCTIONS
Thank you for trusting us with your care.    1 )To get the back imaging done, please call the number below to schedule that.   Here is the contact information:  [facility] appointment line: [phone]    2) Use the cream on the hands for the itchy skin    3) Take the famotidine to help with reflux and the sensation of food catching    4) We will call you with the results of the urine test    5) Schedule appointment with eye doctor for cataract surgery

## 2025-05-28 NOTE — LETTER
My Asthma Action Plan  Name: Lara Frost   Date: 5/28/2025   My doctor: Neftali Gibbs   My clinic: [facility] [address] [place] [address] [phone] My Control and Rescue Medicine: Budesonide + formoterol (Symbicort HFA)  160/4.5 mcg    Inhale 2 puffs once daily plus 1-2 puffs as needed. May use up to 12 puffs per day.    My Asthma Severity: Severe Persistent    Avoid your asthma triggers:         GREEN ZONE   Good Control  I feel good  No cough or wheeze  Can work, sleep and play without asthma symptoms   Take your asthma control medicine (preventer) every day, regardless of how you feel.  If exercise triggers your asthma, take an extra puff of Symbicort  15 minutes before exercise or sports, and  During exercise if you have asthma symptoms  Spacer to use with inhaler: If you have a spacer, make sure to use it with your inhaler             YELLOW ZONE Getting Worse  I have ANY of these:  Cough or wheeze  Chest feels tight  Wake up at night  If cough is only symptom & there are also cold symptoms, use some judgement - meaning you can watch without treating unless instinct tells you asthma attack coming   Keep taking your Green Zone medications  Start taking additional puffs of Symbicort inhaler as needed. Up to 12 puffs per day total  Contact your doctor  If you do not return to the Green Zone in 12-24 hours or you get worse, you must see your doctor or go to the emergency room same day           RED ZONE Medical Alert - Get Help  I have ANY of these:  I feel awful  Medicine is not helping  Breathing getting harder  Trouble walking or talking  Blue lips  Little to no improvement from my Symbicort reliever inhalations   Call 911 or go to the emergency room right away  Take 1 inhalation of Symbicort inhaler. Wait 1-3 minutes. If there is no improvement take another inhalation of Symbicort (up to 6 inhalation on a single occasion)  If your provider has prescribed an oral  steroid medicine, start taking it NOW  If only albuterol is available, take 4 puffs as often as needed until seen by a doctor    See your regular doctor within 2 weeks of an Emergency Room or Urgent Care visit for follow-up treatment.        Electronically signed by: Neftali Gibbs MD, May 28, 2025  Annual Reminders:  Flu Shot in the Fall, Pneumonia Shot                        Asthma Triggers  How To Control Things That Make Your Asthma Worse    Triggers are things that make your asthma worse.  Look at the list below to help you find your triggers and what you can do about them.  You can help prevent asthma flare-ups by staying away from your triggers.       Trigger                                                          What you can do   Cigarette Smoke  Tobacco smoke can make asthma worse. Do not allow smoking in your home, car or around you.  Be sure no one smokes at a child s day care or school.  If you smoke, ask your health care provider for ways to help you quick.  Ask family members to quit too.  Ask your health care provider for a referral to Quit plan to help you quit smoking, or call 5-761-282-PLAN.     Colds, Flu, Bronchitis  These are common triggers of asthma. Wash your hands often.  Don t touch your eyes, nose or mouth.  Get a flu shot every year.     Dust Mites  These are tiny bugs that live in cloth or carpet. They are too small to see. Wash sheets and blankets in hot water every week.   Encase pillows and mattress in dust mite proof covers.  Avoid having carpet if you can. If you have carpet, vacuum weekly.   Use a dust mask and HEPA vacuum.   Pollen and Outdoor Mold  Some people are allergic to trees, grass, or weed pollen, or molds. Try to keep your windows closed.  Limit time out doors when pollen count is high.   Ask you health care provider about taking medicine during allergy season.     Animal Dander  Some people are allergic to skin flakes, urine or saliva from pets with fur or feathers.  Keep pets with fur or feathers out of your home.    If you can t keep the pet outdoors, then keep the pet out of your bedroom.  Keep the bedroom door closed.  Keep pets off cloth furniture and away from stuffed toys.     Mice, Rats, and Cockroaches  Some people are allergic to the waste from these pets.   Cover food and garbage.  Clean up spills and food crumbs.  Store grease in the refrigerator.   Keep food out of the bedroom.   Indoor Mold  This can be a trigger if your home has high moisture Fix leaking faucets, pipes, or other sources of water.   Clean moldy surfaces.  Dehumidify basement if it is damp and smelly.   Smoke, Strong Odors, and Sprays  These can reduce air quality. Stay away from strong odors and sprays, such as perfume, powder, hair spray, paints, smoke incense, paints, cleaning products, candles and new carpet.   Exercise or Sports  Some people with asthma have this trigger. Be active!  Ask you doctor about taking medicine before sports or exercise to prevent symptoms.    Warm up for 5-10 minutes before and after sports or exercise.     Other Triggers of Asthma  Cold air:  Cover your nose and mouth with a scarf.  Sometimes laughing or crying can be a trigger.  Some medicines and food can trigger asthma.                 Your inhaler is called Symbicort, and is 160/4.5 mcg strength. It contains 2 medicines: a steroid (budesonide) to help prevent asthma attacks, and a bronchodilator (formoterol) to help relieve asthma symptoms when they happen. The bronchodilator works fast to relieve your symptoms right away, and lasts a long time to help you feel better for a longer time than albuterol. This is what it looks like. If your insurance only covers the generic inhaler, it may look different.     This inhaler leads to less asthma attacks, and better control of asthma overall.   Use 2 puff 2 daily as your maintenance dose. You can also use this inhaler as a rescue inhaler for shortness of breath or  wheezing. Do not use more than 12 puffs in 1 day.    You can keep track of how many puffs are left in the inhaler by using the dose counter on the top of the inhaler. Your prescription is for 2 inhalers -- This should last you at least 30 days.

## 2025-05-28 NOTE — PROGRESS NOTES
Assessment & Plan     Gastroesophageal reflux disease without esophagitis  Symptoms are consistent with reflux will trial some Pepcid to see if that improves.  If not we will need further workup  - famotidine (PEPCID) 20 MG tablet; Take 1 tablet (20 mg) by mouth 2 times daily.    Severe persistent asthma without complication (H)  Asthma is stable no concerns.  She is using inhalers without any difficulty.    Urinary frequency  Patient reports that she has been having to pee more frequently at night.  There is no pain or changes in the urine   - Macroscopic with reflex to Microscopic; Future  - Urine Macroscopic with reflex to Microscopic  - Urine Microscopic Exam    Dry skin dermatitis  - mineral oil-white petrolatum (EUCERIN/MINERIN) cream; Apply topically as needed for dry skin.    Age-related cataract of both eyes, unspecified age-related cataract type  - Adult Eye  Referral; Future      Number provided so patient's son can call and schedule the CHAR[facility].          Subjective   Lara is a 80 year old, presenting for the following health issues:  Follow Up (Last visit ) and Lab Result Notice        5/28/2025     1:56 PM   Additional Questions   Roomed by ML   Accompanied by self         5/28/2025    Information    services provided? Yes   Language Lao   Type of interpretation provided Face-to-face    name Mike zhang    ID [id number]    [employer]    phone number [phone]     HPI        Asthma  Patient has not had an ACT done in this encounter: Link to ACT Flowsheet       5/28/2025     2:12 PM   ACT Total Scores   ACT TOTAL SCORE (Goal Greater than or Equal to 20) 21   In the past 12 months, how many times did you visit the emergency room for your asthma without being admitted to the hospital? 0   In the past 12 months, how many times were you hospitalized overnight because of your asthma? 0     Do you have any of the following  "symptoms? None of these symptoms (cough/noisy breathing/trouble with breathing)  What makes your asthma/breathing worse?  Upper respiratory illness    Genitourinary - Female  Onset/Duration: chronic  Description:   Painful urination (Dysuria): No           Frequency: YES  Blood in urine (Hematuria): No  Delay in urine (Hesitency): No  Intensity: moderate  Progression of Symptoms:  worsening  Accompanying Signs & Symptoms:  Fever/chills: No  Flank pain: No  Nausea and vomiting: No  Vaginal symptoms: none  Abdominal/Pelvic Pain: No  History:   History of frequent UTI s: No  History of kidney stones: No  Sexually Active: No  Possibility of pregnancy: No  Precipitating or alleviating factors: None  Therapies tried and outcome:  none           Objective    BP (!) 150/82   Pulse 90   Temp 97  F (36.1  C) (Tympanic)   Resp 18   Ht 1.575 m (5' 2\")   Wt 51.3 kg (113 lb)   SpO2 96%   BMI 20.67 kg/m    Body mass index is 20.67 kg/m .  Physical Exam  HENT:      Head: Normocephalic.      Nose: Nose normal.      Mouth/Throat:      Mouth: Mucous membranes are moist.   Eyes:      Extraocular Movements: Extraocular movements intact.      Conjunctiva/sclera: Conjunctivae normal.   Cardiovascular:      Rate and Rhythm: Normal rate and regular rhythm.   Pulmonary:      Effort: Pulmonary effort is normal.      Breath sounds: Normal breath sounds.   Abdominal:      Palpations: Abdomen is soft.   Musculoskeletal:      Cervical back: Normal range of motion.   Skin:     General: Skin is warm.      Capillary Refill: Capillary refill takes less than 2 seconds.   Neurological:      General: No focal deficit present.      Mental Status: She is alert.                    Signed Electronically by: Neftali Gibbs MD    "

## 2025-05-29 ENCOUNTER — PATIENT OUTREACH (OUTPATIENT)
Dept: CARE COORDINATION | Facility: CLINIC | Age: 80
End: 2025-05-29
Payer: COMMERCIAL

## 2025-06-02 ENCOUNTER — PATIENT OUTREACH (OUTPATIENT)
Dept: CARE COORDINATION | Facility: CLINIC | Age: 80
End: 2025-06-02
Payer: COMMERCIAL

## 2025-06-08 DIAGNOSIS — E78.2 MIXED HYPERLIPIDEMIA: ICD-10-CM

## 2025-06-09 RX ORDER — ROSUVASTATIN CALCIUM 40 MG/1
40 TABLET, COATED ORAL DAILY
Qty: 90 TABLET | Refills: 0 | Status: SHIPPED | OUTPATIENT
Start: 2025-06-09

## 2025-06-09 NOTE — TELEPHONE ENCOUNTER
Name from pharmacy: ROSUVASTATIN CALCIUM 40 MG TAB          Will file in chart as: rosuvastatin (CRESTOR) 40 MG tablet    Sig: TAKE 1 TABLET BY MOUTH EVERY DAY    Disp: 90 tablet    Refills: 0 (Pharmacy requested: Not specified)    Start: 6/8/2025    Class: E-Prescribe    Non-formulary For: Mixed hyperlipidemia    Last ordered: 3 months ago (3/3/2025) by Neftali Gibbs MD    Last refill: 3/3/2025    Rx #: [id number]    Antihyperlipidemic agents Mhgjtk1206/08/2025 12:22 AM   Protocol Details LDL on file in the past 12 months    Medication is active on med list and the sig matches. RN to manually verify dose and sig if red X/fail.    Recent (12 month) or future (90 days) visit with authorizing provider's specialty (provided they have been seen in the past 15 months)    Patient is age 18 years or older    No active pregnancy on record    No positive pregnancy test in past 12 mos        LDL Cholesterol Calculated   Date Value Ref Range Status   05/02/2025 171 (H) <100 mg/dL Final   06/25/2019 140 (H) 0 - 129 mg/dL Final     Prescription approved per [facility] Refill Protocol.  DIO Wilson, BSN

## 2025-07-22 DIAGNOSIS — M81.8 OTHER OSTEOPOROSIS WITHOUT CURRENT PATHOLOGICAL FRACTURE: ICD-10-CM

## 2025-07-22 RX ORDER — ALENDRONATE SODIUM 70 MG/1
TABLET ORAL
Qty: 12 TABLET | Refills: 3 | Status: SHIPPED | OUTPATIENT
Start: 2025-07-22

## 2025-07-22 NOTE — TELEPHONE ENCOUNTER
Name from pharmacy: ALENDRONATE SODIUM 70 MG TAB          Will file in chart as: alendronate (FOSAMAX) 70 MG tablet    Sig: TAKE 1 TAB EVERY 7 DAYS 60 MINUTES BEFORE MORNING MEAL WITH 8OZ WATER & REMAIN UPRIGHT FOR 30 MIN.    Disp: 12 tablet    Refills: 3    Start: 7/22/2025    Class: E-Prescribe    Non-formulary For: Other osteoporosis without current pathological fracture    Last ordered: 1 year ago (5/7/2024) by Neftali Gibbs MD    Last refill: 4/27/2025    Rx #: [id number]    Bisphosphonates Yleeop8807/22/2025 12:31 AM   Protocol Details Dexa scan completed in the past 48-months    Medication is active on med list and the sig matches. RN to manually verify dose and sig if red X/fail.    Medication indicated for associated diagnosis    Recent (12 month) or future (90 days) visit with authorizing provider's specialty (provided they have been seen in the past 15 months)    Most recent Creatinine Clearance in last 12 months >35    Patient is age 18 or older          Prescription approved per [facility] Refill Protocol.    Shay Sanchez, RN, MSN

## (undated) RX ORDER — SIMETHICONE 40MG/0.6ML
SUSPENSION, DROPS(FINAL DOSAGE FORM)(ML) ORAL
Status: DISPENSED
Start: 2022-09-01

## (undated) RX ORDER — FENTANYL CITRATE 50 UG/ML
INJECTION, SOLUTION INTRAMUSCULAR; INTRAVENOUS
Status: DISPENSED
Start: 2022-09-01